# Patient Record
Sex: FEMALE | Race: WHITE | NOT HISPANIC OR LATINO | Employment: UNEMPLOYED | URBAN - NONMETROPOLITAN AREA
[De-identification: names, ages, dates, MRNs, and addresses within clinical notes are randomized per-mention and may not be internally consistent; named-entity substitution may affect disease eponyms.]

---

## 2023-04-03 ENCOUNTER — TELEPHONE (OUTPATIENT)
Dept: PRIMARY CARE | Facility: CLINIC | Age: 73
End: 2023-04-03

## 2023-04-03 DIAGNOSIS — I26.93 SINGLE SUBSEGMENTAL PULMONARY EMBOLISM WITHOUT ACUTE COR PULMONALE (MULTI): Primary | ICD-10-CM

## 2023-04-03 DIAGNOSIS — I26.93 SINGLE SUBSEGMENTAL PULMONARY EMBOLISM WITHOUT ACUTE COR PULMONALE (MULTI): ICD-10-CM

## 2023-04-03 PROBLEM — R93.1 AGATSTON CORONARY ARTERY CALCIUM SCORE GREATER THAN 400: Status: ACTIVE | Noted: 2023-04-03

## 2023-04-03 RX ORDER — ROSUVASTATIN CALCIUM 40 MG/1
40 TABLET, COATED ORAL DAILY
COMMUNITY
End: 2023-05-09 | Stop reason: SDUPTHER

## 2023-04-03 RX ORDER — METOPROLOL TARTRATE 50 MG/1
50 TABLET ORAL 2 TIMES DAILY
COMMUNITY
End: 2023-04-25

## 2023-04-03 NOTE — TELEPHONE ENCOUNTER
called, states they got the 30 day free card activated, please send Eliquis to [facility].

## 2023-04-25 DIAGNOSIS — R93.1 AGATSTON CORONARY ARTERY CALCIUM SCORE GREATER THAN 400: Primary | ICD-10-CM

## 2023-04-25 RX ORDER — METOPROLOL TARTRATE 50 MG/1
TABLET ORAL
Qty: 90 TABLET | Refills: 0 | Status: SHIPPED | OUTPATIENT
Start: 2023-04-25 | End: 2023-04-28

## 2023-04-27 DIAGNOSIS — R93.1 AGATSTON CORONARY ARTERY CALCIUM SCORE GREATER THAN 400: ICD-10-CM

## 2023-04-28 PROBLEM — J11.1 INFLUENZA: Status: RESOLVED | Noted: 2023-04-28 | Resolved: 2023-04-28

## 2023-04-28 PROBLEM — H81.10 BENIGN PAROXYSMAL POSITIONAL VERTIGO: Status: ACTIVE | Noted: 2023-04-28

## 2023-04-28 PROBLEM — E66.812 CLASS 2 SEVERE OBESITY WITH BODY MASS INDEX (BMI) OF 35 TO 39.9 WITH SERIOUS COMORBIDITY: Status: ACTIVE | Noted: 2023-04-28

## 2023-04-28 PROBLEM — E55.9 VITAMIN D DEFICIENCY: Status: ACTIVE | Noted: 2023-04-28

## 2023-04-28 PROBLEM — I26.99 PULMONARY EMBOLISM (MULTI): Status: RESOLVED | Noted: 2023-04-28 | Resolved: 2023-04-28

## 2023-04-28 PROBLEM — E11.9 DIABETES MELLITUS, TYPE 2 (MULTI): Status: ACTIVE | Noted: 2023-04-28

## 2023-04-28 PROBLEM — I07.9 TRICUSPID VALVE DISEASE: Status: ACTIVE | Noted: 2023-04-28

## 2023-04-28 PROBLEM — I10 HYPERTENSION: Status: ACTIVE | Noted: 2023-04-28

## 2023-04-28 PROBLEM — E66.01 CLASS 2 SEVERE OBESITY WITH BODY MASS INDEX (BMI) OF 35 TO 39.9 WITH SERIOUS COMORBIDITY (MULTI): Status: ACTIVE | Noted: 2023-04-28

## 2023-04-28 RX ORDER — METOPROLOL TARTRATE 50 MG/1
TABLET ORAL
Qty: 90 TABLET | Refills: 0 | Status: SHIPPED | OUTPATIENT
Start: 2023-04-28 | End: 2023-05-09 | Stop reason: SDUPTHER

## 2023-05-09 ENCOUNTER — OFFICE VISIT (OUTPATIENT)
Dept: PRIMARY CARE | Facility: CLINIC | Age: 73
End: 2023-05-09
Payer: COMMERCIAL

## 2023-05-09 VITALS
WEIGHT: 184 LBS | BODY MASS INDEX: 33.86 KG/M2 | HEART RATE: 50 BPM | DIASTOLIC BLOOD PRESSURE: 82 MMHG | HEIGHT: 62 IN | SYSTOLIC BLOOD PRESSURE: 126 MMHG | OXYGEN SATURATION: 97 %

## 2023-05-09 DIAGNOSIS — E11.9 TYPE 2 DIABETES MELLITUS WITHOUT COMPLICATION, WITHOUT LONG-TERM CURRENT USE OF INSULIN (MULTI): Primary | ICD-10-CM

## 2023-05-09 DIAGNOSIS — R93.1 AGATSTON CORONARY ARTERY CALCIUM SCORE GREATER THAN 400: ICD-10-CM

## 2023-05-09 DIAGNOSIS — E55.9 VITAMIN D DEFICIENCY: ICD-10-CM

## 2023-05-09 DIAGNOSIS — I10 PRIMARY HYPERTENSION: ICD-10-CM

## 2023-05-09 LAB — POC HEMOGLOBIN A1C: 6.8 % (ref 4.2–6.5)

## 2023-05-09 PROCEDURE — 99213 OFFICE O/P EST LOW 20 MIN: CPT | Performed by: FAMILY MEDICINE

## 2023-05-09 PROCEDURE — 3079F DIAST BP 80-89 MM HG: CPT | Performed by: FAMILY MEDICINE

## 2023-05-09 PROCEDURE — 1159F MED LIST DOCD IN RCRD: CPT | Performed by: FAMILY MEDICINE

## 2023-05-09 PROCEDURE — 83036 HEMOGLOBIN GLYCOSYLATED A1C: CPT | Performed by: FAMILY MEDICINE

## 2023-05-09 PROCEDURE — 3074F SYST BP LT 130 MM HG: CPT | Performed by: FAMILY MEDICINE

## 2023-05-09 PROCEDURE — 1160F RVW MEDS BY RX/DR IN RCRD: CPT | Performed by: FAMILY MEDICINE

## 2023-05-09 PROCEDURE — 3044F HG A1C LEVEL LT 7.0%: CPT | Performed by: FAMILY MEDICINE

## 2023-05-09 PROCEDURE — 1036F TOBACCO NON-USER: CPT | Performed by: FAMILY MEDICINE

## 2023-05-09 RX ORDER — ACETAMINOPHEN 500 MG
50 TABLET ORAL DAILY
Qty: 90 CAPSULE | Refills: 3
Start: 2023-05-09 | End: 2024-05-08

## 2023-05-09 RX ORDER — ASPIRIN 81 MG/1
1 TABLET ORAL DAILY
COMMUNITY
Start: 2020-01-17

## 2023-05-09 RX ORDER — ROSUVASTATIN CALCIUM 40 MG/1
40 TABLET, COATED ORAL DAILY
Qty: 90 TABLET | Refills: 3 | Status: SHIPPED | OUTPATIENT
Start: 2023-05-09 | End: 2024-05-08

## 2023-05-09 RX ORDER — METOPROLOL TARTRATE 50 MG/1
50 TABLET ORAL 2 TIMES DAILY
Qty: 90 TABLET | Refills: 3 | Status: SHIPPED | OUTPATIENT
Start: 2023-05-09 | End: 2024-01-15

## 2023-05-09 RX ORDER — METFORMIN HYDROCHLORIDE 500 MG/1
1 TABLET ORAL DAILY
COMMUNITY
Start: 2023-01-11 | End: 2023-05-09 | Stop reason: WASHOUT

## 2023-05-09 RX ORDER — LISINOPRIL AND HYDROCHLOROTHIAZIDE 10; 12.5 MG/1; MG/1
1 TABLET ORAL DAILY
Qty: 90 TABLET | Refills: 3 | Status: SHIPPED | OUTPATIENT
Start: 2023-05-09 | End: 2024-05-08

## 2023-05-09 RX ORDER — LISINOPRIL AND HYDROCHLOROTHIAZIDE 10; 12.5 MG/1; MG/1
1 TABLET ORAL DAILY
COMMUNITY
Start: 2023-03-10 | End: 2023-05-09 | Stop reason: SDUPTHER

## 2023-05-09 ASSESSMENT — PATIENT HEALTH QUESTIONNAIRE - PHQ9
SUM OF ALL RESPONSES TO PHQ9 QUESTIONS 1 AND 2: 0
1. LITTLE INTEREST OR PLEASURE IN DOING THINGS: NOT AT ALL
2. FEELING DOWN, DEPRESSED OR HOPELESS: NOT AT ALL

## 2023-05-09 NOTE — PROGRESS NOTES
"Subjective   Patient ID: Cassidy Osorio is a 72 y.o. female who presents for Diabetes and Hypertension.    HPI for 6 month check up  DM 2 tricuspid Dx   Saw cardiology Feb Prashanth   Denies any significant change in level of her dyspnea on exertion  1 flight of steps will cause  No chest pain pressure palpitations  She did Have echocardiogram at cardiology visit    Review of Systems    Objective   /82   Pulse 50   Ht 1.575 m (5' 2\")   Wt 83.5 kg (184 lb)   SpO2 97%   BMI 33.65 kg/m²     Physical Exam  General: alert, no apparent distress, good hygiene   HEAD:  Normocephalic, atraumatic    EARS:  EAC patent, TMs normal,   EYES:  sclera white, ALFONSO, conjunctiva noninjected  NOSE: Nasal passages patent   MOUTH: Pharynx clear, tongue uvula midline, grade 2 airway  Neck:  supple, no masses, thyroid non enlarged non nodular, no cervical adenopathy,  Lungs:  no wheezing, no rales , no rhonchi, normal respiratory pattern, breath sounds not diminished  Heart:  regular rate and  rhythm, 2/6 RUSB  murmur, no ectopy, no S3 or S4, no carotid bruits  Abdomen:  soft NT,BS + ,  no organomegaly, no masses, no bruits  Extremities:  no edema, no cyanosis, no clubbing,  2+ posterior tibialis pulse  ,both feet ext rotated R>L   Psych:  speech fluent, normal affect, normal thought process  Skin:  no rashes, no concerning skin lesions, normal texture      Assessment/Plan   Problem List Items Addressed This Visit       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  Follow-up here or with cardiology if develops more significant dyspnea on exertion chest pain palpitations.  Diabetes appears to be under adequate control with just diet

## 2023-10-16 ENCOUNTER — OFFICE VISIT (OUTPATIENT)
Dept: PRIMARY CARE | Facility: CLINIC | Age: 73
End: 2023-10-16
Payer: COMMERCIAL

## 2023-10-16 VITALS
BODY MASS INDEX: 35.15 KG/M2 | OXYGEN SATURATION: 98 % | DIASTOLIC BLOOD PRESSURE: 80 MMHG | HEART RATE: 67 BPM | SYSTOLIC BLOOD PRESSURE: 132 MMHG | WEIGHT: 191 LBS | HEIGHT: 62 IN

## 2023-10-16 DIAGNOSIS — H81.10 BENIGN PAROXYSMAL POSITIONAL VERTIGO, UNSPECIFIED LATERALITY: ICD-10-CM

## 2023-10-16 DIAGNOSIS — I10 PRIMARY HYPERTENSION: Primary | ICD-10-CM

## 2023-10-16 DIAGNOSIS — E11.9 TYPE 2 DIABETES MELLITUS WITHOUT COMPLICATION, WITHOUT LONG-TERM CURRENT USE OF INSULIN (MULTI): ICD-10-CM

## 2023-10-16 DIAGNOSIS — I26.99 PULMONARY EMBOLISM WITHOUT ACUTE COR PULMONALE, UNSPECIFIED CHRONICITY, UNSPECIFIED PULMONARY EMBOLISM TYPE (MULTI): ICD-10-CM

## 2023-10-16 DIAGNOSIS — R93.1 AGATSTON CORONARY ARTERY CALCIUM SCORE GREATER THAN 400: ICD-10-CM

## 2023-10-16 PROBLEM — E66.01 CLASS 2 SEVERE OBESITY WITH BODY MASS INDEX (BMI) OF 35 TO 39.9 WITH SERIOUS COMORBIDITY (MULTI): Status: RESOLVED | Noted: 2023-04-28 | Resolved: 2023-10-16

## 2023-10-16 PROBLEM — E66.812 CLASS 2 SEVERE OBESITY WITH BODY MASS INDEX (BMI) OF 35 TO 39.9 WITH SERIOUS COMORBIDITY: Status: RESOLVED | Noted: 2023-04-28 | Resolved: 2023-10-16

## 2023-10-16 PROCEDURE — 99213 OFFICE O/P EST LOW 20 MIN: CPT | Performed by: FAMILY MEDICINE

## 2023-10-16 PROCEDURE — 3044F HG A1C LEVEL LT 7.0%: CPT | Performed by: FAMILY MEDICINE

## 2023-10-16 PROCEDURE — 1159F MED LIST DOCD IN RCRD: CPT | Performed by: FAMILY MEDICINE

## 2023-10-16 PROCEDURE — 3075F SYST BP GE 130 - 139MM HG: CPT | Performed by: FAMILY MEDICINE

## 2023-10-16 PROCEDURE — 3079F DIAST BP 80-89 MM HG: CPT | Performed by: FAMILY MEDICINE

## 2023-10-16 PROCEDURE — 1036F TOBACCO NON-USER: CPT | Performed by: FAMILY MEDICINE

## 2023-10-16 PROCEDURE — 1160F RVW MEDS BY RX/DR IN RCRD: CPT | Performed by: FAMILY MEDICINE

## 2023-10-16 ASSESSMENT — ENCOUNTER SYMPTOMS
ABDOMINAL DISTENTION: 0
ARTHRALGIAS: 1
ACTIVITY CHANGE: 1
FEVER: 0
PALPITATIONS: 0
COLOR CHANGE: 0
DIARRHEA: 0
NAUSEA: 0
SINUS PRESSURE: 0
SHORTNESS OF BREATH: 1
APPETITE CHANGE: 0
FATIGUE: 0
VOMITING: 0
COUGH: 0

## 2023-10-16 ASSESSMENT — PATIENT HEALTH QUESTIONNAIRE - PHQ9
2. FEELING DOWN, DEPRESSED OR HOPELESS: NOT AT ALL
SUM OF ALL RESPONSES TO PHQ9 QUESTIONS 1 AND 2: 0
1. LITTLE INTEREST OR PLEASURE IN DOING THINGS: NOT AT ALL

## 2023-10-16 NOTE — PROGRESS NOTES
"Subjective   Patient ID: Cassidy Osorio is a 73 y.o. female who presents for pre surgical clearance (LT knee replacement Nov 6 Dr Manuel Escalante.).    HPI   Staining pain left knee  Left total knee replacement  Does have arthritis in right knee not as bad  Able to walk far  Does admit to exertional dyspnea  Has appointment with cardiology for cardiac clearance  Hx PE 2/23 bedridden after Influenza like illness probable COVID told to take blood thinner 3 months diagnosed treated at [facility]  U/S no DVT in leg at time   She had echocardiogram and further cardiac testing while at [facility] all unremarkable per cardiac follow-up March after hospitalization  No previous surgery     Review of Systems   Constitutional:  Positive for activity change. Negative for appetite change, fatigue and fever.        Less active because of osteoarthritis left knee more so than right   HENT:  Negative for congestion, dental problem, postnasal drip and sinus pressure.    Eyes:  Negative for visual disturbance.   Respiratory:  Positive for shortness of breath. Negative for cough.    Cardiovascular:  Negative for palpitations and leg swelling.   Gastrointestinal:  Negative for abdominal distention, diarrhea, nausea and vomiting.   Musculoskeletal:  Positive for arthralgias.   Skin:  Negative for color change, pallor and rash.       Objective   /80   Pulse 67   Ht 1.575 m (5' 2\")   Wt 86.6 kg (191 lb)   SpO2 98%   BMI 34.93 kg/m²     Physical Exam  General: alert, no apparent distress, good hygiene   HEAD:  Normocephalic, atraumatic    EARS:  EAC patent, TMs normal,   EYES:  sclera white, ALFONSO, conjunctiva noninjected  NOSE: Nasal passages patent   MOUTH: Pharynx clear, tongue uvula midline, grade 4 airway, upper dentures  Neck:  supple, no masses, thyroid non enlarged non nodular, no cervical adenopathy,  Lungs:  no wheezing, no rales , no rhonchi, normal respiratory pattern, breath sounds not diminished  Heart:  " regular rate and  rhythm, 3/6 RUSB murmur, no ectopy, no S3 or S4, no carotid bruits  Abdomen:  soft NT,BS + ,  no organomegaly, no masses, no bruits  Extremities:  no edema, no cyanosis, no clubbing,  2+ posterior tibialis pulse    Psych:  speech fluent, normal affect, normal thought process  Skin:  no rashes, no concerning skin lesions, normal texture    Assessment/Plan   Problem List Items Addressed This Visit             ICD-10-CM    Agatston coronary artery calcium score greater 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   She took 3 months of apixaban.  No DVT seen in legs at time.  Patient to have cardiac clearance.   Would encourage anticoagulation apixaban or enoxaparin postop unless surgeon feels aspirin is adequate  Patient's presurgical blood work revealed a HGA1C 10.4 surgery needs to be cancelled until better control of diabetes . Appointment made to discuss.

## 2023-10-17 ENCOUNTER — TELEPHONE (OUTPATIENT)
Dept: PRIMARY CARE | Facility: CLINIC | Age: 73
End: 2023-10-17
Payer: COMMERCIAL

## 2023-10-17 NOTE — TELEPHONE ENCOUNTER
Kelly from [facility] called regarding pt total knee surgery. They faxed over info. There was a lot of sugar in her urine and her A1C is 10. I told her that I would let you know.

## 2023-10-19 ENCOUNTER — OFFICE VISIT (OUTPATIENT)
Dept: PRIMARY CARE | Facility: CLINIC | Age: 73
End: 2023-10-19
Payer: COMMERCIAL

## 2023-10-19 VITALS
SYSTOLIC BLOOD PRESSURE: 128 MMHG | OXYGEN SATURATION: 98 % | BODY MASS INDEX: 34.39 KG/M2 | DIASTOLIC BLOOD PRESSURE: 84 MMHG | HEART RATE: 59 BPM | WEIGHT: 188 LBS

## 2023-10-19 DIAGNOSIS — E11.9 TYPE 2 DIABETES MELLITUS WITHOUT COMPLICATION, WITHOUT LONG-TERM CURRENT USE OF INSULIN (MULTI): Primary | ICD-10-CM

## 2023-10-19 PROCEDURE — 3074F SYST BP LT 130 MM HG: CPT | Performed by: FAMILY MEDICINE

## 2023-10-19 PROCEDURE — 3044F HG A1C LEVEL LT 7.0%: CPT | Performed by: FAMILY MEDICINE

## 2023-10-19 PROCEDURE — 99213 OFFICE O/P EST LOW 20 MIN: CPT | Performed by: FAMILY MEDICINE

## 2023-10-19 PROCEDURE — 3079F DIAST BP 80-89 MM HG: CPT | Performed by: FAMILY MEDICINE

## 2023-10-19 PROCEDURE — 1160F RVW MEDS BY RX/DR IN RCRD: CPT | Performed by: FAMILY MEDICINE

## 2023-10-19 PROCEDURE — 1036F TOBACCO NON-USER: CPT | Performed by: FAMILY MEDICINE

## 2023-10-19 PROCEDURE — 1159F MED LIST DOCD IN RCRD: CPT | Performed by: FAMILY MEDICINE

## 2023-10-20 NOTE — PROGRESS NOTES
Subjective   Patient ID: Cassidy Osorio is a 73 y.o. female who presents for No chief complaint on file..    HPI   Patient in to discuss diabetes  Hemoglobin A1c went from 6.8 May 9 of this year up to 10.4  Patient denies any change in diet  She has 4 pounds heavier  Patient's only change she started taking Tapee Tea or herbal product that apparently contains dexamethasone along with piroxicam  Does not check blood sugars as she was diet controlled  Lab was performed presurgery clearance for total knee replacement  Surgery may have to be canceled  Review of Systems    Objective   /84   Pulse 59   Wt 85.3 kg (188 lb)   SpO2 98%   BMI 34.39 kg/m²     Physical Exam    Assessment/Plan   Problem List Items Addressed This Visit             ICD-10-CM    Diabetes mellitus, type 2 (CMS/HCC) - Primary E11.9   Discussed with patient she is to stop the tea  She had already stopped it realizing that that could be a possibility of why her blood sugar is elevated.  Samples were given of Janumet 50/1000  Patient to  glucose meter start checking blood sugars very minimum fasting morning and presupper.  She also is to take a blood sugar at at bedtime and also occasionally pre and post supper.  She will send me blood sugars 1 week.  Being that she already stopped the tea blood sugars may normalize fairly rapidly.  Might still be able to be cleared for surgery.  Discussed with patient and  the need to have as normal blood sugars as possible pre and postop

## 2023-11-08 DIAGNOSIS — E11.9 TYPE 2 DIABETES MELLITUS WITHOUT COMPLICATION, WITHOUT LONG-TERM CURRENT USE OF INSULIN (MULTI): Primary | ICD-10-CM

## 2023-11-08 RX ORDER — SITAGLIPTIN AND METFORMIN HYDROCHLORIDE 1000; 50 MG/1; MG/1
1 TABLET, FILM COATED ORAL
Qty: 112 TABLET | Refills: 0 | COMMUNITY
Start: 2023-11-08 | End: 2024-01-25 | Stop reason: WASHOUT

## 2023-11-08 NOTE — PROGRESS NOTES
Reviewed blood sugar log sent from home blood sugars still are not at goal although they have improved quite a bit the last 3 to 4 days.  Still not good enough to have replacement surgery for her knee.  Contemplating having steroid injections which will elevate blood sugars further if she does do this.  We will add in Jardiance 10 mg daily along with Janumet 50/1000 twice a day .

## 2023-12-19 DIAGNOSIS — E11.9 TYPE 2 DIABETES MELLITUS WITHOUT COMPLICATION, WITHOUT LONG-TERM CURRENT USE OF INSULIN (MULTI): Primary | ICD-10-CM

## 2023-12-19 NOTE — PROGRESS NOTES
Needs repeat hemoglobin A1c wanting to have replacement surgery must be below 8 per [facility]

## 2023-12-20 ENCOUNTER — CLINICAL SUPPORT (OUTPATIENT)
Dept: PRIMARY CARE | Facility: CLINIC | Age: 73
End: 2023-12-20
Payer: COMMERCIAL

## 2023-12-20 DIAGNOSIS — E11.9 TYPE 2 DIABETES MELLITUS WITHOUT COMPLICATION, WITHOUT LONG-TERM CURRENT USE OF INSULIN (MULTI): ICD-10-CM

## 2023-12-20 LAB — POC HEMOGLOBIN A1C: 6.8 % (ref 4.2–6.5)

## 2023-12-20 PROCEDURE — 83036 HEMOGLOBIN GLYCOSYLATED A1C: CPT | Performed by: FAMILY MEDICINE

## 2024-01-15 DIAGNOSIS — R93.1 AGATSTON CORONARY ARTERY CALCIUM SCORE GREATER THAN 400: ICD-10-CM

## 2024-01-15 DIAGNOSIS — I10 PRIMARY HYPERTENSION: ICD-10-CM

## 2024-01-15 RX ORDER — METOPROLOL TARTRATE 50 MG/1
TABLET ORAL
Qty: 90 TABLET | Refills: 0 | Status: SHIPPED | OUTPATIENT
Start: 2024-01-15 | End: 2024-01-25 | Stop reason: SDUPTHER

## 2024-01-25 ENCOUNTER — OFFICE VISIT (OUTPATIENT)
Dept: PRIMARY CARE | Facility: CLINIC | Age: 74
End: 2024-01-25
Payer: COMMERCIAL

## 2024-01-25 VITALS
OXYGEN SATURATION: 99 % | BODY MASS INDEX: 33.53 KG/M2 | WEIGHT: 182.2 LBS | SYSTOLIC BLOOD PRESSURE: 126 MMHG | HEIGHT: 62 IN | DIASTOLIC BLOOD PRESSURE: 78 MMHG | HEART RATE: 61 BPM

## 2024-01-25 DIAGNOSIS — M17.12 PRIMARY OSTEOARTHRITIS OF LEFT KNEE: Primary | ICD-10-CM

## 2024-01-25 DIAGNOSIS — R93.1 AGATSTON CORONARY ARTERY CALCIUM SCORE GREATER THAN 400: ICD-10-CM

## 2024-01-25 DIAGNOSIS — Z86.711 HISTORY OF PULMONARY EMBOLISM: ICD-10-CM

## 2024-01-25 DIAGNOSIS — I10 PRIMARY HYPERTENSION: ICD-10-CM

## 2024-01-25 DIAGNOSIS — E11.9 TYPE 2 DIABETES MELLITUS WITHOUT COMPLICATION, WITHOUT LONG-TERM CURRENT USE OF INSULIN (MULTI): ICD-10-CM

## 2024-01-25 PROBLEM — M79.671 RIGHT FOOT PAIN: Status: ACTIVE | Noted: 2024-01-25

## 2024-01-25 PROBLEM — M25.579 PAIN IN JOINT INVOLVING ANKLE AND FOOT: Status: ACTIVE | Noted: 2022-01-28

## 2024-01-25 PROBLEM — I26.99 PE (PULMONARY THROMBOEMBOLISM) (MULTI): Status: RESOLVED | Noted: 2023-02-13 | Resolved: 2024-01-25

## 2024-01-25 PROBLEM — M19.079 ARTHRITIS OF MIDFOOT: Status: ACTIVE | Noted: 2022-01-28

## 2024-01-25 PROBLEM — R06.00 DYSPNEA: Status: ACTIVE | Noted: 2024-01-25

## 2024-01-25 PROBLEM — E78.5 HLD (HYPERLIPIDEMIA): Status: ACTIVE | Noted: 2023-02-14

## 2024-01-25 PROCEDURE — 99214 OFFICE O/P EST MOD 30 MIN: CPT | Performed by: FAMILY MEDICINE

## 2024-01-25 PROCEDURE — 3078F DIAST BP <80 MM HG: CPT | Performed by: FAMILY MEDICINE

## 2024-01-25 PROCEDURE — 1036F TOBACCO NON-USER: CPT | Performed by: FAMILY MEDICINE

## 2024-01-25 PROCEDURE — 1159F MED LIST DOCD IN RCRD: CPT | Performed by: FAMILY MEDICINE

## 2024-01-25 PROCEDURE — 3074F SYST BP LT 130 MM HG: CPT | Performed by: FAMILY MEDICINE

## 2024-01-25 RX ORDER — METOPROLOL TARTRATE 50 MG/1
TABLET ORAL
Qty: 180 TABLET | Refills: 1 | Status: SHIPPED | OUTPATIENT
Start: 2024-01-25

## 2024-01-25 ASSESSMENT — ENCOUNTER SYMPTOMS
LIGHT-HEADEDNESS: 0
CHEST TIGHTNESS: 0
DYSURIA: 0
SHORTNESS OF BREATH: 0
AGITATION: 0
FEVER: 0
NERVOUS/ANXIOUS: 0
DIZZINESS: 0
CHILLS: 0

## 2024-01-25 NOTE — PROGRESS NOTES
Subjective   Patient ID: Cassidy Osorio is a 73 y.o. female who presents for Pre-op Exam (L total knee ).    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  6 kids  34 grand kids  2 great grand kids       Tobacco Use         Smoking status:  NEVER        Smokeless tobacco:   NEVER        Vaping Use: NEVER     Alcohol use:  NONE   Drug use:  NONE       FACUNDO (obstructive sleep apnea)  Assessment:   Known FACUNDO -  none     Sleep Apnea Probability  Snores loudly:  sometimes  - mild   Tired, fatigued or sleepy in daytime:   YES   Stops breathing or choking/gasping during sleep:   NO  High blood pressure:   on medication       CV health:   Can do her housework without significant or shortness of breath -   Pt did have a stress test  2/23/23 - WNL  (Included)  -  WNL cardiac function   She feels her shortness of breath is stable since then     Also had cardiology appt 10/23/23 who cleared her for surgery       ANESTHESIA FINDINGS:  HAS NEVER HAD SURGERY   Intubation History:   Significant Anesthesia Considerations:  NO FAMILY HX OF ANESTHESIA ISSUES     Airway Exam:  General:  NARROW   Dentition:  UPPER AND LOWER DENTURES   Airway History:   NO KNOWN ISSUES       Chronic issues:     Hx of PE   2/2023  (was at [facility])  -  had been very sick with the flu -  was on Eliquis for 3 mos       Diabetes T 2:   Diagnosed:    1/2023     Today's HBGA1C:   Last A1C:    DEC   20  2023  6.8%     Todays Wt/BMI:  182 lbs   Last Wt/ BMI:     Known -    ASCVD?  Yes -  2020 Cor CT score of 500   HF?  NONE    CRD?   No     Microalb:     Date of last test:                 pos/neg     Current Medications for diabetes:        Jardiance 10 mg a day      ACE:   Yes - on Lisinopril-hctz  Statin:   on rosuvastatin   Antiplatelet agent:    on ASA 81 mg a day     Frequency of Blood Sugar Checks       Checks occas     -stays below 200        AM :        Post prandial:    Nutrition: tries to watch it   Exercise:  limited due to knee     Last Eye Exam  -   2 years ago   Any Symptoms -     Last Foot Exam:    Any symptoms - Rt foot numbness - chronic     Last Dentist Appt:   dentures     Vaccines:    declines       Also on Metoprolol 50 mg BID for hx of palpitations       Patient Active Problem List   Diagnosis    [facility] coronary artery calcium score greater than 400    Diabetes mellitus, type 2 (CMS/HCC)    Benign paroxysmal positional vertigo    Hypertension    Tricuspid valve disease    Vitamin D deficiency    Arthritis of midfoot    Dyspnea    HLD (hyperlipidemia)    Pain in joint involving ankle and foot    Right foot pain    History of pulmonary embolism       No past surgical history on file.     No Known Allergies       Current Outpatient Medications:     aspirin 81 mg EC tablet, Take 1 tablet (81 mg) by mouth once daily., Disp: , Rfl:     cholecalciferol (Vitamin D3) 50 mcg (2,000 unit) capsule, Take 1 capsule (50 mcg) by mouth once daily., Disp: 90 capsule, Rfl: 3    lisinopriL-hydrochlorothiazide 10-12.5 mg tablet, Take 1 tablet by mouth once daily., Disp: 90 tablet, Rfl: 3    rosuvastatin (Crestor) 40 mg tablet, Take 1 tablet (40 mg) by mouth once daily., Disp: 90 tablet, Rfl: 3    empagliflozin (Jardiance) 10 mg, Take 1 tablet (10 mg) by mouth once daily for 28 days., Disp: 28 tablet, Rfl: 0    metoprolol tartrate (Lopressor) 50 mg tablet, TAKE ONE TABLET BY MOUTH TWO TIMES A DAY., Disp: 180 tablet, Rfl: 1      Review of Systems   Constitutional:  Negative for chills and fever.   HENT:  Negative for congestion.   "  Respiratory:  Negative for chest tightness and shortness of breath.    Cardiovascular:  Negative for chest pain.   Endocrine: Negative for cold intolerance and heat intolerance.   Genitourinary:  Negative for dysuria.   Neurological:  Negative for dizziness and light-headedness.   Psychiatric/Behavioral:  Negative for agitation. The patient is not nervous/anxious.        Objective   /78   Pulse 61   Ht 1.575 m (5' 2\")   Wt 82.6 kg (182 lb 3.2 oz)   SpO2 99%   BMI 33.32 kg/m²     Physical Exam  Vitals reviewed.   Constitutional:       General: She is not in acute distress.     Appearance: Normal appearance. She is not ill-appearing, toxic-appearing or diaphoretic.   HENT:      Head: Normocephalic and atraumatic.      Right Ear: Tympanic membrane normal.      Left Ear: Tympanic membrane normal.      Nose: Nose normal.      Mouth/Throat:      Mouth: Mucous membranes are moist.      Pharynx: No posterior oropharyngeal erythema.      Comments: Upper and lower dentures  Eyes:      Extraocular Movements: Extraocular movements intact.      Conjunctiva/sclera: Conjunctivae normal.      Pupils: Pupils are equal, round, and reactive to light.   Cardiovascular:      Rate and Rhythm: Normal rate and regular rhythm.      Heart sounds: Normal heart sounds. No murmur heard.  Pulmonary:      Effort: Pulmonary effort is normal. No respiratory distress.      Breath sounds: Normal breath sounds. No wheezing.   Abdominal:      General: Bowel sounds are normal.      Palpations: Abdomen is soft.      Tenderness: There is no abdominal tenderness.   Musculoskeletal:      Cervical back: No rigidity.      Right lower leg: Edema (non pitting) present.      Left lower leg: Edema (non pitting) present.   Lymphadenopathy:      Cervical: No cervical adenopathy.   Skin:     General: Skin is warm and dry.      Findings: No rash.   Neurological:      General: No focal deficit present.      Mental Status: She is alert. Mental status is at " baseline.   Psychiatric:         Mood and Affect: Mood normal.         Thought Content: Thought content normal.     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 R93.1    Relevant Medications    metoprolol tartrate (Lopressor) 50 mg tablet    Diabetes mellitus, type 2 (CMS/HCC) E11.9    Hypertension I10    Relevant Medications    metoprolol tartrate (Lopressor) 50 mg tablet    History of pulmonary embolism Z86.711     Other Visit Diagnoses         Codes    Primary osteoarthritis of left knee    -  Primary M17.12          Pt to have Left TKR -      Biggest risk is hx of PE -   Surgeon likely has DVT/PE prevention protocol  -  will need to follow that closely     Will hold ASA 5 days prior to procedure     Cardiology has cleared her  -     Diabetes - sugars are controlled         Ok for surgery

## 2024-01-25 NOTE — PATIENT INSTRUCTIONS
"Hold aspirin 5 days prior to surgery       DVT prophylaxis is very important -  per DR Escalante protocol       I will fax over your note for surgery         Diabetic  Patient Reminders:    Please check your blood sugars  - on average - three times a week in the morning, and three times a week 2 hours after a meal.  That gives me a good idea of the range of your sugars.  If you do this regularly, it will help you learn how your body responds to your nutrition and exercise.  If you do not want to do this regularly, bringing sugars from the latest 2 weeks before your appointment will help.      PLEASE KEEP A LOGBOOK OF THESE SUGARS.    It is very important for you to have a regular exercise routine - at least 30 minutes 5 days a week.    It is important to inspect your feet regularly, as diabetics often get numbness of their feet and then cannot feel wounds.    It is generally recommended for diabetics to take one baby aspirin daily. Be sure to clarify this with me if I have not told you to do this.    It is very important for you to get a yearly flu shot and a PREVNAR 20 vaccine to help prevent pneumonia.    Please consider getting vaccinated against COVID-19 as well.        Watching your nutrition is VERY important  - always! You want to be eating a diet low in sodium (8313-0667 mg a day); low in starches and sweets; plenty of fresh fruits and veggies (the fresher the better); whole grains; lean meats and dairy that are low in saturated and trans fats.    Watch the amounts you eat as well - pay attention to serving size.    It is important for diabetics to see their eye doctor as least once year, and their dentist every 6 months.         For General Healthy Nutrition    (Remember - NOT A DIET!   Diets are only good for class reunions.)    These are my general good nutrition recommendations for most people.   I use the term \" diet \"  in these instructions to mean your overall nutrition - how you eat and drink.   If we " talked about something different during your visit with me,  other than what is written below,  follow that advice instead.       For most people,  eating healthier means getting less added sugar and less processed foods in your diet    The fresher the better.    Added sugar is now a part of the nutrition label on manufactured food, so you can keep an eye on it easier.    But basically,  foods and beverages  that contain regular sugar and corn syrup are the main sources of added sugars.  Eating as little of these foods as you can is best.   One shocking example of the epidemic of added sugar is soda.    One can of regular soda contains about as much added sugar as 3 regular size doughnuts!     The other issue with processed foods is the amount of processed grains they contain , such as white flour.    This is also something you want to try to limit in your diet.     But, grain products are very important for your nutrition.    Whole grains are better for your body.     Cutting back on white breads, traditional pasta, baked goods, white rice,  and processed cereals will be healthier for you.   The better choices include whole grain breads,  whole wheat pasta,  brown rice, quinoa, barley, steel cut  or rolled oats.   If you eat cereal for breakfast, try to look for one made with whole grains and less sugar.   There are many people who have a problem with gluten, for a large variety of reasons.    Generally,  products made with wheat flour , barley or rye are the primary source of gluten.       Cutting back on saturated fats is important.    You want the majority of the meat that you eat to be chicken, fish or turkey.   Baked or broiled is best -  fried adds too much fat.    There are healthy fats that are important - fat is important for holding down appetite, vitamin absorption and several metabolic processes in the body.  Monounsaturated fats raise HDL (good cholesterol) and lower LDL (bad cholesterol).   Olive oil,  "peanut oil, nuts, seeds, and avocados are great sources of the good fats.       Ideas are:   Trade sour cream dip for hummus (which is rich in olive oil) or guacamole; use veggies or whole-wheat chips to dip.    Nuts are an excellent source of protein and healthy fats.   Tree nuts are the best kind, such as almonds or walnuts.   Just be careful - they are high in calories, so stick to a serving size.  (Most are about 200 calories for a 1/4 cup)      Proteins are very important for your body, and they also hold down your appetite.   Try to have protein with every meal.    These generally are meats, nuts, many beans, legumes, eggs, and dairy.   You will find protein in whole grain products and some green vegetables have a little too.     When you have dairy (if you can - many people are lactose intolerant) try to make it low fat.    Ideas are 1% milk, lowfat yogurt or cheeses, low fat cottage cheese.   I don't generally recommend FAT FREE because they often contain artificial products to improve taste, and the fat helps hold down your appetite.   If you are lactose intolerant, try to see if taking Lactaid before having dairy helps.      Fresh fruits and vegetables are VERY important.  The brighter the better.   Many vegetables are considered \"Free Foods\" - meaning you can eat as much as you want, and it does not matter.  These include tomatoes, cucumbers, celery, peppers, all the various lettuces and kale - to name a few.   Potatoes, corn and peas are starchy, so do have more calories, but are still healthy - you just want to watch the amount of them you eat.       Fruits are full of wonderful nutrition.   They contain natural sugar called fructose, so eating them in moderation is best.   Diabetics may need to pay careful attention to how their body reacts to the sugar.  Some fruits might drastically increase their blood sugar.      Eating smaller meals with a couple of small snacks is better for your metabolism than " not eating for long amounts of time  (breakfast is very important).   Trying to avoid large meals is helpful too.    Eating like this helps keep your appetite down and keeps you in burning metabolism rather than storage metabolism so your body will use the calories you eat.       I do not tell people to stop eating sweets or snack foods - just limit the amounts you have.  The less the better.   Pay attention to serving sizes, and treat them as a treat.        Foods like doughnuts, pop tarts, sugar cereals, cookies  ARE NOT GOOD FOR BREAKFAST.   They are loaded with sugar and will cause you to be hungrier in the day and often not feel well.    Caffeine needs to be limited - no more than 2 servings a day.  Some people can't have any at all.    (if you have any sleep or anxiety issues - stop the caffeine)   Coffee, many teas, many sodas, energy drinks, almost any diet supplement,  and chocolate all contain caffeine.      Water is important.   For most people, 8   x  8 ounces  a day are needed.  This may vary for some health issues.    If you need to be on a low sodium diet, that means looking at labels and eating only 1000 - 2000 mg of sodium a day.    Calcium intake is important.  3 servings of a high calcium food or drink a day is recommended.   This is usually a cup of milk, a cup of yogurt, an ounce of a hard cheese or 1.5 ounces of a soft cheese are the usual servings.   There are other high calcium foods - including soy or almond milk, broccoli,  almonds, dark green leafy vegetable.   Make sure you are not getting more than 1000  - 1200 mg of total calcium a day (unless you have been told you need more by a doctor).    Vitamin D 3 is important to absorb the calcium and for your immune system.   For children, 400 IU a day is recommended.   For adults - 800 - 5000 IU a day  is recommended.  (Often the amount needed is individualized for adults - be sure to ask how much is right for you)    Physical activity is very  important for good health.    Finding activities that give you regular exercise is very important for good health.  Try to find exercise you enjoy doing on a regular basis.    30 minutes at least 5 days a week of a good cardiovascular exercise is recommended.   That means something that gets your heart rate going faster than your usual baseline and you can find yourself breathing harder than usual while you are exercising.  If you have not done any exercise in a long time,  make sure you ask if its safe for you to start,  and be sure to gradually work up to your goal.      If you need to lose weight,  following these recommendations will help you.   And if you are doing all of this and still not losing weight, then its likely just the amount of food you are eating.   Learn to cut back on portion sizes.  Using smaller plates may help.  Healthy weight loss is  only about a pound a week.   You have to remember that whatever you do to lose the weight, you must be prepared to keep it up for life for the weight to stay off.     A lot of people have a lot of luck with using something like a fit bit,  or a program where you keep track of all of your calories that you eat and what you burn off in the day.

## 2024-05-15 DIAGNOSIS — E87.6 HYPOKALEMIA: Primary | ICD-10-CM

## 2024-05-15 RX ORDER — POTASSIUM CHLORIDE 20 MEQ/1
20 TABLET, EXTENDED RELEASE ORAL DAILY
Qty: 30 TABLET | Refills: 11 | Status: SHIPPED | OUTPATIENT
Start: 2024-05-15 | End: 2025-05-15

## 2024-05-31 ENCOUNTER — OFFICE VISIT (OUTPATIENT)
Dept: PRIMARY CARE | Facility: CLINIC | Age: 74
End: 2024-05-31
Payer: COMMERCIAL

## 2024-05-31 VITALS
DIASTOLIC BLOOD PRESSURE: 74 MMHG | WEIGHT: 168 LBS | HEART RATE: 66 BPM | OXYGEN SATURATION: 98 % | BODY MASS INDEX: 30.73 KG/M2 | SYSTOLIC BLOOD PRESSURE: 116 MMHG

## 2024-05-31 DIAGNOSIS — E11.9 TYPE 2 DIABETES MELLITUS WITHOUT COMPLICATION, WITHOUT LONG-TERM CURRENT USE OF INSULIN (MULTI): ICD-10-CM

## 2024-05-31 DIAGNOSIS — E87.6 HYPOKALEMIA: ICD-10-CM

## 2024-05-31 DIAGNOSIS — I10 PRIMARY HYPERTENSION: ICD-10-CM

## 2024-05-31 DIAGNOSIS — M17.11 LOCALIZED OSTEOARTHRITIS OF RIGHT KNEE: Primary | ICD-10-CM

## 2024-05-31 PROBLEM — Z96.652 HISTORY OF LEFT KNEE REPLACEMENT: Status: ACTIVE | Noted: 2024-01-30

## 2024-05-31 LAB
ANION GAP SERPL CALC-SCNC: 14 MMOL/L (ref 10–20)
BUN SERPL-MCNC: 30 MG/DL (ref 6–23)
CALCIUM SERPL-MCNC: 9.6 MG/DL (ref 8.6–10.3)
CHLORIDE SERPL-SCNC: 104 MMOL/L (ref 98–107)
CO2 SERPL-SCNC: 25 MMOL/L (ref 21–32)
CREAT SERPL-MCNC: 1.13 MG/DL (ref 0.5–1.05)
EGFRCR SERPLBLD CKD-EPI 2021: 51 ML/MIN/1.73M*2
GLUCOSE SERPL-MCNC: 131 MG/DL (ref 74–99)
POTASSIUM SERPL-SCNC: 3.9 MMOL/L (ref 3.5–5.3)
SODIUM SERPL-SCNC: 139 MMOL/L (ref 136–145)

## 2024-05-31 PROCEDURE — 36415 COLL VENOUS BLD VENIPUNCTURE: CPT

## 2024-05-31 PROCEDURE — 99214 OFFICE O/P EST MOD 30 MIN: CPT | Performed by: FAMILY MEDICINE

## 2024-05-31 PROCEDURE — 3078F DIAST BP <80 MM HG: CPT | Performed by: FAMILY MEDICINE

## 2024-05-31 PROCEDURE — 1160F RVW MEDS BY RX/DR IN RCRD: CPT | Performed by: FAMILY MEDICINE

## 2024-05-31 PROCEDURE — 80048 BASIC METABOLIC PNL TOTAL CA: CPT

## 2024-05-31 PROCEDURE — 1036F TOBACCO NON-USER: CPT | Performed by: FAMILY MEDICINE

## 2024-05-31 PROCEDURE — 1159F MED LIST DOCD IN RCRD: CPT | Performed by: FAMILY MEDICINE

## 2024-05-31 PROCEDURE — 3074F SYST BP LT 130 MM HG: CPT | Performed by: FAMILY MEDICINE

## 2024-05-31 ASSESSMENT — ENCOUNTER SYMPTOMS
CHILLS: 0
NERVOUS/ANXIOUS: 0
ARTHRALGIAS: 1
FEVER: 0
DYSURIA: 0
DIZZINESS: 0
HEMATURIA: 0
CHEST TIGHTNESS: 0
AGITATION: 0
LIGHT-HEADEDNESS: 0
SHORTNESS OF BREATH: 0

## 2024-05-31 NOTE — PATIENT INSTRUCTIONS
Hold aspirin 5 days prior to surgery       DVT prophylaxis is very important -  per DR Escalante protocol       I will fax over your note for surgery       GOOD NUTRITION IS CRITICAL FOR HEALING       I will have your test results on your [facility] card within a week.    If I don't, please call and leave me a message that they were not there.  I may have not seen them.       To call for your test results,  the secureach phone number on the card is    [phone]  You also need your Mailbox ID number and your Pin number which are on your card.   If you need assistance using the card or if you have lost it, call the [facility] help number at   [phone]         Diabetic  Patient Reminders:    Please check your blood sugars  - on average - three times a week in the morning, and three times a week 2 hours after a meal.  That gives me a good idea of the range of your sugars.  If you do this regularly, it will help you learn how your body responds to your nutrition and exercise.  If you do not want to do this regularly, bringing sugars from the latest 2 weeks before your appointment will help.      PLEASE KEEP A LOGBOOK OF THESE SUGARS.    It is very important for you to have a regular exercise routine - at least 30 minutes 5 days a week.    It is important to inspect your feet regularly, as diabetics often get numbness of their feet and then cannot feel wounds.    It is generally recommended for diabetics to take one baby aspirin daily. Be sure to clarify this with me if I have not told you to do this.    It is very important for you to get a yearly flu shot and a PREVNAR 20 vaccine to help prevent pneumonia.    Please consider getting vaccinated against COVID-19 as well.        Watching your nutrition is VERY important  - always! You want to be eating a diet low in sodium (8704-1015 mg a day); low in starches and sweets; plenty of fresh fruits and veggies (the fresher the better); whole grains; lean meats and dairy  "that are low in saturated and trans fats.    Watch the amounts you eat as well - pay attention to serving size.    It is important for diabetics to see their eye doctor as least once year, and their dentist every 6 months.         For General Healthy Nutrition    (Remember - NOT A DIET!   Diets are only good for class reunions.)    These are my general good nutrition recommendations for most people.   I use the term \" diet \"  in these instructions to mean your overall nutrition - how you eat and drink.   If we talked about something different during your visit with me,  other than what is written below,  follow that advice instead.       For most people,  eating healthier means getting less added sugar and less processed foods in your diet    The fresher the better.    Added sugar is now a part of the nutrition label on manufactured food, so you can keep an eye on it easier.    But basically,  foods and beverages  that contain regular sugar and corn syrup are the main sources of added sugars.  Eating as little of these foods as you can is best.   One shocking example of the epidemic of added sugar is soda.    One can of regular soda contains about as much added sugar as 3 regular size doughnuts!     The other issue with processed foods is the amount of processed grains they contain , such as white flour.    This is also something you want to try to limit in your diet.     But, grain products are very important for your nutrition.    Whole grains are better for your body.     Cutting back on white breads, traditional pasta, baked goods, white rice,  and processed cereals will be healthier for you.   The better choices include whole grain breads,  whole wheat pasta,  brown rice, quinoa, barley, steel cut  or rolled oats.   If you eat cereal for breakfast, try to look for one made with whole grains and less sugar.   There are many people who have a problem with gluten, for a large variety of reasons.    Generally,  " "products made with wheat flour , barley or rye are the primary source of gluten.       Cutting back on saturated fats is important.    You want the majority of the meat that you eat to be chicken, fish or turkey.   Baked or broiled is best -  fried adds too much fat.    There are healthy fats that are important - fat is important for holding down appetite, vitamin absorption and several metabolic processes in the body.  Monounsaturated fats raise HDL (good cholesterol) and lower LDL (bad cholesterol).   Olive oil, peanut oil, nuts, seeds, and avocados are great sources of the good fats.       Ideas are:   Trade sour cream dip for hummus (which is rich in olive oil) or guacamole; use veggies or whole-wheat chips to dip.    Nuts are an excellent source of protein and healthy fats.   Tree nuts are the best kind, such as almonds or walnuts.   Just be careful - they are high in calories, so stick to a serving size.  (Most are about 200 calories for a 1/4 cup)      Proteins are very important for your body, and they also hold down your appetite.   Try to have protein with every meal.    These generally are meats, nuts, many beans, legumes, eggs, and dairy.   You will find protein in whole grain products and some green vegetables have a little too.     When you have dairy (if you can - many people are lactose intolerant) try to make it low fat.    Ideas are 1% milk, lowfat yogurt or cheeses, low fat cottage cheese.   I don't generally recommend FAT FREE because they often contain artificial products to improve taste, and the fat helps hold down your appetite.   If you are lactose intolerant, try to see if taking Lactaid before having dairy helps.      Fresh fruits and vegetables are VERY important.  The brighter the better.   Many vegetables are considered \"Free Foods\" - meaning you can eat as much as you want, and it does not matter.  These include tomatoes, cucumbers, celery, peppers, all the various lettuces and kale - " to name a few.   Potatoes, corn and peas are starchy, so do have more calories, but are still healthy - you just want to watch the amount of them you eat.       Fruits are full of wonderful nutrition.   They contain natural sugar called fructose, so eating them in moderation is best.   Diabetics may need to pay careful attention to how their body reacts to the sugar.  Some fruits might drastically increase their blood sugar.      Eating smaller meals with a couple of small snacks is better for your metabolism than not eating for long amounts of time  (breakfast is very important).   Trying to avoid large meals is helpful too.    Eating like this helps keep your appetite down and keeps you in burning metabolism rather than storage metabolism so your body will use the calories you eat.       I do not tell people to stop eating sweets or snack foods - just limit the amounts you have.  The less the better.   Pay attention to serving sizes, and treat them as a treat.        Foods like doughnuts, pop tarts, sugar cereals, cookies  ARE NOT GOOD FOR BREAKFAST.   They are loaded with sugar and will cause you to be hungrier in the day and often not feel well.    Caffeine needs to be limited - no more than 2 servings a day.  Some people can't have any at all.    (if you have any sleep or anxiety issues - stop the caffeine)   Coffee, many teas, many sodas, energy drinks, almost any diet supplement,  and chocolate all contain caffeine.      Water is important.   For most people, 8   x  8 ounces  a day are needed.  This may vary for some health issues.    If you need to be on a low sodium diet, that means looking at labels and eating only 1000 - 2000 mg of sodium a day.    Calcium intake is important.  3 servings of a high calcium food or drink a day is recommended.   This is usually a cup of milk, a cup of yogurt, an ounce of a hard cheese or 1.5 ounces of a soft cheese are the usual servings.   There are other high calcium  foods - including soy or almond milk, broccoli,  almonds, dark green leafy vegetable.   Make sure you are not getting more than 1000  - 1200 mg of total calcium a day (unless you have been told you need more by a doctor).    Vitamin D 3 is important to absorb the calcium and for your immune system.   For children, 400 IU a day is recommended.   For adults - 800 - 5000 IU a day  is recommended.  (Often the amount needed is individualized for adults - be sure to ask how much is right for you)    Physical activity is very important for good health.    Finding activities that give you regular exercise is very important for good health.  Try to find exercise you enjoy doing on a regular basis.    30 minutes at least 5 days a week of a good cardiovascular exercise is recommended.   That means something that gets your heart rate going faster than your usual baseline and you can find yourself breathing harder than usual while you are exercising.  If you have not done any exercise in a long time,  make sure you ask if its safe for you to start,  and be sure to gradually work up to your goal.      If you need to lose weight,  following these recommendations will help you.   And if you are doing all of this and still not losing weight, then its likely just the amount of food you are eating.   Learn to cut back on portion sizes.  Using smaller plates may help.  Healthy weight loss is  only about a pound a week.   You have to remember that whatever you do to lose the weight, you must be prepared to keep it up for life for the weight to stay off.     A lot of people have a lot of luck with using something like a fit bit,  or a program where you keep track of all of your calories that you eat and what you burn off in the day.

## 2024-05-31 NOTE — PROGRESS NOTES
Subjective   Patient ID: Cassidy Osorio is a 73 y.o. female who presents for Pre-op Exam (HAVING RIGHT TOTAL KNEE REPLACEMENT WITH DR. ESCALANTE ON 6/11/24.).    HPI     Planned date of Surgery:    6/11/24   Surgery Planned:    right total knee replacement     Surgeon:   DR Silvio Escalante     Primary Diagnosis/Reason for surgery :     Severe arthritis of the knee     HPI:     Pain in the right  knee for some time -   Worse the  past year   She and DR Escalante have elected replacement       Left knee replacement was done  1/30/24  -   Pt did very well - no issues       Had PAT testing 5/13 -   Potassium was only 3.3   I started her on KCL 20 a day   Will recheck today     Only other issues on labs - trace leuk only -   No URI sx at all       COVID VACCINATION STATUS:   Did not get vaccinated   Not sure if she had it   Flu shot - did not get done       Social History:     Islam ,  48 years  6 kids  34 grand kids  2 great grand kids       Tobacco Use         Smoking status:  NEVER        Smokeless tobacco:   NEVER        Vaping Use: NEVER     Alcohol use:  NONE   Drug use:  NONE       FACUNDO (obstructive sleep apnea)  Assessment:   Known FACUNDO -  none     Sleep Apnea Probability  Snores loudly:  sometimes  - mild   Tired, fatigued or sleepy in daytime:   YES   Stops breathing or choking/gasping during sleep:   NO  High blood pressure:   on medication       CV health:   Can do her housework without significant or shortness of breath -   Pt did have a stress test  2/23/23 - WNL   -  WNL cardiac function   She feels her shortness of breath is stable since then     Also had cardiology appt 10/23/23 who cleared her for surgery       ANESTHESIA FINDINGS:  No issues with last surgery   Intubation History:   Significant Anesthesia Considerations:  NO FAMILY HX OF ANESTHESIA ISSUES     Airway Exam:  General:  NARROW   Dentition:  UPPER AND LOWER DENTURES   Airway History:   NO KNOWN ISSUES       Chronic issues:     Hx of PE   2/2023  (was  at [facility])  -  had been very sick with the flu -  was on Eliquis for 3 mos and then it was stopped       Diabetes T 2:   Diagnosed:    1/2023      HBGA1C  May   13  -  6.7%       Todays Wt/BMI:     168 lbs   Last Wt/ BMI:    182     Known -    ASCVD?  Yes -  2020 Cor CT score of 500   HF?  NONE   CRD?   No     Microalb:     Date of last test:                 pos/neg     Current Medications for diabetes:        Jardiance 10 mg a day      ACE:   Yes - on Lisinopril-hctz  Statin:   on rosuvastatin   Antiplatelet agent:    on ASA 81 mg a day     Frequency of Blood Sugar Checks       Checks occas     -stays below 200        AM :        Post prandial:    Nutrition: tries to watch it   Exercise:  limited due to knee     Last Eye Exam  -   2 years ago   Any Symptoms -     Last Foot Exam:    Any symptoms - Rt foot numbness - chronic     Last Dentist Appt:   dentures     Vaccines:    declines       Also on Metoprolol 50 mg BID for hx of palpitations       Patient Active Problem List   Diagnosis    [facility] coronary artery calcium score greater than 400    Diabetes mellitus, type 2 (Multi)    Benign paroxysmal positional vertigo    Hypertension    Tricuspid valve disease    Vitamin D deficiency    Arthritis of midfoot    Dyspnea    HLD (hyperlipidemia)    Pain in joint involving ankle and foot    Right foot pain    History of pulmonary embolism    History of left knee replacement       Past Surgical History:   Procedure Laterality Date    TOTAL KNEE ARTHROPLASTY  01/30/2024        No Known Allergies       Current Outpatient Medications:     metoprolol tartrate (Lopressor) 50 mg tablet, TAKE ONE TABLET BY MOUTH TWO TIMES A DAY., Disp: 180 tablet, Rfl: 1    potassium chloride CR (Klor-Con M20) 20 mEq ER tablet, Take 1 tablet (20 mEq) by mouth once daily. With a meal.   Do not crush or chew., Disp: 30 tablet, Rfl: 11    aspirin 81 mg EC tablet, Take 1 tablet (81 mg) by mouth once daily., Disp: , Rfl:     empagliflozin  (Jardiance) 10 mg, Take 1 tablet (10 mg) by mouth once daily for 28 days., Disp: 28 tablet, Rfl: 0    lisinopriL-hydrochlorothiazide 10-12.5 mg tablet, Take 1 tablet by mouth once daily., Disp: 90 tablet, Rfl: 3    rosuvastatin (Crestor) 40 mg tablet, Take 1 tablet (40 mg) by mouth once daily., Disp: 90 tablet, Rfl: 3      Review of Systems   Constitutional:  Negative for chills and fever.   HENT:  Negative for congestion.    Respiratory:  Negative for chest tightness and shortness of breath.    Cardiovascular:  Negative for chest pain.   Endocrine: Negative for cold intolerance and heat intolerance.   Genitourinary:  Negative for dysuria and hematuria.   Musculoskeletal:  Positive for arthralgias.   Neurological:  Negative for dizziness and light-headedness.   Psychiatric/Behavioral:  Negative for agitation. The patient is not nervous/anxious.        Objective   /74 (BP Location: Right arm, Patient Position: Sitting, BP Cuff Size: Large adult)   Pulse 66   Wt 76.2 kg (168 lb)   SpO2 98%   BMI 30.73 kg/m²     Physical Exam  Vitals reviewed.   Constitutional:       General: She is not in acute distress.     Appearance: Normal appearance. She is not ill-appearing, toxic-appearing or diaphoretic.   HENT:      Head: Normocephalic and atraumatic.      Right Ear: Tympanic membrane normal.      Left Ear: Tympanic membrane normal.      Nose: Nose normal.      Mouth/Throat:      Mouth: Mucous membranes are moist.      Pharynx: No posterior oropharyngeal erythema.      Comments: Upper and lower dentures  Eyes:      Extraocular Movements: Extraocular movements intact.      Conjunctiva/sclera: Conjunctivae normal.      Pupils: Pupils are equal, round, and reactive to light.   Cardiovascular:      Rate and Rhythm: Normal rate and regular rhythm.      Heart sounds: Normal heart sounds. No murmur heard.  Pulmonary:      Effort: Pulmonary effort is normal. No respiratory distress.      Breath sounds: Normal breath sounds.  No wheezing.   Abdominal:      General: Bowel sounds are normal.      Palpations: Abdomen is soft.      Tenderness: There is no abdominal tenderness.   Musculoskeletal:      Cervical back: No rigidity.      Right lower leg: No edema (non pitting).      Left lower leg: No edema (non pitting).   Lymphadenopathy:      Cervical: No cervical adenopathy.   Skin:     General: Skin is warm and dry.      Findings: No rash.   Neurological:      General: No focal deficit present.      Mental Status: She is alert. Mental status is at baseline.   Psychiatric:         Mood and Affect: Mood normal.         Thought Content: Thought content normal.     I did get PAT testing results -   CBC WNL  CMP above   Urine with Trace LE  EKG - NSR non acute     Assessment/Plan   Problem List Items Addressed This Visit             ICD-10-CM       Medium    Diabetes mellitus, type 2 (Multi) E11.9    Hypertension I10     Other Visit Diagnoses         Codes    Localized osteoarthritis of right knee    -  Primary M17.11    Hypokalemia     E87.6    Relevant Orders    Basic Metabolic Panel          Pt to have  RIGHT  TKR -      Rechecking potassium     Biggest risk is hx of PE -   Surgeon likely has DVT/PE prevention protocol  -  will need to follow that closely     Will hold ASA 5 days prior to procedure     Cardiology has cleared her  -     Diabetes - sugars are controlled         Ok for surgery

## 2024-06-24 ENCOUNTER — TELEPHONE (OUTPATIENT)
Dept: PRIMARY CARE | Facility: CLINIC | Age: 74
End: 2024-06-24
Payer: COMMERCIAL

## 2024-06-24 NOTE — TELEPHONE ENCOUNTER
When mom saw you last she said she did not have an appetite and asked for something to help with that.  She STILL does not have an appetite.  She just had knee surgery and I think that if she was able to eat a little more that she would feel better.  Please give me a call.

## 2024-06-24 NOTE — TELEPHONE ENCOUNTER
I called and spoke to  Zeferino     She isn't eating well -   Pain has been severe -     But does seem to be starting to improve     I told him there is not a great med    But start Ensure or boost -   He will tell Farida I called

## 2024-07-17 ENCOUNTER — APPOINTMENT (OUTPATIENT)
Dept: PRIMARY CARE | Facility: CLINIC | Age: 74
End: 2024-07-17
Payer: COMMERCIAL

## 2024-09-12 ENCOUNTER — HOSPITAL ENCOUNTER (OUTPATIENT)
Dept: RADIOLOGY | Facility: CLINIC | Age: 74
Discharge: HOME | End: 2024-09-12
Payer: COMMERCIAL

## 2024-09-12 DIAGNOSIS — M25.50 PAIN IN UNSPECIFIED JOINT: ICD-10-CM

## 2024-09-12 DIAGNOSIS — M79.671 PAIN IN RIGHT FOOT: ICD-10-CM

## 2024-09-12 DIAGNOSIS — M79.672 PAIN IN LEFT FOOT: ICD-10-CM

## 2024-09-12 PROCEDURE — 73630 X-RAY EXAM OF FOOT: CPT | Mod: RT

## 2024-09-12 PROCEDURE — 73630 X-RAY EXAM OF FOOT: CPT | Mod: RIGHT SIDE | Performed by: RADIOLOGY

## 2024-09-12 PROCEDURE — 73630 X-RAY EXAM OF FOOT: CPT | Mod: LEFT SIDE | Performed by: RADIOLOGY

## 2024-09-12 PROCEDURE — 73630 X-RAY EXAM OF FOOT: CPT | Mod: LT

## 2024-10-17 ENCOUNTER — APPOINTMENT (OUTPATIENT)
Dept: PRIMARY CARE | Facility: CLINIC | Age: 74
End: 2024-10-17
Payer: COMMERCIAL

## 2024-12-09 DIAGNOSIS — R93.1 AGATSTON CORONARY ARTERY CALCIUM SCORE GREATER THAN 400: ICD-10-CM

## 2024-12-09 RX ORDER — ROSUVASTATIN CALCIUM 40 MG/1
40 TABLET, COATED ORAL DAILY
Qty: 90 TABLET | Refills: 0 | Status: SHIPPED | OUTPATIENT
Start: 2024-12-09

## 2024-12-17 ENCOUNTER — APPOINTMENT (OUTPATIENT)
Dept: PRIMARY CARE | Facility: CLINIC | Age: 74
End: 2024-12-17
Payer: COMMERCIAL

## 2024-12-17 VITALS
WEIGHT: 165 LBS | DIASTOLIC BLOOD PRESSURE: 74 MMHG | BODY MASS INDEX: 30.18 KG/M2 | SYSTOLIC BLOOD PRESSURE: 118 MMHG | HEART RATE: 60 BPM | OXYGEN SATURATION: 98 %

## 2024-12-17 DIAGNOSIS — I10 PRIMARY HYPERTENSION: ICD-10-CM

## 2024-12-17 DIAGNOSIS — M19.071 ARTHRITIS OF RIGHT MIDFOOT: ICD-10-CM

## 2024-12-17 DIAGNOSIS — E11.9 TYPE 2 DIABETES MELLITUS WITHOUT COMPLICATION, WITHOUT LONG-TERM CURRENT USE OF INSULIN (MULTI): Primary | ICD-10-CM

## 2024-12-17 LAB
ALBUMIN SERPL BCP-MCNC: 4.2 G/DL (ref 3.4–5)
ALP SERPL-CCNC: 61 U/L (ref 33–136)
ALT SERPL W P-5'-P-CCNC: 13 U/L (ref 7–45)
ANION GAP SERPL CALC-SCNC: 14 MMOL/L (ref 10–20)
AST SERPL W P-5'-P-CCNC: 17 U/L (ref 9–39)
BASOPHILS # BLD AUTO: 0.06 X10*3/UL (ref 0–0.1)
BASOPHILS NFR BLD AUTO: 0.9 %
BILIRUB SERPL-MCNC: 0.6 MG/DL (ref 0–1.2)
BUN SERPL-MCNC: 32 MG/DL (ref 6–23)
CALCIUM SERPL-MCNC: 9.3 MG/DL (ref 8.6–10.3)
CHLORIDE SERPL-SCNC: 103 MMOL/L (ref 98–107)
CHOLEST SERPL-MCNC: 186 MG/DL (ref 0–199)
CHOLESTEROL/HDL RATIO: 3.5
CO2 SERPL-SCNC: 26 MMOL/L (ref 21–32)
CREAT SERPL-MCNC: 0.87 MG/DL (ref 0.5–1.05)
EGFRCR SERPLBLD CKD-EPI 2021: 70 ML/MIN/1.73M*2
EOSINOPHIL # BLD AUTO: 0.07 X10*3/UL (ref 0–0.4)
EOSINOPHIL NFR BLD AUTO: 1 %
ERYTHROCYTE [DISTWIDTH] IN BLOOD BY AUTOMATED COUNT: 12.8 % (ref 11.5–14.5)
GLUCOSE SERPL-MCNC: 125 MG/DL (ref 74–99)
HCT VFR BLD AUTO: 43.2 % (ref 36–46)
HDLC SERPL-MCNC: 53.3 MG/DL
HGB BLD-MCNC: 14.1 G/DL (ref 12–16)
IMM GRANULOCYTES # BLD AUTO: 0.02 X10*3/UL (ref 0–0.5)
IMM GRANULOCYTES NFR BLD AUTO: 0.3 % (ref 0–0.9)
LDLC SERPL CALC-MCNC: 114 MG/DL
LYMPHOCYTES # BLD AUTO: 1.87 X10*3/UL (ref 0.8–3)
LYMPHOCYTES NFR BLD AUTO: 27.7 %
MCH RBC QN AUTO: 30.2 PG (ref 26–34)
MCHC RBC AUTO-ENTMCNC: 32.6 G/DL (ref 32–36)
MCV RBC AUTO: 93 FL (ref 80–100)
MONOCYTES # BLD AUTO: 0.54 X10*3/UL (ref 0.05–0.8)
MONOCYTES NFR BLD AUTO: 8 %
NEUTROPHILS # BLD AUTO: 4.2 X10*3/UL (ref 1.6–5.5)
NEUTROPHILS NFR BLD AUTO: 62.1 %
NON HDL CHOLESTEROL: 133 MG/DL (ref 0–149)
NRBC BLD-RTO: 0 /100 WBCS (ref 0–0)
PLATELET # BLD AUTO: 212 X10*3/UL (ref 150–450)
POTASSIUM SERPL-SCNC: 4.3 MMOL/L (ref 3.5–5.3)
PROT SERPL-MCNC: 6.8 G/DL (ref 6.4–8.2)
RBC # BLD AUTO: 4.67 X10*6/UL (ref 4–5.2)
SODIUM SERPL-SCNC: 139 MMOL/L (ref 136–145)
TRIGL SERPL-MCNC: 94 MG/DL (ref 0–149)
TSH SERPL-ACNC: 1.37 MIU/L (ref 0.44–3.98)
VLDL: 19 MG/DL (ref 0–40)
WBC # BLD AUTO: 6.8 X10*3/UL (ref 4.4–11.3)

## 2024-12-17 PROCEDURE — 80053 COMPREHEN METABOLIC PANEL: CPT

## 2024-12-17 PROCEDURE — 80061 LIPID PANEL: CPT

## 2024-12-17 PROCEDURE — 3062F POS MACROALBUMINURIA REV: CPT | Performed by: FAMILY MEDICINE

## 2024-12-17 PROCEDURE — 85025 COMPLETE CBC W/AUTO DIFF WBC: CPT

## 2024-12-17 PROCEDURE — 99214 OFFICE O/P EST MOD 30 MIN: CPT | Performed by: FAMILY MEDICINE

## 2024-12-17 PROCEDURE — 3049F LDL-C 100-129 MG/DL: CPT | Performed by: FAMILY MEDICINE

## 2024-12-17 PROCEDURE — 82043 UR ALBUMIN QUANTITATIVE: CPT

## 2024-12-17 PROCEDURE — 84443 ASSAY THYROID STIM HORMONE: CPT

## 2024-12-17 PROCEDURE — 3074F SYST BP LT 130 MM HG: CPT | Performed by: FAMILY MEDICINE

## 2024-12-17 PROCEDURE — 1159F MED LIST DOCD IN RCRD: CPT | Performed by: FAMILY MEDICINE

## 2024-12-17 PROCEDURE — 3044F HG A1C LEVEL LT 7.0%: CPT | Performed by: FAMILY MEDICINE

## 2024-12-17 PROCEDURE — 3078F DIAST BP <80 MM HG: CPT | Performed by: FAMILY MEDICINE

## 2024-12-17 PROCEDURE — 1160F RVW MEDS BY RX/DR IN RCRD: CPT | Performed by: FAMILY MEDICINE

## 2024-12-17 PROCEDURE — 83036 HEMOGLOBIN GLYCOSYLATED A1C: CPT

## 2024-12-17 PROCEDURE — 82570 ASSAY OF URINE CREATININE: CPT

## 2024-12-17 RX ORDER — METFORMIN HYDROCHLORIDE 500 MG/1
2000 TABLET, EXTENDED RELEASE ORAL
Qty: 360 TABLET | Refills: 3 | Status: SHIPPED | OUTPATIENT
Start: 2024-12-17 | End: 2025-12-12

## 2024-12-17 RX ORDER — SITAGLIPTIN AND METFORMIN HYDROCHLORIDE 1000; 50 MG/1; MG/1
1 TABLET, FILM COATED ORAL DAILY
COMMUNITY
End: 2024-12-17 | Stop reason: ALTCHOICE

## 2024-12-17 RX ORDER — PREDNISONE 10 MG/1
10 TABLET ORAL DAILY
COMMUNITY

## 2024-12-17 NOTE — PATIENT INSTRUCTIONS
Try taking 1/4 of a prednisone a day only -  and see how you feel -   After a week if you are ok - you can stop it.     I will have your test results on your [employer] card within a week.    If I don't, please call and leave me a message that they were not there.  I may have not seen them.       To call for your test results,  the [employer] phone number on the card is    [phone]  You also need your Mailbox ID number and your Pin number which are on your card.   If you need assistance using the card or if you have lost it, call the [employer] help number at   [phone]       Instead of Janumet - lets have you try Plain Metformin ER  500 mg -   Start at 2 with dinner -  - take that for a few nights - if you feel fine - then try 3 with dinner  - and then if fine - 4 with dinner.          Diabetic  Patient Reminders:    Please check your blood sugars  - on average - three times a week in the morning, and three times a week 2 hours after a meal.  That gives me a good idea of the range of your sugars.  If you do this regularly, it will help you learn how your body responds to your nutrition and exercise.  If you do not want to do this regularly, bringing sugars from the latest 2 weeks before your appointment will help.      PLEASE KEEP A LOGBOOK OF THESE SUGARS.    It is very important for you to have a regular exercise routine - at least 30 minutes 5 days a week.    It is important to inspect your feet regularly, as diabetics often get numbness of their feet and then cannot feel wounds.    It is generally recommended for diabetics to take one baby aspirin daily. Be sure to clarify this with me if I have not told you to do this.    It is very important for you to get a yearly flu shot and a PREVNAR 20 vaccine to help prevent pneumonia.    Please consider getting vaccinated against COVID-19 as well.        Watching your nutrition is VERY important  - always! You want to be eating a diet low in sodium (1488-4268 mg  "a day); low in starches and sweets; plenty of fresh fruits and veggies (the fresher the better); whole grains; lean meats and dairy that are low in saturated and trans fats.    Watch the amounts you eat as well - pay attention to serving size.    It is important for diabetics to see their eye doctor as least once year, and their dentist every 6 months.         For General Healthy Nutrition    (Remember - NOT A DIET!   Diets are only good for class reunions.)    These are my general good nutrition recommendations for most people.   I use the term \" diet \"  in these instructions to mean your overall nutrition - how you eat and drink.   If we talked about something different during your visit with me,  other than what is written below,  follow that advice instead.       For most people,  eating healthier means getting less added sugar and less processed foods in your diet    The fresher the better.    Added sugar is now a part of the nutrition label on manufactured food, so you can keep an eye on it easier.    But basically,  foods and beverages  that contain regular sugar and corn syrup are the main sources of added sugars.  Eating as little of these foods as you can is best.   One shocking example of the epidemic of added sugar is soda.    One can of regular soda contains about as much added sugar as 3 regular size doughnuts!     The other issue with processed foods is the amount of processed grains they contain , such as white flour.    This is also something you want to try to limit in your diet.     But, grain products are very important for your nutrition.    Whole grains are better for your body.     Cutting back on white breads, traditional pasta, baked goods, white rice,  and processed cereals will be healthier for you.   The better choices include whole grain breads,  whole wheat pasta,  brown rice, quinoa, barley, steel cut  or rolled oats.   If you eat cereal for breakfast, try to look for one made with " "whole grains and less sugar.   There are many people who have a problem with gluten, for a large variety of reasons.    Generally,  products made with wheat flour , barley or rye are the primary source of gluten.       Cutting back on saturated fats is important.    You want the majority of the meat that you eat to be chicken, fish or turkey.   Baked or broiled is best -  fried adds too much fat.    There are healthy fats that are important - fat is important for holding down appetite, vitamin absorption and several metabolic processes in the body.  Monounsaturated fats raise HDL (good cholesterol) and lower LDL (bad cholesterol).   Olive oil, peanut oil, nuts, seeds, and avocados are great sources of the good fats.       Ideas are:   Trade sour cream dip for hummus (which is rich in olive oil) or guacamole; use veggies or whole-wheat chips to dip.    Nuts are an excellent source of protein and healthy fats.   Tree nuts are the best kind, such as almonds or walnuts.   Just be careful - they are high in calories, so stick to a serving size.  (Most are about 200 calories for a 1/4 cup)      Proteins are very important for your body, and they also hold down your appetite.   Try to have protein with every meal.    These generally are meats, nuts, many beans, legumes, eggs, and dairy.   You will find protein in whole grain products and some green vegetables have a little too.     When you have dairy (if you can - many people are lactose intolerant) try to make it low fat.    Ideas are 1% milk, lowfat yogurt or cheeses, low fat cottage cheese.   I don't generally recommend FAT FREE because they often contain artificial products to improve taste, and the fat helps hold down your appetite.   If you are lactose intolerant, try to see if taking Lactaid before having dairy helps.      Fresh fruits and vegetables are VERY important.  The brighter the better.   Many vegetables are considered \"Free Foods\" - meaning you can eat as " much as you want, and it does not matter.  These include tomatoes, cucumbers, celery, peppers, all the various lettuces and kale - to name a few.   Potatoes, corn and peas are starchy, so do have more calories, but are still healthy - you just want to watch the amount of them you eat.       Fruits are full of wonderful nutrition.   They contain natural sugar called fructose, so eating them in moderation is best.   Diabetics may need to pay careful attention to how their body reacts to the sugar.  Some fruits might drastically increase their blood sugar.      Eating smaller meals with a couple of small snacks is better for your metabolism than not eating for long amounts of time  (breakfast is very important).   Trying to avoid large meals is helpful too.    Eating like this helps keep your appetite down and keeps you in burning metabolism rather than storage metabolism so your body will use the calories you eat.       I do not tell people to stop eating sweets or snack foods - just limit the amounts you have.  The less the better.   Pay attention to serving sizes, and treat them as a treat.        Foods like doughnuts, pop tarts, sugar cereals, cookies  ARE NOT GOOD FOR BREAKFAST.   They are loaded with sugar and will cause you to be hungrier in the day and often not feel well.    Caffeine needs to be limited - no more than 2 servings a day.  Some people can't have any at all.    (if you have any sleep or anxiety issues - stop the caffeine)   Coffee, many teas, many sodas, energy drinks, almost any diet supplement,  and chocolate all contain caffeine.      Water is important.   For most people, 8   x  8 ounces  a day are needed.  This may vary for some health issues.    If you need to be on a low sodium diet, that means looking at labels and eating only 1000 - 2000 mg of sodium a day.    Calcium intake is important.  3 servings of a high calcium food or drink a day is recommended.   This is usually a cup of milk, a  cup of yogurt, an ounce of a hard cheese or 1.5 ounces of a soft cheese are the usual servings.   There are other high calcium foods - including soy or almond milk, broccoli,  almonds, dark green leafy vegetable.   Make sure you are not getting more than 1000  - 1200 mg of total calcium a day (unless you have been told you need more by a doctor).    Vitamin D 3 is important to absorb the calcium and for your immune system.   For children, 400 IU a day is recommended.   For adults - 800 - 5000 IU a day  is recommended.  (Often the amount needed is individualized for adults - be sure to ask how much is right for you)    Physical activity is very important for good health.    Finding activities that give you regular exercise is very important for good health.  Try to find exercise you enjoy doing on a regular basis.    30 minutes at least 5 days a week of a good cardiovascular exercise is recommended.   That means something that gets your heart rate going faster than your usual baseline and you can find yourself breathing harder than usual while you are exercising.  If you have not done any exercise in a long time,  make sure you ask if its safe for you to start,  and be sure to gradually work up to your goal.      If you need to lose weight,  following these recommendations will help you.   And if you are doing all of this and still not losing weight, then its likely just the amount of food you are eating.   Learn to cut back on portion sizes.  Using smaller plates may help.  Healthy weight loss is  only about a pound a week.   You have to remember that whatever you do to lose the weight, you must be prepared to keep it up for life for the weight to stay off.     A lot of people have a lot of luck with using something like a fit bit,  or a program where you keep track of all of your calories that you eat and what you burn off in the day.

## 2024-12-17 NOTE — PROGRESS NOTES
"Subjective   Patient ID: Cassidy Osorio is a 74 y.o. female who presents for Follow-up (Medication refills needed.).    HPI     LOV  5/2024 for pre-op right knee replacement       Updates and concerns:     She states her recovery was long and hard -   Lots of pain.     To have foot surgery with DR Galeas  -   Right foot -   1/16/24  - date of surgery   \"Right foot/ankle Subtalar Joint fusion, Gastrocnemius recession, plantarflexory navicular cuneiform fusion. \"       Feeling tired       Saw DR Vasquez for her arthritis pain -   Placed on Prednisone taper -   Taking 5 mg daily since October   It did help the pain       Chronic issues reviewed today -      Diabetes T 2:   Diagnosed:    1/2023       HBGA1C  May   13  -  6.7%   TODAY -  6.5%         Todays Wt/BMI:     165  lbs   Last Wt/ BMI:    168      Known -    ASCVD?  Yes -  2020 Cor CT score of 500        Stress test 2023 -  WNL   HF?  NONE   CRD?   No      Microalb:     Date of last test:                 pos/neg      Current Medications for diabetes:        Janumet 50/1000 one a day      ACE:   Yes - on Lisinopril-hctz  Statin:   on rosuvastatin   Antiplatelet agent:    on ASA 81 mg a day      Frequency of Blood Sugar Checks       Checks occas     -stays below 200        AM :        Post prandial:     Nutrition: tries to watch it   Exercise:  limited due to knees and foot      Last Eye Exam  -   2 years ago  - to make appt   Any Symptoms -      Last Foot Exam:  sees DR Galeas   Any symptoms - Rt foot numbness - chronic      Last Dentist Appt:   dentures      Vaccines:    declines         HTN -   Lisinopril- hydrochlorothiazide 10-12.5 mg daiy   Metoprolol 50 mg BID     Hypokalemia - KCL 20 mQEU daily     Also on Metoprolol 50 mg BID for hx of palpitations     Hx of PE   2/2023  (was at [facility])  -  had been very sick with the flu -  was on Eliquis for 3 mos and then it was stopped     Left knee replaced   1/30/24  Right knee replaced  6/11/24   NO ISSUES WITH THESE " SURGERIES    Does not get mammograms     Declines a flu shot       Review of Systems    Objective   /74 (BP Location: Right arm, Patient Position: Sitting, BP Cuff Size: Large adult)   Pulse 60   Wt 74.8 kg (165 lb)   SpO2 98%   BMI 30.18 kg/m²     Physical Exam  Vitals reviewed.   Constitutional:       General: She is not in acute distress.     Appearance: Normal appearance.   HENT:      Head: Normocephalic and atraumatic.      Nose: Nose normal.      Mouth/Throat:      Mouth: Mucous membranes are moist.      Pharynx: No posterior oropharyngeal erythema.   Eyes:      Extraocular Movements: Extraocular movements intact.      Conjunctiva/sclera: Conjunctivae normal.      Pupils: Pupils are equal, round, and reactive to light.   Cardiovascular:      Rate and Rhythm: Normal rate and regular rhythm.      Heart sounds: Normal heart sounds. No murmur heard.  Pulmonary:      Effort: Pulmonary effort is normal. No respiratory distress.      Breath sounds: Normal breath sounds. No wheezing.   Musculoskeletal:      Cervical back: No rigidity.   Lymphadenopathy:      Cervical: No cervical adenopathy.   Skin:     General: Skin is warm and dry.      Findings: No rash.   Neurological:      General: No focal deficit present.      Mental Status: She is alert. Mental status is at baseline.   Psychiatric:         Mood and Affect: Mood normal.         Thought Content: Thought content normal.       CBC AND CMP WNL TODAY     Assessment/Plan   Assessment & Plan  Type 2 diabetes mellitus without complication, without long-term current use of insulin (Multi)    Orders:    Albumin-Creatinine Ratio, Urine Random    CBC and Auto Differential    Comprehensive Metabolic Panel    Lipid Panel    Thyroid Stimulating Hormone    Hemoglobin A1C    metFORMIN XR (Glucophage-XR) 500 mg 24 hr tablet; Take 4 tablets (2,000 mg) by mouth once daily in the evening. Take with meals. Do not crush, chew, or split.    PT was on Janumet - but was  getting samples  - we do not have any - sugars well controlled - will try just metformin       Primary hypertension       stable       Arthritis of right midfoot       cleared for surgery     Discussed slowly weaning of prednisone        We discussed at visit any disease processes that were of concern as well as the risks, benefits and instructions of any new medication provided.    See orders and discussion section for information provided to patient in their After Visit Summary.   Patient (and/or caretaker of patient if present)  stated all questions were answered, and they voiced understanding of instructions.

## 2024-12-18 LAB
CREAT UR-MCNC: 94.7 MG/DL (ref 20–320)
EST. AVERAGE GLUCOSE BLD GHB EST-MCNC: 140 MG/DL
HBA1C MFR BLD: 6.5 %
MICROALBUMIN UR-MCNC: <7 MG/L
MICROALBUMIN/CREAT UR: NORMAL MG/G{CREAT}

## 2024-12-19 NOTE — ASSESSMENT & PLAN NOTE
Orders:    Albumin-Creatinine Ratio, Urine Random    CBC and Auto Differential    Comprehensive Metabolic Panel    Lipid Panel    Thyroid Stimulating Hormone    Hemoglobin A1C    metFORMIN XR (Glucophage-XR) 500 mg 24 hr tablet; Take 4 tablets (2,000 mg) by mouth once daily in the evening. Take with meals. Do not crush, chew, or split.    PT was on Janumet - but was getting samples  - we do not have any - sugars well controlled - will try just metformin

## 2025-01-16 ENCOUNTER — TELEPHONE (OUTPATIENT)
Dept: PRIMARY CARE | Facility: CLINIC | Age: 75
End: 2025-01-16
Payer: COMMERCIAL

## 2025-01-16 DIAGNOSIS — B95.0 GROUP A STREPTOCOCCAL INFECTION: Primary | ICD-10-CM

## 2025-01-16 RX ORDER — AMOXICILLIN 500 MG/1
500 CAPSULE ORAL
Qty: 14 CAPSULE | Refills: 0 | Status: SHIPPED | OUTPATIENT
Start: 2025-01-16 | End: 2025-01-23

## 2025-01-16 NOTE — TELEPHONE ENCOUNTER
Troubles urinating.  I think I have a bladder infection.  Can you send me in something? Or do I HAVE to come in?  Let me know.

## 2025-01-17 ENCOUNTER — TELEPHONE (OUTPATIENT)
Dept: PRIMARY CARE | Facility: CLINIC | Age: 75
End: 2025-01-17
Payer: COMMERCIAL

## 2025-01-17 NOTE — TELEPHONE ENCOUNTER
Looking at note from the podiatrists -     They want her to see cardiology     Called and told pt  - they will call [facility] for appt

## 2025-01-17 NOTE — TELEPHONE ENCOUNTER
She needs an order for an echo sent to [facility].  She was scheduled for surgery but it was rescheduled because she needed an updated echo.

## 2025-01-21 ENCOUNTER — OFFICE VISIT (OUTPATIENT)
Dept: PRIMARY CARE | Facility: CLINIC | Age: 75
End: 2025-01-21
Payer: COMMERCIAL

## 2025-01-21 VITALS
BODY MASS INDEX: 30.58 KG/M2 | OXYGEN SATURATION: 98 % | DIASTOLIC BLOOD PRESSURE: 82 MMHG | WEIGHT: 167.2 LBS | HEART RATE: 92 BPM | SYSTOLIC BLOOD PRESSURE: 124 MMHG

## 2025-01-21 DIAGNOSIS — R39.9 UTI SYMPTOMS: ICD-10-CM

## 2025-01-21 DIAGNOSIS — R39.11 URINARY HESITANCY: Primary | ICD-10-CM

## 2025-01-21 LAB
POC APPEARANCE, URINE: CLEAR
POC BILIRUBIN, URINE: NEGATIVE
POC BLOOD, URINE: NEGATIVE
POC COLOR, URINE: YELLOW
POC GLUCOSE, URINE: NEGATIVE MG/DL
POC KETONES, URINE: NEGATIVE MG/DL
POC LEUKOCYTES, URINE: NEGATIVE
POC NITRITE,URINE: NEGATIVE
POC PH, URINE: 6 PH
POC PROTEIN, URINE: NEGATIVE MG/DL
POC SPECIFIC GRAVITY, URINE: 1.02
POC UROBILINOGEN, URINE: 0.2 EU/DL

## 2025-01-21 PROCEDURE — 1160F RVW MEDS BY RX/DR IN RCRD: CPT | Performed by: FAMILY MEDICINE

## 2025-01-21 PROCEDURE — 99212 OFFICE O/P EST SF 10 MIN: CPT | Performed by: FAMILY MEDICINE

## 2025-01-21 PROCEDURE — 81003 URINALYSIS AUTO W/O SCOPE: CPT | Performed by: FAMILY MEDICINE

## 2025-01-21 PROCEDURE — 3079F DIAST BP 80-89 MM HG: CPT | Performed by: FAMILY MEDICINE

## 2025-01-21 PROCEDURE — 1159F MED LIST DOCD IN RCRD: CPT | Performed by: FAMILY MEDICINE

## 2025-01-21 PROCEDURE — 3074F SYST BP LT 130 MM HG: CPT | Performed by: FAMILY MEDICINE

## 2025-01-21 NOTE — PATIENT INSTRUCTIONS
You need to greatly work on increasing your fluid intake - try to get at least 5 - 6 cups of water in the day.         This should help you - if not  - I need know.

## 2025-01-21 NOTE — PROGRESS NOTES
Subjective   Patient ID: Cassidy Osorio is a 74 y.o. female who presents for urinating small amounts     HPI     Pt called the other day -   Felt she had a UTI -   I agreed to send in Amox -   It did not help at all     Urinating very small amounts only a few days now  No dysuria  No swelling  Very mild Lower abdominal pain       Liquids in the day -   2 cups of coffee a day is her only liquids she drinks         Review of Systems    Objective   /82   Pulse 92   Wt 75.8 kg (167 lb 3.2 oz)   SpO2 98%   BMI 30.58 kg/m²     Physical Exam  Vitals reviewed.   Constitutional:       Appearance: Normal appearance.   Cardiovascular:      Rate and Rhythm: Normal rate and regular rhythm.      Heart sounds: Murmur (grade 2/6) heard.   Pulmonary:      Effort: Pulmonary effort is normal.      Breath sounds: Normal breath sounds.   Abdominal:      General: There is no distension.      Palpations: Abdomen is soft. There is no mass.      Tenderness: There is no abdominal tenderness.   Neurological:      Mental Status: She is alert.       Urine clear    Assessment/Plan   Problem List Items Addressed This Visit    None  Visit Diagnoses         Codes    Urinary hesitancy    -  Primary R39.11    UTI symptoms     R39.9    Relevant Orders    POCT UA Automated manually resulted (Completed)          I discussed with pt that this may be from dehydration -   She is willing to increase her water in take and see if that helps  -   Will let me know if not.      
Improved

## 2025-01-25 DIAGNOSIS — R93.1 AGATSTON CORONARY ARTERY CALCIUM SCORE GREATER THAN 400: ICD-10-CM

## 2025-01-25 DIAGNOSIS — I10 PRIMARY HYPERTENSION: ICD-10-CM

## 2025-01-26 RX ORDER — METOPROLOL TARTRATE 50 MG/1
TABLET ORAL
Qty: 180 TABLET | Refills: 1 | Status: SHIPPED | OUTPATIENT
Start: 2025-01-26

## 2025-02-03 ENCOUNTER — PATIENT OUTREACH (OUTPATIENT)
Dept: PRIMARY CARE | Facility: CLINIC | Age: 75
End: 2025-02-03
Payer: COMMERCIAL

## 2025-02-03 RX ORDER — OXYCODONE AND ACETAMINOPHEN 5; 325 MG/1; MG/1
1 TABLET ORAL EVERY 4 HOURS PRN
COMMUNITY
Start: 2025-01-31 | End: 2027-10-27

## 2025-02-18 ENCOUNTER — TELEPHONE (OUTPATIENT)
Dept: PRIMARY CARE | Facility: CLINIC | Age: 75
End: 2025-02-18
Payer: COMMERCIAL

## 2025-02-18 DIAGNOSIS — I48.0 PAROXYSMAL ATRIAL FIBRILLATION (MULTI): Primary | ICD-10-CM

## 2025-02-18 NOTE — TELEPHONE ENCOUNTER
Call them - I do not have Eliquis on her med list -     I need to know when it was started -   How long she is to be on it.

## 2025-02-18 NOTE — TELEPHONE ENCOUNTER
will pickup samples and complete patient assistance forms.  Please add to med list and I will document samples

## 2025-02-18 NOTE — TELEPHONE ENCOUNTER
Spoke to , she was in [place] and prescribed Eliquis 5 mg BID on 1/31/25 for new AFIB ( see discharge summary in Epic)  Has follow up with Dr. Alford 2/26/25 but needs rx sent to [facility] because he can't afford to buy another rx from the local pharmacy

## 2025-02-21 ENCOUNTER — PATIENT OUTREACH (OUTPATIENT)
Dept: PRIMARY CARE | Facility: CLINIC | Age: 75
End: 2025-02-21
Payer: COMMERCIAL

## 2025-02-21 NOTE — PROGRESS NOTES
Unable to reach patient for call back after recent hospitalization. LVM with call back number for patient to call if needed.

## 2025-03-13 ENCOUNTER — PATIENT OUTREACH (OUTPATIENT)
Dept: PRIMARY CARE | Facility: CLINIC | Age: 75
End: 2025-03-13
Payer: COMMERCIAL

## 2025-03-13 DIAGNOSIS — R93.1 AGATSTON CORONARY ARTERY CALCIUM SCORE GREATER THAN 400: ICD-10-CM

## 2025-03-13 RX ORDER — ROSUVASTATIN CALCIUM 40 MG/1
40 TABLET, COATED ORAL DAILY
Qty: 90 TABLET | Refills: 3 | Status: SHIPPED | OUTPATIENT
Start: 2025-03-13

## 2025-03-13 NOTE — PROGRESS NOTES
Outreach made for monthly post discharge follow up. Spoke with patient's spouse. Per spouse, the patient is healing well from her foot surgery. She has a follow up tomorrow with podiatry. Refill request sent to Dr. Bush for Crestor. No other needs at this time.

## 2025-03-25 ENCOUNTER — HOSPITAL ENCOUNTER (OUTPATIENT)
Dept: RADIOLOGY | Facility: HOSPITAL | Age: 75
Discharge: HOME | End: 2025-03-25
Payer: COMMERCIAL

## 2025-03-25 DIAGNOSIS — K59.00 CONSTIPATION, UNSPECIFIED: ICD-10-CM

## 2025-03-25 DIAGNOSIS — N39.0 URINARY TRACT INFECTION, SITE NOT SPECIFIED: ICD-10-CM

## 2025-03-25 DIAGNOSIS — R10.84 GENERALIZED ABDOMINAL PAIN: ICD-10-CM

## 2025-03-25 LAB
CREAT SERPL-MCNC: 0.74 MG/DL (ref 0.6–1.3)
GFR SERPL CREATININE-BSD FRML MDRD: 84 ML/MIN/1.73M*2

## 2025-03-25 PROCEDURE — 74177 CT ABD & PELVIS W/CONTRAST: CPT

## 2025-03-25 PROCEDURE — 2550000001 HC RX 255 CONTRASTS

## 2025-03-25 PROCEDURE — 82565 ASSAY OF CREATININE: CPT

## 2025-03-25 RX ADMIN — IOHEXOL 75 ML: 350 INJECTION, SOLUTION INTRAVENOUS at 16:42

## 2025-03-26 ENCOUNTER — TELEPHONE (OUTPATIENT)
Dept: PRIMARY CARE | Facility: CLINIC | Age: 75
End: 2025-03-26
Payer: COMMERCIAL

## 2025-03-26 NOTE — TELEPHONE ENCOUNTER
She had a ct scan done yesterday.  Do we need to come in for a follow up on that?  Will you call with the results?  Let us know.

## 2025-04-21 ENCOUNTER — TELEPHONE (OUTPATIENT)
Dept: PRIMARY CARE | Facility: CLINIC | Age: 75
End: 2025-04-21
Payer: COMMERCIAL

## 2025-04-21 DIAGNOSIS — F41.9 ANXIETY: Primary | ICD-10-CM

## 2025-04-21 RX ORDER — HYDROXYZINE HYDROCHLORIDE 10 MG/1
10 TABLET, FILM COATED ORAL EVERY 6 HOURS PRN
Qty: 30 TABLET | Refills: 0 | Status: SHIPPED | OUTPATIENT
Start: 2025-04-21 | End: 2025-05-21

## 2025-04-21 NOTE — TELEPHONE ENCOUNTER
She was in [facility] on 4/11 and she was prescribed hydroxyzine.  She needs to have that refilled if that's possible.  Please let us know.

## 2025-04-22 ENCOUNTER — TELEPHONE (OUTPATIENT)
Dept: PRIMARY CARE | Facility: CLINIC | Age: 75
End: 2025-04-22
Payer: COMMERCIAL

## 2025-04-25 ENCOUNTER — PATIENT OUTREACH (OUTPATIENT)
Dept: PRIMARY CARE | Facility: CLINIC | Age: 75
End: 2025-04-25
Payer: COMMERCIAL

## 2025-04-25 NOTE — PROGRESS NOTES
Outreach made to wrap up Transitional Care Management (TCM) program. Spoke with spouse. Patient unavailable at time of outreach. Per spouse, the patient is having quite a bit of shoulder pain. This pain is unrelated to her hospitalization for her ankle. Patient went to the ED and then saw ortho, who put her on steroids, which is helping. Advised spouse to have patient call back or call Dr. Bush's directly office if she needed further assistance.

## 2025-04-28 ENCOUNTER — APPOINTMENT (OUTPATIENT)
Dept: PRIMARY CARE | Facility: CLINIC | Age: 75
End: 2025-04-28
Payer: COMMERCIAL

## 2025-04-28 VITALS
HEART RATE: 78 BPM | DIASTOLIC BLOOD PRESSURE: 76 MMHG | OXYGEN SATURATION: 97 % | BODY MASS INDEX: 29.26 KG/M2 | WEIGHT: 160 LBS | SYSTOLIC BLOOD PRESSURE: 118 MMHG

## 2025-04-28 DIAGNOSIS — I48.0 PAROXYSMAL ATRIAL FIBRILLATION (MULTI): ICD-10-CM

## 2025-04-28 DIAGNOSIS — I10 PRIMARY HYPERTENSION: ICD-10-CM

## 2025-04-28 DIAGNOSIS — E11.9 TYPE 2 DIABETES MELLITUS WITHOUT COMPLICATION, WITHOUT LONG-TERM CURRENT USE OF INSULIN: Primary | ICD-10-CM

## 2025-04-28 DIAGNOSIS — F33.1 MODERATE RECURRENT MAJOR DEPRESSION: ICD-10-CM

## 2025-04-28 LAB — POC HEMOGLOBIN A1C: 6.6 % (ref 4.2–6.5)

## 2025-04-28 PROCEDURE — 1159F MED LIST DOCD IN RCRD: CPT | Performed by: FAMILY MEDICINE

## 2025-04-28 PROCEDURE — 3074F SYST BP LT 130 MM HG: CPT | Performed by: FAMILY MEDICINE

## 2025-04-28 PROCEDURE — 3078F DIAST BP <80 MM HG: CPT | Performed by: FAMILY MEDICINE

## 2025-04-28 PROCEDURE — 83036 HEMOGLOBIN GLYCOSYLATED A1C: CPT | Performed by: FAMILY MEDICINE

## 2025-04-28 PROCEDURE — 99214 OFFICE O/P EST MOD 30 MIN: CPT | Performed by: FAMILY MEDICINE

## 2025-04-28 PROCEDURE — 1160F RVW MEDS BY RX/DR IN RCRD: CPT | Performed by: FAMILY MEDICINE

## 2025-04-28 PROCEDURE — 3044F HG A1C LEVEL LT 7.0%: CPT | Performed by: FAMILY MEDICINE

## 2025-04-28 RX ORDER — BUPROPION HYDROCHLORIDE 150 MG/1
150 TABLET ORAL EVERY MORNING
Qty: 30 TABLET | Refills: 5 | Status: SHIPPED | OUTPATIENT
Start: 2025-04-28 | End: 2025-10-25

## 2025-04-28 ASSESSMENT — PATIENT HEALTH QUESTIONNAIRE - PHQ9
5. POOR APPETITE OR OVEREATING: SEVERAL DAYS
2. FEELING DOWN, DEPRESSED OR HOPELESS: SEVERAL DAYS
9. THOUGHTS THAT YOU WOULD BE BETTER OFF DEAD, OR OF HURTING YOURSELF: NOT AT ALL
3. TROUBLE FALLING OR STAYING ASLEEP OR SLEEPING TOO MUCH: SEVERAL DAYS
2. FEELING DOWN, DEPRESSED OR HOPELESS: SEVERAL DAYS
SUM OF ALL RESPONSES TO PHQ9 QUESTIONS 1 AND 2: 1
1. LITTLE INTEREST OR PLEASURE IN DOING THINGS: SEVERAL DAYS
6. FEELING BAD ABOUT YOURSELF - OR THAT YOU ARE A FAILURE OR HAVE LET YOURSELF OR YOUR FAMILY DOWN: NOT AT ALL
SUM OF ALL RESPONSES TO PHQ9 QUESTIONS 1 AND 2: 2
4. FEELING TIRED OR HAVING LITTLE ENERGY: SEVERAL DAYS
SUM OF ALL RESPONSES TO PHQ QUESTIONS 1-9: 5
7. TROUBLE CONCENTRATING ON THINGS, SUCH AS READING THE NEWSPAPER OR WATCHING TELEVISION: NOT AT ALL
8. MOVING OR SPEAKING SO SLOWLY THAT OTHER PEOPLE COULD HAVE NOTICED. OR THE OPPOSITE, BEING SO FIGETY OR RESTLESS THAT YOU HAVE BEEN MOVING AROUND A LOT MORE THAN USUAL: NOT AT ALL
1. LITTLE INTEREST OR PLEASURE IN DOING THINGS: NOT AT ALL

## 2025-04-28 NOTE — PATIENT INSTRUCTIONS
"Eliquis assistance program  [phone]      Lets try you on Bupropion XL  150 mg daily      Try to keep up the daily exercises      Tylenol  1000 mg up to three times a day as needed for pain  -   That's ok with the prednisone and Eliquis.     Can also try topical  -    Lidocaine patches or Aspercreme with Lidocaine.        I will have your test results on your [employer] card within a week.    If I don't, please call and leave me a message that they were not there.  I may have not seen them.       To call for your test results,  the [employer]ach phone number on the card is    [phone]  You also need your Mail[employer] ID number and your Pin number which are on your card.   If you need assistance using the card or if you have lost it, call the [employer] help number at   [phone]       For General Healthy Nutrition    (Remember - NOT A DIET!   Diets are only good for class reunions.)    These are my general good nutrition recommendations for most people.   I use the term \" diet \"  in these instructions to mean your overall nutrition - how you eat and drink.   If we talked about something different during your visit with me,  other than what is written below,  follow that advice instead.       For most people,  eating healthier means getting less added sugar and less processed foods in your diet    The fresher the better.    Added sugar is now a part of the nutrition label on manufactured food, so you can keep an eye on it easier.    But basically,  foods and beverages  that contain regular sugar and corn syrup are the main sources of added sugars.  Eating as little of these foods as you can is best.   One shocking example of the epidemic of added sugar is soda.    One can of regular soda contains about as much added sugar as 3 regular size doughnuts!     The other issue with processed foods is the amount of processed grains they contain , such as white flour.    This is also something you want to try to limit in " your diet.     But, grain products are very important for your nutrition.    Whole grains are better for your body.     Cutting back on white breads, traditional pasta, baked goods, white rice,  and processed cereals will be healthier for you.   The better choices include whole grain breads,  whole wheat pasta,  brown rice, quinoa, barley, steel cut  or rolled oats.   If you eat cereal for breakfast, try to look for one made with whole grains and less sugar.   There are many people who have a problem with gluten, for a large variety of reasons.    Generally,  products made with wheat flour , barley or rye are the primary source of gluten.       Cutting back on saturated fats is important.    You want the majority of the meat that you eat to be chicken, fish or turkey.   Baked or broiled is best -  fried adds too much fat.    There are healthy fats that are important - fat is important for holding down appetite, vitamin absorption and several metabolic processes in the body.  Monounsaturated fats raise HDL (good cholesterol) and lower LDL (bad cholesterol).   Olive oil, peanut oil, nuts, seeds, and avocados are great sources of the good fats.       Ideas are:   Trade sour cream dip for hummus (which is rich in olive oil) or guacamole; use veggies or whole-wheat chips to dip.    Nuts are an excellent source of protein and healthy fats.   Tree nuts are the best kind, such as almonds or walnuts.   Just be careful - they are high in calories, so stick to a serving size.  (Most are about 200 calories for a 1/4 cup)      Proteins are very important for your body, and they also hold down your appetite.   Try to have protein with every meal.    These generally are meats, nuts, many beans, legumes, eggs, and dairy.   You will find protein in whole grain products and some green vegetables have a little too.     When you have dairy (if you can - many people are lactose intolerant) try to make it low fat.    Ideas are 1% milk,  "lowfat yogurt or cheeses, low fat cottage cheese.   I don't generally recommend FAT FREE because they often contain artificial products to improve taste, and the fat helps hold down your appetite.   If you are lactose intolerant, try to see if taking Lactaid before having dairy helps.      Fresh fruits and vegetables are VERY important.  The brighter the better.   Many vegetables are considered \"Free Foods\" - meaning you can eat as much as you want, and it does not matter.  These include tomatoes, cucumbers, celery, peppers, all the various lettuces and kale - to name a few.   Potatoes, corn and peas are starchy, so do have more calories, but are still healthy - you just want to watch the amount of them you eat.       Fruits are full of wonderful nutrition.   They contain natural sugar called fructose, so eating them in moderation is best.   Diabetics may need to pay careful attention to how their body reacts to the sugar.  Some fruits might drastically increase their blood sugar.      Eating smaller meals with a couple of small snacks is better for your metabolism than not eating for long amounts of time  (breakfast is very important).   Trying to avoid large meals is helpful too.    Eating like this helps keep your appetite down and keeps you in burning metabolism rather than storage metabolism so your body will use the calories you eat.       I do not tell people to stop eating sweets or snack foods - just limit the amounts you have.  The less the better.   Pay attention to serving sizes, and treat them as a treat.        Foods like doughnuts, pop tarts, sugar cereals, cookies  ARE NOT GOOD FOR BREAKFAST.   They are loaded with sugar and will cause you to be hungrier in the day and often not feel well.    Caffeine needs to be limited - no more than 2 servings a day.  Some people can't have any at all.    (if you have any sleep or anxiety issues - stop the caffeine)   Coffee, many teas, many sodas, energy drinks, " almost any diet supplement,  and chocolate all contain caffeine.      Water is important.   For most people, 8   x  8 ounces  a day are needed.  This may vary for some health issues.    If you need to be on a low sodium diet, that means looking at labels and eating only 1000 - 2000 mg of sodium a day.    Calcium intake is important.  3 servings of a high calcium food or drink a day is recommended.   This is usually a cup of milk, a cup of yogurt, an ounce of a hard cheese or 1.5 ounces of a soft cheese are the usual servings.   There are other high calcium foods - including soy or almond milk, broccoli,  almonds, dark green leafy vegetable.   Make sure you are not getting more than 1000  - 1200 mg of total calcium a day (unless you have been told you need more by a doctor).    Vitamin D 3 is important to absorb the calcium and for your immune system.   For children, 400 IU a day is recommended.   For adults - 800 - 5000 IU a day  is recommended.  (Often the amount needed is individualized for adults - be sure to ask how much is right for you)    Physical activity is very important for good health.    Finding activities that give you regular exercise is very important for good health.  Try to find exercise you enjoy doing on a regular basis.    30 minutes at least 5 days a week of a good cardiovascular exercise is recommended.   That means something that gets your heart rate going faster than your usual baseline and you can find yourself breathing harder than usual while you are exercising.  If you have not done any exercise in a long time,  make sure you ask if its safe for you to start,  and be sure to gradually work up to your goal.      If you need to lose weight,  following these recommendations will help you.   And if you are doing all of this and still not losing weight, then its likely just the amount of food you are eating.   Learn to cut back on portion sizes.  Using smaller plates may help.  Healthy weight  loss is  only about a pound a week.   You have to remember that whatever you do to lose the weight, you must be prepared to keep it up for life for the weight to stay off.     A lot of people have a lot of luck with using something like a fit bit,  or a program where you keep track of all of your calories that you eat and what you burn off in the day.

## 2025-04-28 NOTE — PROGRESS NOTES
"Subjective   Patient ID: Cassidy Osorio is a 74 y.o. female who presents for Follow-up.    [facility]     LOV   10/2024     Updates and concerns:     Had her foot surgery -     Issues with afib started after   foot surgery with DR Galeas  -   Right foot -   1/16/24  - date of surgery   \"Right foot/ankle Subtalar Joint fusion, Gastrocnemius recession, plantarflexory navicular cuneiform fusion. \"     Slowly rehabbing after that -   Still hard to get around       Saw DR Alford for her Atrial fibrillation   Getting Eliquis from [facility]       Shoulder pain recently -    lifted her up when she fell  -   Hard to lift it up  - and hurt her shoulder -   [facility]   [facility] scheduled       Mood has been poor  -   Has been very down and very tired -  Depressed that she is not functioning like she normally does due to her surgery   They both agree mood is like this daily -   And its just not like her           Chronic issues reviewed today -      Diabetes T 2:   Diagnosed:    1/2023       HBGA1C  May   13  -  6.5 %   TODAY -  6.5%         Todays Wt/BMI:     160   lbs   Last Wt/ BMI:    168      Known -    ASCVD?  Yes -  2020 Cor CT score of 500        Stress test 2023 -  WNL   HF?  NONE   CRD?   No      Microalb:     Date of last test:                 pos/neg      Current Medications for diabetes:        Metformin  mg  - 4 daily      ACE:   Yes - on Lisinopril-hctz  Statin:   on rosuvastatin   Antiplatelet agent:    on ASA 81 mg a day      Frequency of Blood Sugar Checks       Checks occas     -stays below 200        AM :        Post prandial:     Nutrition: tries to watch it   Exercise:  limited due to knees and foot      Last Eye Exam  -   2 years ago  - to make appt   Any Symptoms -      Last Foot Exam:  sees DR Galeas   Any symptoms - Rt foot numbness - chronic      Last Dentist Appt:   dentures      Vaccines:    declines         HTN -   Lisinopril- hydrochlorothiazide 10-12.5 mg daiy   Metoprolol 50 mg BID "     Hypokalemia - KCL 20 mQEU daily     Also on Metoprolol 50 mg BID for hx of palpitations     Hx of PE   2/2023  (was at [facility])  -  had been very sick with the flu -  was on Eliquis for 3 mos and then it was stopped     Left knee replaced   1/30/24  Right knee replaced  6/11/24   NO ISSUES WITH THESE SURGERIES    Saw DR Vasquez 2024  for her arthritis pain -   Placed on Prednisone taper -   Was on a few months         Does not get mammograms     Declines a flu shot       Review of Systems    Objective   /76 (BP Location: Right arm, Patient Position: Sitting, BP Cuff Size: Large adult)   Pulse 78   Wt 72.6 kg (160 lb)   SpO2 97%   BMI 29.26 kg/m²     Physical Exam  Vitals reviewed.   Constitutional:       General: She is not in acute distress.     Appearance: Normal appearance.      Comments: Using a walker    HENT:      Head: Normocephalic and atraumatic.      Nose: Nose normal.      Mouth/Throat:      Mouth: Mucous membranes are moist.      Pharynx: No posterior oropharyngeal erythema.   Eyes:      Extraocular Movements: Extraocular movements intact.      Conjunctiva/sclera: Conjunctivae normal.      Pupils: Pupils are equal, round, and reactive to light.   Cardiovascular:      Rate and Rhythm: Normal rate and regular rhythm.      Heart sounds: Normal heart sounds. No murmur heard.  Pulmonary:      Effort: Pulmonary effort is normal. No respiratory distress.      Breath sounds: Normal breath sounds. No wheezing.   Musculoskeletal:      Cervical back: No rigidity.   Lymphadenopathy:      Cervical: No cervical adenopathy.   Skin:     General: Skin is warm and dry.      Findings: No rash.   Neurological:      General: No focal deficit present.      Mental Status: She is alert. Mental status is at baseline.   Psychiatric:         Mood and Affect: Mood normal.         Thought Content: Thought content normal.         Assessment/Plan   Assessment & Plan  Type 2 diabetes mellitus without complication, without  long-term current use of insulin    Orders:    POCT glycosylated hemoglobin (Hb A1C) manually resulted    Moderate recurrent major depression    Orders:    buPROPion XL (Wellbutrin XL) 150 mg 24 hr tablet; Take 1 tablet (150 mg) by mouth once daily in the morning. Do not crush, chew, or split.    Agreed to try some bupropion   Education on medication discussed   Primary hypertension    Stable   Paroxysmal atrial fibrillation (Multi)    Discussed getting eliquis on PAP       ON prednisone recently again for her shoulder pain -   Weaning off -   Discussed trying a very low dose again if she gets worse after stopping it       We discussed at visit any disease processes that were of concern as well as the risks, benefits and instructions of any new medication provided.    See orders and discussion section for information provided to patient in their After Visit Summary.   Patient (and/or caretaker of patient if present)  stated all questions were answered, and they voiced understanding of instructions.

## 2025-04-30 ENCOUNTER — HOSPITAL ENCOUNTER (OUTPATIENT)
Dept: RADIOLOGY | Facility: CLINIC | Age: 75
Discharge: HOME | End: 2025-04-30
Payer: COMMERCIAL

## 2025-04-30 DIAGNOSIS — M25.511 PAIN IN RIGHT SHOULDER: ICD-10-CM

## 2025-04-30 PROCEDURE — 73221 MRI JOINT UPR EXTREM W/O DYE: CPT | Mod: RT

## 2025-05-12 ENCOUNTER — APPOINTMENT (OUTPATIENT)
Dept: RADIOLOGY | Facility: CLINIC | Age: 75
End: 2025-05-12
Payer: COMMERCIAL

## 2025-05-20 ENCOUNTER — TELEPHONE (OUTPATIENT)
Dept: PRIMARY CARE | Facility: CLINIC | Age: 75
End: 2025-05-20
Payer: COMMERCIAL

## 2025-05-20 NOTE — TELEPHONE ENCOUNTER
GAGE CALLING THINKS SHE HAD A MINI STROKE CAN'T WALK ALL THE SUDDEN PLEASE CALL HIM AT 5 HE WILL BE BY THE PHONE

## 2025-05-21 ENCOUNTER — TELEPHONE (OUTPATIENT)
Dept: PRIMARY CARE | Facility: CLINIC | Age: 75
End: 2025-05-21

## 2025-05-21 ENCOUNTER — OFFICE VISIT (OUTPATIENT)
Dept: PRIMARY CARE | Facility: CLINIC | Age: 75
End: 2025-05-21
Payer: COMMERCIAL

## 2025-05-21 ENCOUNTER — HOSPITAL ENCOUNTER (OUTPATIENT)
Dept: RADIOLOGY | Facility: HOSPITAL | Age: 75
Discharge: HOME | End: 2025-05-21
Payer: COMMERCIAL

## 2025-05-21 ENCOUNTER — APPOINTMENT (OUTPATIENT)
Dept: PRIMARY CARE | Facility: CLINIC | Age: 75
End: 2025-05-21
Payer: COMMERCIAL

## 2025-05-21 VITALS
HEART RATE: 61 BPM | OXYGEN SATURATION: 97 % | DIASTOLIC BLOOD PRESSURE: 80 MMHG | BODY MASS INDEX: 26.47 KG/M2 | WEIGHT: 164 LBS | SYSTOLIC BLOOD PRESSURE: 112 MMHG

## 2025-05-21 DIAGNOSIS — R53.1 GENERALIZED WEAKNESS: ICD-10-CM

## 2025-05-21 DIAGNOSIS — R41.82 ALTERED MENTAL STATUS, UNSPECIFIED: ICD-10-CM

## 2025-05-21 DIAGNOSIS — R41.82 ALTERED MENTAL STATUS, UNSPECIFIED ALTERED MENTAL STATUS TYPE: ICD-10-CM

## 2025-05-21 DIAGNOSIS — R41.82 ALTERED MENTAL STATUS, UNSPECIFIED: Primary | ICD-10-CM

## 2025-05-21 DIAGNOSIS — E11.9 TYPE 2 DIABETES MELLITUS WITHOUT COMPLICATION, WITHOUT LONG-TERM CURRENT USE OF INSULIN: ICD-10-CM

## 2025-05-21 DIAGNOSIS — G81.94 LEFT HEMIPLEGIA (MULTI): ICD-10-CM

## 2025-05-21 DIAGNOSIS — I48.0 PAROXYSMAL ATRIAL FIBRILLATION (MULTI): ICD-10-CM

## 2025-05-21 DIAGNOSIS — R41.82 ALTERED MENTAL STATUS, UNSPECIFIED ALTERED MENTAL STATUS TYPE: Primary | ICD-10-CM

## 2025-05-21 LAB
CREAT SERPL-MCNC: 0.6 MG/DL (ref 0.5–1.05)
EGFRCR SERPLBLD CKD-EPI 2021: >90 ML/MIN/1.73M*2
POC APPEARANCE, URINE: CLEAR
POC BILIRUBIN, URINE: ABNORMAL
POC BLOOD, URINE: NEGATIVE
POC COLOR, URINE: YELLOW
POC GLUCOSE, URINE: NEGATIVE MG/DL
POC KETONES, URINE: NEGATIVE MG/DL
POC LEUKOCYTES, URINE: NEGATIVE
POC NITRITE,URINE: NEGATIVE
POC PH, URINE: 6.5 PH
POC PROTEIN, URINE: NEGATIVE MG/DL
POC SPECIFIC GRAVITY, URINE: 1.02
POC UROBILINOGEN, URINE: 2 EU/DL

## 2025-05-21 PROCEDURE — 81002 URINALYSIS NONAUTO W/O SCOPE: CPT | Performed by: FAMILY MEDICINE

## 2025-05-21 PROCEDURE — 99214 OFFICE O/P EST MOD 30 MIN: CPT | Performed by: FAMILY MEDICINE

## 2025-05-21 PROCEDURE — 3074F SYST BP LT 130 MM HG: CPT | Performed by: FAMILY MEDICINE

## 2025-05-21 PROCEDURE — 1160F RVW MEDS BY RX/DR IN RCRD: CPT | Performed by: FAMILY MEDICINE

## 2025-05-21 PROCEDURE — 1036F TOBACCO NON-USER: CPT | Performed by: FAMILY MEDICINE

## 2025-05-21 PROCEDURE — 70470 CT HEAD/BRAIN W/O & W/DYE: CPT

## 2025-05-21 PROCEDURE — 2550000001 HC RX 255 CONTRASTS: Performed by: FAMILY MEDICINE

## 2025-05-21 PROCEDURE — 82565 ASSAY OF CREATININE: CPT | Performed by: FAMILY MEDICINE

## 2025-05-21 PROCEDURE — 36415 COLL VENOUS BLD VENIPUNCTURE: CPT | Performed by: FAMILY MEDICINE

## 2025-05-21 PROCEDURE — 1159F MED LIST DOCD IN RCRD: CPT | Performed by: FAMILY MEDICINE

## 2025-05-21 PROCEDURE — 3044F HG A1C LEVEL LT 7.0%: CPT | Performed by: FAMILY MEDICINE

## 2025-05-21 PROCEDURE — 3079F DIAST BP 80-89 MM HG: CPT | Performed by: FAMILY MEDICINE

## 2025-05-21 RX ADMIN — IOHEXOL 75 ML: 350 INJECTION, SOLUTION INTRAVENOUS at 18:29

## 2025-05-21 ASSESSMENT — PATIENT HEALTH QUESTIONNAIRE - PHQ9
1. LITTLE INTEREST OR PLEASURE IN DOING THINGS: SEVERAL DAYS
2. FEELING DOWN, DEPRESSED OR HOPELESS: SEVERAL DAYS
SUM OF ALL RESPONSES TO PHQ9 QUESTIONS 1 AND 2: 2

## 2025-05-21 NOTE — TELEPHONE ENCOUNTER
the  called back this morning (you called last night possible stroke) he wanted to bring her in to see you I explained that with her symptoms you wanted her to go to the ER and that even if he brought her here he would have a high chance of still having to go to the er he wanted to know if the medication could be causing this?

## 2025-05-21 NOTE — PROGRESS NOTES
Subjective   Patient ID: Cassidy Osorio is a 74 y.o. female who presents for Numbness (Numbness in fingers, can barely walk and confusion. Pain and weakness.  Unable to concentrate or focus.  No balance. Started a week and a half ago and getting gradually worse.).    HPI     Here with  -   [facility]ef[facility] Morris couple     LOV for meds 4/28/25      called yesterday  -   Was concerned about pt being weak and not thinking correctly -   We advised taking her to the ER - he felt she was not bad enough for that  -   I did speak to them on the phone earlier today  - and we decided to have her come into the office.       Here today now -     In the last 10 days -   Hardly able to walk  -   Very weak   Started 10 days ago and not improving     Hallucinating at times  - seems confused often   Not functioning well -   Left hand feels numb      Having a hard time coordinating her fingers  -   Cannot pin her dress about a week     Seems helpless       Did start the bupropion about a month ago for depression - mood is very poor     Eliquis was a new medication for parox afib 1/2025      Patient Active Problem List   Diagnosis    [facility] coronary artery calcium score greater than 400    Diabetes mellitus, type 2 (Multi)    Benign paroxysmal positional vertigo    Hypertension    Tricuspid valve disease    Vitamin D deficiency    Arthritis of midfoot    Dyspnea    HLD (hyperlipidemia)    Pain in joint involving ankle and foot    Right foot pain    History of pulmonary embolism    History of left knee replacement    Paroxysmal atrial fibrillation (Multi)        Current Outpatient Medications:     apixaban (Eliquis) 5 mg tablet, Take 1 tablet (5 mg) by mouth 2 times a day., Disp: 56 tablet, Rfl: 0    hydrOXYzine HCL (Atarax) 10 mg tablet, Take 1 tablet (10 mg) by mouth every 6 hours if needed for anxiety., Disp: 30 tablet, Rfl: 0    metFORMIN XR (Glucophage-XR) 500 mg 24 hr tablet, Take 4 tablets (2,000 mg) by mouth once daily in  the evening. Take with meals. Do not crush, chew, or split., Disp: 360 tablet, Rfl: 3    metoprolol tartrate (Lopressor) 50 mg tablet, TAKE ONE TABLET BY MOUTH TWO TIMES A DAY, Disp: 180 tablet, Rfl: 1    rosuvastatin (Crestor) 40 mg tablet, Take 1 tablet (40 mg) by mouth once daily., Disp: 90 tablet, Rfl: 3    lisinopriL-hydrochlorothiazide 10-12.5 mg tablet, Take 1 tablet by mouth once daily., Disp: 90 tablet, Rfl: 3       Review of Systems    Objective   /80 (BP Location: Right arm, Patient Position: Sitting, BP Cuff Size: Large adult)   Pulse 61   Wt 74.4 kg (164 lb)   SpO2 97%   BMI 26.47 kg/m²     Physical Exam  Vitals reviewed.   Constitutional:       General: She is not in acute distress.     Appearance: She is not ill-appearing.      Comments: Elderly Bahai woman in a wheelchair, left hand in her lap, using right hand more    HENT:      Head: Normocephalic and atraumatic.   Cardiovascular:      Rate and Rhythm: Normal rate and regular rhythm.   Pulmonary:      Effort: Pulmonary effort is normal.      Breath sounds: Normal breath sounds.   Abdominal:      Palpations: Abdomen is soft.      Tenderness: There is no abdominal tenderness.   Neurological:      Cranial Nerves: No cranial nerve deficit.      Motor: Weakness present.      Comments: Very quiet -   CN intact  Her coordination of her fingers for thumb to each finger test is very poor -   And motor function on left leg is 4/5 - obviously weaker than right          Assessment/Plan   Assessment & Plan  Altered mental status, unspecified altered mental status type    Orders:    POCT UA (nonautomated) manually resulted    CT head w IV contrast; Future    Comprehensive Metabolic Panel    CBC and Auto Differential    Left hemiplegia (Multi)    Orders:    CT head w IV contrast; Future    Generalized weakness    Orders:    CT head w IV contrast; Future    Paroxysmal atrial fibrillation (Multi)  On Eliquis -   Rate controlled          Type 2 diabetes  mellitus without complication, without long-term current use of insulin  Last assessment - sugars were doing well              Discussed with pt and her  that I am VERY concerned she has a CVA -   Would have been 10 days ago -  And because of the Eliquis - the concern is that its a bleed in the brain .   Discussed proceeding to ER ASAP -   But they were not eager to do that -   Wondered about outpatient testing   (And since this is 10 days old and she has not progressively gotten worse in the 10 days  - if there is a bleed its likely stable)  -   I agreed to place the order and we will have to see if it can be done within 24 hours.   But I talked extensively about risks with the bleed on the brain and pressure on her brain  -   They seemed to understand.   Urged to stay at hospital if it can be done to proceed to ER if blood is seen on the CT scan.

## 2025-05-22 ENCOUNTER — APPOINTMENT (OUTPATIENT)
Dept: RADIOLOGY | Facility: HOSPITAL | Age: 75
End: 2025-05-22
Payer: COMMERCIAL

## 2025-05-22 ENCOUNTER — HOSPITAL ENCOUNTER (INPATIENT)
Facility: HOSPITAL | Age: 75
LOS: 1 days | Discharge: HOME HEALTH | End: 2025-05-24
Attending: EMERGENCY MEDICINE | Admitting: STUDENT IN AN ORGANIZED HEALTH CARE EDUCATION/TRAINING PROGRAM
Payer: COMMERCIAL

## 2025-05-22 ENCOUNTER — APPOINTMENT (OUTPATIENT)
Dept: CARDIOLOGY | Facility: HOSPITAL | Age: 75
End: 2025-05-22
Payer: COMMERCIAL

## 2025-05-22 ENCOUNTER — TELEPHONE (OUTPATIENT)
Dept: PRIMARY CARE | Facility: CLINIC | Age: 75
End: 2025-05-22
Payer: COMMERCIAL

## 2025-05-22 DIAGNOSIS — I10 HYPERTENSION, UNSPECIFIED TYPE: ICD-10-CM

## 2025-05-22 DIAGNOSIS — R27.0 ATAXIA: Primary | ICD-10-CM

## 2025-05-22 DIAGNOSIS — I63.9 CEREBROVASCULAR ACCIDENT (CVA), UNSPECIFIED MECHANISM (MULTI): ICD-10-CM

## 2025-05-22 DIAGNOSIS — R93.1 AGATSTON CORONARY ARTERY CALCIUM SCORE GREATER THAN 400: ICD-10-CM

## 2025-05-22 DIAGNOSIS — I10 PRIMARY HYPERTENSION: ICD-10-CM

## 2025-05-22 DIAGNOSIS — E87.6 HYPOKALEMIA: ICD-10-CM

## 2025-05-22 DIAGNOSIS — E11.9 TYPE 2 DIABETES MELLITUS WITHOUT COMPLICATION, WITHOUT LONG-TERM CURRENT USE OF INSULIN: ICD-10-CM

## 2025-05-22 PROBLEM — M54.50 ACUTE MIDLINE LOW BACK PAIN: Status: ACTIVE | Noted: 2025-05-22

## 2025-05-22 LAB
ALBUMIN SERPL BCP-MCNC: 3.9 G/DL (ref 3.4–5)
ALBUMIN SERPL-MCNC: 4 G/DL (ref 3.6–5.1)
ALP SERPL-CCNC: 58 U/L (ref 33–136)
ALP SERPL-CCNC: 64 U/L (ref 37–153)
ALT SERPL W P-5'-P-CCNC: 17 U/L (ref 7–45)
ALT SERPL-CCNC: 17 U/L (ref 6–29)
ANION GAP SERPL CALCULATED.3IONS-SCNC: 12 MMOL/L (ref 10–20)
ANION GAP SERPL CALCULATED.4IONS-SCNC: 8 MMOL/L (CALC) (ref 7–17)
APTT PPP: 27.8 SECONDS (ref 22–32.5)
AST SERPL W P-5'-P-CCNC: 15 U/L (ref 9–39)
AST SERPL-CCNC: 17 U/L (ref 10–35)
BASOPHILS # BLD AUTO: 0.07 X10*3/UL (ref 0–0.1)
BASOPHILS # BLD AUTO: 29 CELLS/UL (ref 0–200)
BASOPHILS NFR BLD AUTO: 0.5 %
BASOPHILS NFR BLD AUTO: 1 %
BILIRUB SERPL-MCNC: 0.6 MG/DL (ref 0.2–1.2)
BILIRUB SERPL-MCNC: 0.6 MG/DL (ref 0–1.2)
BNP SERPL-MCNC: 66 PG/ML (ref 0–99)
BUN SERPL-MCNC: 14 MG/DL (ref 6–23)
BUN SERPL-MCNC: 17 MG/DL (ref 7–25)
CALCIUM SERPL-MCNC: 9.2 MG/DL (ref 8.6–10.3)
CALCIUM SERPL-MCNC: 9.3 MG/DL (ref 8.6–10.4)
CARDIAC TROPONIN I PNL SERPL HS: 5 NG/L (ref 0–13)
CHLORIDE SERPL-SCNC: 106 MMOL/L (ref 98–107)
CHLORIDE SERPL-SCNC: 106 MMOL/L (ref 98–110)
CHOLEST SERPL-MCNC: 134 MG/DL (ref 0–199)
CHOLESTEROL/HDL RATIO: 2.4
CO2 SERPL-SCNC: 25 MMOL/L (ref 21–32)
CO2 SERPL-SCNC: 26 MMOL/L (ref 20–32)
CREAT SERPL-MCNC: 0.63 MG/DL (ref 0.6–1)
CREAT SERPL-MCNC: 0.73 MG/DL (ref 0.5–1.05)
EGFRCR SERPLBLD CKD-EPI 2021: 86 ML/MIN/1.73M*2
EGFRCR SERPLBLD CKD-EPI 2021: 92 ML/MIN/1.73M2
EOSINOPHIL # BLD AUTO: 0.27 X10*3/UL (ref 0–0.4)
EOSINOPHIL # BLD AUTO: 70 CELLS/UL (ref 15–500)
EOSINOPHIL NFR BLD AUTO: 1.2 %
EOSINOPHIL NFR BLD AUTO: 4 %
ERYTHROCYTE [DISTWIDTH] IN BLOOD BY AUTOMATED COUNT: 12.7 % (ref 11–15)
ERYTHROCYTE [DISTWIDTH] IN BLOOD BY AUTOMATED COUNT: 13.1 % (ref 11.5–14.5)
GLUCOSE BLD MANUAL STRIP-MCNC: 122 MG/DL (ref 74–99)
GLUCOSE BLD MANUAL STRIP-MCNC: 189 MG/DL (ref 74–99)
GLUCOSE SERPL-MCNC: 138 MG/DL (ref 65–99)
GLUCOSE SERPL-MCNC: 161 MG/DL (ref 74–99)
HCT VFR BLD AUTO: 40 % (ref 35–45)
HCT VFR BLD AUTO: 40.8 % (ref 36–46)
HDLC SERPL-MCNC: 57 MG/DL
HGB BLD-MCNC: 13.1 G/DL (ref 11.7–15.5)
HGB BLD-MCNC: 13.5 G/DL (ref 12–16)
IMM GRANULOCYTES # BLD AUTO: 0.01 X10*3/UL (ref 0–0.5)
IMM GRANULOCYTES NFR BLD AUTO: 0.1 % (ref 0–0.9)
INR PPP: 1.1 (ref 0.9–1.2)
LDLC SERPL CALC-MCNC: 56 MG/DL
LYMPHOCYTES # BLD AUTO: 1676 CELLS/UL (ref 850–3900)
LYMPHOCYTES # BLD AUTO: 2.23 X10*3/UL (ref 0.8–3)
LYMPHOCYTES NFR BLD AUTO: 28.9 %
LYMPHOCYTES NFR BLD AUTO: 33.3 %
MAGNESIUM SERPL-MCNC: 1.93 MG/DL (ref 1.6–2.4)
MCH RBC QN AUTO: 29.7 PG (ref 26–34)
MCH RBC QN AUTO: 30.4 PG (ref 27–33)
MCHC RBC AUTO-ENTMCNC: 32.8 G/DL (ref 32–36)
MCHC RBC AUTO-ENTMCNC: 33.1 G/DL (ref 32–36)
MCV RBC AUTO: 90 FL (ref 80–100)
MCV RBC AUTO: 92.8 FL (ref 80–100)
MONOCYTES # BLD AUTO: 0.82 X10*3/UL (ref 0.05–0.8)
MONOCYTES # BLD AUTO: 505 CELLS/UL (ref 200–950)
MONOCYTES NFR BLD AUTO: 12.2 %
MONOCYTES NFR BLD AUTO: 8.7 %
NEUTROPHILS # BLD AUTO: 3.3 X10*3/UL (ref 1.6–5.5)
NEUTROPHILS # BLD AUTO: 3521 CELLS/UL (ref 1500–7800)
NEUTROPHILS NFR BLD AUTO: 49.4 %
NEUTROPHILS NFR BLD AUTO: 60.7 %
NON HDL CHOLESTEROL: 77 MG/DL (ref 0–149)
NRBC BLD-RTO: 0 /100 WBCS (ref 0–0)
PLATELET # BLD AUTO: 184 X10*3/UL (ref 150–450)
PLATELET # BLD AUTO: 200 THOUSAND/UL (ref 140–400)
PMV BLD REES-ECKER: 9.4 FL (ref 7.5–12.5)
POTASSIUM SERPL-SCNC: 3.5 MMOL/L (ref 3.5–5.3)
POTASSIUM SERPL-SCNC: 3.7 MMOL/L (ref 3.5–5.3)
PROT SERPL-MCNC: 6.3 G/DL (ref 6.1–8.1)
PROT SERPL-MCNC: 6.6 G/DL (ref 6.4–8.2)
PROTHROMBIN TIME: 11.9 SECONDS (ref 9.3–12.7)
RBC # BLD AUTO: 4.31 MILLION/UL (ref 3.8–5.1)
RBC # BLD AUTO: 4.55 X10*6/UL (ref 4–5.2)
SODIUM SERPL-SCNC: 139 MMOL/L (ref 136–145)
SODIUM SERPL-SCNC: 140 MMOL/L (ref 135–146)
TRIGL SERPL-MCNC: 107 MG/DL (ref 0–149)
VLDL: 21 MG/DL (ref 0–40)
WBC # BLD AUTO: 5.8 THOUSAND/UL (ref 3.8–10.8)
WBC # BLD AUTO: 6.7 X10*3/UL (ref 4.4–11.3)

## 2025-05-22 PROCEDURE — 70551 MRI BRAIN STEM W/O DYE: CPT

## 2025-05-22 PROCEDURE — 71045 X-RAY EXAM CHEST 1 VIEW: CPT | Mod: FOREIGN READ | Performed by: STUDENT IN AN ORGANIZED HEALTH CARE EDUCATION/TRAINING PROGRAM

## 2025-05-22 PROCEDURE — 70544 MR ANGIOGRAPHY HEAD W/O DYE: CPT

## 2025-05-22 PROCEDURE — 70544 MR ANGIOGRAPHY HEAD W/O DYE: CPT | Performed by: RADIOLOGY

## 2025-05-22 PROCEDURE — 72131 CT LUMBAR SPINE W/O DYE: CPT

## 2025-05-22 PROCEDURE — 80061 LIPID PANEL: CPT | Performed by: STUDENT IN AN ORGANIZED HEALTH CARE EDUCATION/TRAINING PROGRAM

## 2025-05-22 PROCEDURE — 72131 CT LUMBAR SPINE W/O DYE: CPT | Performed by: STUDENT IN AN ORGANIZED HEALTH CARE EDUCATION/TRAINING PROGRAM

## 2025-05-22 PROCEDURE — 93005 ELECTROCARDIOGRAM TRACING: CPT

## 2025-05-22 PROCEDURE — 99291 CRITICAL CARE FIRST HOUR: CPT | Mod: 25 | Performed by: EMERGENCY MEDICINE

## 2025-05-22 PROCEDURE — 85610 PROTHROMBIN TIME: CPT

## 2025-05-22 PROCEDURE — 70547 MR ANGIOGRAPHY NECK W/O DYE: CPT | Performed by: RADIOLOGY

## 2025-05-22 PROCEDURE — 99222 1ST HOSP IP/OBS MODERATE 55: CPT | Performed by: STUDENT IN AN ORGANIZED HEALTH CARE EDUCATION/TRAINING PROGRAM

## 2025-05-22 PROCEDURE — G0378 HOSPITAL OBSERVATION PER HR: HCPCS

## 2025-05-22 PROCEDURE — 36415 COLL VENOUS BLD VENIPUNCTURE: CPT | Performed by: STUDENT IN AN ORGANIZED HEALTH CARE EDUCATION/TRAINING PROGRAM

## 2025-05-22 PROCEDURE — 2500000004 HC RX 250 GENERAL PHARMACY W/ HCPCS (ALT 636 FOR OP/ED): Performed by: STUDENT IN AN ORGANIZED HEALTH CARE EDUCATION/TRAINING PROGRAM

## 2025-05-22 PROCEDURE — 70450 CT HEAD/BRAIN W/O DYE: CPT | Performed by: RADIOLOGY

## 2025-05-22 PROCEDURE — 83880 ASSAY OF NATRIURETIC PEPTIDE: CPT | Performed by: STUDENT IN AN ORGANIZED HEALTH CARE EDUCATION/TRAINING PROGRAM

## 2025-05-22 PROCEDURE — 83036 HEMOGLOBIN GLYCOSYLATED A1C: CPT | Mod: TRILAB | Performed by: STUDENT IN AN ORGANIZED HEALTH CARE EDUCATION/TRAINING PROGRAM

## 2025-05-22 PROCEDURE — 83735 ASSAY OF MAGNESIUM: CPT

## 2025-05-22 PROCEDURE — 70450 CT HEAD/BRAIN W/O DYE: CPT

## 2025-05-22 PROCEDURE — 82947 ASSAY GLUCOSE BLOOD QUANT: CPT

## 2025-05-22 PROCEDURE — 71045 X-RAY EXAM CHEST 1 VIEW: CPT

## 2025-05-22 PROCEDURE — 80053 COMPREHEN METABOLIC PANEL: CPT

## 2025-05-22 PROCEDURE — 84484 ASSAY OF TROPONIN QUANT: CPT

## 2025-05-22 PROCEDURE — 36415 COLL VENOUS BLD VENIPUNCTURE: CPT

## 2025-05-22 PROCEDURE — 85025 COMPLETE CBC W/AUTO DIFF WBC: CPT

## 2025-05-22 PROCEDURE — 70547 MR ANGIOGRAPHY NECK W/O DYE: CPT

## 2025-05-22 PROCEDURE — 70551 MRI BRAIN STEM W/O DYE: CPT | Performed by: RADIOLOGY

## 2025-05-22 PROCEDURE — 85730 THROMBOPLASTIN TIME PARTIAL: CPT

## 2025-05-22 PROCEDURE — 2500000001 HC RX 250 WO HCPCS SELF ADMINISTERED DRUGS (ALT 637 FOR MEDICARE OP): Performed by: STUDENT IN AN ORGANIZED HEALTH CARE EDUCATION/TRAINING PROGRAM

## 2025-05-22 PROCEDURE — 97162 PT EVAL MOD COMPLEX 30 MIN: CPT | Mod: GP

## 2025-05-22 RX ORDER — POTASSIUM CHLORIDE 20 MEQ/1
20 TABLET, EXTENDED RELEASE ORAL DAILY
Status: DISCONTINUED | OUTPATIENT
Start: 2025-05-23 | End: 2025-05-24 | Stop reason: HOSPADM

## 2025-05-22 RX ORDER — LIDOCAINE 560 MG/1
2 PATCH PERCUTANEOUS; TOPICAL; TRANSDERMAL DAILY PRN
Status: DISCONTINUED | OUTPATIENT
Start: 2025-05-22 | End: 2025-05-24 | Stop reason: HOSPADM

## 2025-05-22 RX ORDER — LORAZEPAM 2 MG/ML
1 INJECTION INTRAMUSCULAR EVERY 5 MIN PRN
Status: DISCONTINUED | OUTPATIENT
Start: 2025-05-22 | End: 2025-05-24 | Stop reason: HOSPADM

## 2025-05-22 RX ORDER — NAPROXEN SODIUM 220 MG/1
81 TABLET, FILM COATED ORAL DAILY
Status: DISCONTINUED | OUTPATIENT
Start: 2025-05-22 | End: 2025-05-23

## 2025-05-22 RX ORDER — ASPIRIN 300 MG/1
300 SUPPOSITORY RECTAL DAILY
Status: DISCONTINUED | OUTPATIENT
Start: 2025-05-22 | End: 2025-05-23

## 2025-05-22 RX ORDER — LABETALOL HYDROCHLORIDE 5 MG/ML
10 INJECTION, SOLUTION INTRAVENOUS EVERY 10 MIN PRN
Status: ACTIVE | OUTPATIENT
Start: 2025-05-22 | End: 2025-05-24

## 2025-05-22 RX ORDER — ATORVASTATIN CALCIUM 40 MG/1
40 TABLET, FILM COATED ORAL NIGHTLY
Status: DISCONTINUED | OUTPATIENT
Start: 2025-05-22 | End: 2025-05-24 | Stop reason: HOSPADM

## 2025-05-22 RX ORDER — METOPROLOL TARTRATE 25 MG/1
25 TABLET, FILM COATED ORAL 2 TIMES DAILY
Status: DISCONTINUED | OUTPATIENT
Start: 2025-05-22 | End: 2025-05-24 | Stop reason: HOSPADM

## 2025-05-22 RX ORDER — ASPIRIN 81 MG/1
81 TABLET ORAL DAILY
Status: DISCONTINUED | OUTPATIENT
Start: 2025-05-22 | End: 2025-05-23

## 2025-05-22 RX ORDER — CYCLOBENZAPRINE HCL 10 MG
5 TABLET ORAL 3 TIMES DAILY PRN
Status: DISCONTINUED | OUTPATIENT
Start: 2025-05-22 | End: 2025-05-24 | Stop reason: HOSPADM

## 2025-05-22 RX ADMIN — CYCLOBENZAPRINE 5 MG: 10 TABLET, FILM COATED ORAL at 21:12

## 2025-05-22 RX ADMIN — LORAZEPAM 1 MG: 2 INJECTION INTRAMUSCULAR; INTRAVENOUS at 15:00

## 2025-05-22 RX ADMIN — ATORVASTATIN CALCIUM 40 MG: 40 TABLET, FILM COATED ORAL at 21:09

## 2025-05-22 RX ADMIN — APIXABAN 5 MG: 5 TABLET, FILM COATED ORAL at 21:09

## 2025-05-22 RX ADMIN — METOPROLOL TARTRATE 25 MG: 25 TABLET, FILM COATED ORAL at 21:09

## 2025-05-22 RX ADMIN — ASPIRIN 81 MG: 81 TABLET, COATED ORAL at 14:31

## 2025-05-22 SDOH — SOCIAL STABILITY: SOCIAL INSECURITY: ABUSE: ADULT

## 2025-05-22 SDOH — SOCIAL STABILITY: SOCIAL INSECURITY: WITHIN THE LAST YEAR, HAVE YOU BEEN HUMILIATED OR EMOTIONALLY ABUSED IN OTHER WAYS BY YOUR PARTNER OR EX-PARTNER?: NO

## 2025-05-22 SDOH — ECONOMIC STABILITY: INCOME INSECURITY: IN THE PAST 12 MONTHS HAS THE ELECTRIC, GAS, OIL, OR WATER COMPANY THREATENED TO SHUT OFF SERVICES IN YOUR HOME?: NO

## 2025-05-22 SDOH — ECONOMIC STABILITY: HOUSING INSECURITY: IN THE LAST 12 MONTHS, WAS THERE A TIME WHEN YOU WERE NOT ABLE TO PAY THE MORTGAGE OR RENT ON TIME?: NO

## 2025-05-22 SDOH — SOCIAL STABILITY: SOCIAL INSECURITY: WERE YOU ABLE TO COMPLETE ALL THE BEHAVIORAL HEALTH SCREENINGS?: NO

## 2025-05-22 SDOH — ECONOMIC STABILITY: FOOD INSECURITY: HOW HARD IS IT FOR YOU TO PAY FOR THE VERY BASICS LIKE FOOD, HOUSING, MEDICAL CARE, AND HEATING?: NOT HARD AT ALL

## 2025-05-22 SDOH — ECONOMIC STABILITY: HOUSING INSECURITY: AT ANY TIME IN THE PAST 12 MONTHS, WERE YOU HOMELESS OR LIVING IN A SHELTER (INCLUDING NOW)?: NO

## 2025-05-22 SDOH — SOCIAL STABILITY: SOCIAL INSECURITY
WITHIN THE LAST YEAR, HAVE YOU BEEN KICKED, HIT, SLAPPED, OR OTHERWISE PHYSICALLY HURT BY YOUR PARTNER OR EX-PARTNER?: NO

## 2025-05-22 SDOH — ECONOMIC STABILITY: FOOD INSECURITY: WITHIN THE PAST 12 MONTHS, THE FOOD YOU BOUGHT JUST DIDN'T LAST AND YOU DIDN'T HAVE MONEY TO GET MORE.: NEVER TRUE

## 2025-05-22 SDOH — ECONOMIC STABILITY: FOOD INSECURITY: WITHIN THE PAST 12 MONTHS, YOU WORRIED THAT YOUR FOOD WOULD RUN OUT BEFORE YOU GOT THE MONEY TO BUY MORE.: NEVER TRUE

## 2025-05-22 SDOH — SOCIAL STABILITY: SOCIAL INSECURITY
WITHIN THE LAST YEAR, HAVE YOU BEEN RAPED OR FORCED TO HAVE ANY KIND OF SEXUAL ACTIVITY BY YOUR PARTNER OR EX-PARTNER?: NO

## 2025-05-22 SDOH — SOCIAL STABILITY: SOCIAL INSECURITY: WITHIN THE LAST YEAR, HAVE YOU BEEN AFRAID OF YOUR PARTNER OR EX-PARTNER?: NO

## 2025-05-22 SDOH — SOCIAL STABILITY: SOCIAL INSECURITY: DO YOU FEEL ANYONE HAS EXPLOITED OR TAKEN ADVANTAGE OF YOU FINANCIALLY OR OF YOUR PERSONAL PROPERTY?: NO

## 2025-05-22 SDOH — SOCIAL STABILITY: SOCIAL INSECURITY: DOES ANYONE TRY TO KEEP YOU FROM HAVING/CONTACTING OTHER FRIENDS OR DOING THINGS OUTSIDE YOUR HOME?: NO

## 2025-05-22 SDOH — SOCIAL STABILITY: SOCIAL INSECURITY: ARE THERE ANY APPARENT SIGNS OF INJURIES/BEHAVIORS THAT COULD BE RELATED TO ABUSE/NEGLECT?: NO

## 2025-05-22 SDOH — SOCIAL STABILITY: SOCIAL INSECURITY: HAVE YOU HAD THOUGHTS OF HARMING ANYONE ELSE?: NO

## 2025-05-22 SDOH — SOCIAL STABILITY: SOCIAL INSECURITY: DO YOU FEEL UNSAFE GOING BACK TO THE PLACE WHERE YOU ARE LIVING?: NO

## 2025-05-22 SDOH — ECONOMIC STABILITY: HOUSING INSECURITY: IN THE PAST 12 MONTHS, HOW MANY TIMES HAVE YOU MOVED WHERE YOU WERE LIVING?: 1

## 2025-05-22 SDOH — SOCIAL STABILITY: SOCIAL INSECURITY: HAS ANYONE EVER THREATENED TO HURT YOUR FAMILY OR YOUR PETS?: NO

## 2025-05-22 SDOH — ECONOMIC STABILITY: TRANSPORTATION INSECURITY: IN THE PAST 12 MONTHS, HAS LACK OF TRANSPORTATION KEPT YOU FROM MEDICAL APPOINTMENTS OR FROM GETTING MEDICATIONS?: NO

## 2025-05-22 SDOH — SOCIAL STABILITY: SOCIAL INSECURITY: ARE YOU OR HAVE YOU BEEN THREATENED OR ABUSED PHYSICALLY, EMOTIONALLY, OR SEXUALLY BY ANYONE?: NO

## 2025-05-22 ASSESSMENT — ENCOUNTER SYMPTOMS
NUMBNESS: 1
BACK PAIN: 1
WEAKNESS: 1

## 2025-05-22 ASSESSMENT — COGNITIVE AND FUNCTIONAL STATUS - GENERAL
HELP NEEDED FOR BATHING: A LITTLE
EATING MEALS: A LITTLE
DRESSING REGULAR UPPER BODY CLOTHING: A LOT
DAILY ACTIVITIY SCORE: 18
PERSONAL GROOMING: A LITTLE
DRESSING REGULAR LOWER BODY CLOTHING: A LOT
PATIENT BASELINE BEDBOUND: NO
DAILY ACTIVITIY SCORE: 14
WALKING IN HOSPITAL ROOM: A LITTLE
CLIMB 3 TO 5 STEPS WITH RAILING: A LOT
TOILETING: A LOT
MOBILITY SCORE: 13
HELP NEEDED FOR BATHING: A LOT
MOBILITY SCORE: 17
MOVING TO AND FROM BED TO CHAIR: A LITTLE
PERSONAL GROOMING: A LITTLE
WALKING IN HOSPITAL ROOM: TOTAL
STANDING UP FROM CHAIR USING ARMS: A LITTLE
MOVING FROM LYING ON BACK TO SITTING ON SIDE OF FLAT BED WITH BEDRAILS: A LITTLE
MOVING FROM LYING ON BACK TO SITTING ON SIDE OF FLAT BED WITH BEDRAILS: A LITTLE
CLIMB 3 TO 5 STEPS WITH RAILING: TOTAL
EATING MEALS: A LITTLE
DRESSING REGULAR UPPER BODY CLOTHING: A LITTLE
TURNING FROM BACK TO SIDE WHILE IN FLAT BAD: A LITTLE
TURNING FROM BACK TO SIDE WHILE IN FLAT BAD: A LITTLE
DRESSING REGULAR LOWER BODY CLOTHING: A LITTLE
STANDING UP FROM CHAIR USING ARMS: A LITTLE
MOVING TO AND FROM BED TO CHAIR: A LOT
TOILETING: A LITTLE

## 2025-05-22 ASSESSMENT — ACTIVITIES OF DAILY LIVING (ADL)
DRESSING YOURSELF: NEEDS ASSISTANCE
LACK_OF_TRANSPORTATION: NO
GROOMING: NEEDS ASSISTANCE
ADEQUATE_TO_COMPLETE_ADL: NO
WALKS IN HOME: NEEDS ASSISTANCE
FEEDING YOURSELF: NEEDS ASSISTANCE
TOILETING: NEEDS ASSISTANCE
JUDGMENT_ADEQUATE_SAFELY_COMPLETE_DAILY_ACTIVITIES: NO
PATIENT'S MEMORY ADEQUATE TO SAFELY COMPLETE DAILY ACTIVITIES?: NO
LACK_OF_TRANSPORTATION: NO
LACK_OF_TRANSPORTATION: NO
ADL_ASSISTANCE: INDEPENDENT
BATHING_ASSISTANCE: MODERATE
HEARING - RIGHT EAR: DIFFICULTY WITH NOISE
BATHING: NEEDS ASSISTANCE
HEARING - LEFT EAR: DIFFICULTY WITH NOISE

## 2025-05-22 ASSESSMENT — PAIN SCALES - GENERAL
PAINLEVEL_OUTOF10: 0 - NO PAIN
PAINLEVEL_OUTOF10: 2

## 2025-05-22 ASSESSMENT — LIFESTYLE VARIABLES
AUDIT-C TOTAL SCORE: 0
HOW OFTEN DO YOU HAVE 6 OR MORE DRINKS ON ONE OCCASION: NEVER
SKIP TO QUESTIONS 9-10: 1
HOW MANY STANDARD DRINKS CONTAINING ALCOHOL DO YOU HAVE ON A TYPICAL DAY: PATIENT DOES NOT DRINK
AUDIT-C TOTAL SCORE: 0
HOW OFTEN DO YOU HAVE A DRINK CONTAINING ALCOHOL: NEVER

## 2025-05-22 ASSESSMENT — PAIN - FUNCTIONAL ASSESSMENT
PAIN_FUNCTIONAL_ASSESSMENT: 0-10

## 2025-05-22 ASSESSMENT — PATIENT HEALTH QUESTIONNAIRE - PHQ9
2. FEELING DOWN, DEPRESSED OR HOPELESS: NOT AT ALL
1. LITTLE INTEREST OR PLEASURE IN DOING THINGS: NOT AT ALL
SUM OF ALL RESPONSES TO PHQ9 QUESTIONS 1 & 2: 0

## 2025-05-22 ASSESSMENT — COLUMBIA-SUICIDE SEVERITY RATING SCALE - C-SSRS
2. HAVE YOU ACTUALLY HAD ANY THOUGHTS OF KILLING YOURSELF?: NO
1. IN THE PAST MONTH, HAVE YOU WISHED YOU WERE DEAD OR WISHED YOU COULD GO TO SLEEP AND NOT WAKE UP?: NO
6. HAVE YOU EVER DONE ANYTHING, STARTED TO DO ANYTHING, OR PREPARED TO DO ANYTHING TO END YOUR LIFE?: NO

## 2025-05-22 NOTE — ASSESSMENT & PLAN NOTE
-reports episode occurred after she had foot surgery  -continue eliquis  -Continue metop with hold cathy

## 2025-05-22 NOTE — ASSESSMENT & PLAN NOTE
-with concern outpatient for stroke  -MRI/MRA added  -Aspirin/statin  -neuro consult  -considered echo, however discontinued because she has known Afib, will defer to neuro if they would like an echo  -neurochecks

## 2025-05-22 NOTE — PROGRESS NOTES
Evaluation    Patient Name: Cassidy Osorio  MRN: [id number]  Department: [facility] 3 S  Room: [address] [place], [place] [address]  Today's Date: 5/22/2025  Time Calculation  Start Time: 1414  Stop Time: 1429  Time Calculation (min): 15 min    Assessment  IP OT Assessment  OT Assessment: 73 yo female presents with weakness, confusion, intermittent hallucinations, and difficulty ambulating x ~10 days duration.  CT positive for an acute infarct.  On eval, patient presents significantly below baseline functional status d/t LUE coordination deficits, ataxia, impaired activity tolerance, and generalized weakness.  Pt requires OT services to provide ADL retraining utilizing compensatory techniques and progressive strengthening in order to increase functional capacity and safety with mobility prior to returning home.  Prognosis: Good  Barriers to Discharge Home: Caregiver assistance, Physical needs, Cognition needs  Caregiver Assistance: Caregiver assistance needed per identified barriers - however, level of patient's required assistance exceeds assistance available at home  Cognition Needs: Insight of patient limited regarding functional ability/needs  Physical Needs: Stair navigation into home limited by function/safety, Ambulating household distances limited by function/safety, 24hr mobility assistance needed, 24hr ADL assistance needed, High falls risk due to function or environment  Evaluation/Treatment Tolerance: Patient tolerated treatment well  Medical Staff Made Aware: Yes  End of Session Communication: Bedside nurse  End of Session Patient Position: Bed, 3 rail up, Alarm on    Plan:  Treatment Interventions: ADL retraining, Functional transfer training, UE strengthening/ROM, Endurance training, Patient/family training, Equipment evaluation/education, Fine motor coordination activities, Compensatory technique education  OT Frequency: 4 times per week  OT Discharge Recommendations: High intensity level of continued care  Equipment Recommended upon  Discharge: Wheeled walker  OT Recommended Transfer Status: Assist of 2      Subjective   Current Problem:  1. Ataxia        2. Hypertension, unspecified type          OT Visit Info:  OT Received On: 05/22/25    General Visit Info:  General  Reason for Referral: Impaired ADLs, ataxia  Referred By: Dr Alcala  Past Medical History Relevant to Rehab: a-fib, OA, DM, BPPV, obesity, tricuspid valve disease, LTKA, PE, HLD  Family/Caregiver Present: Yes  Caregiver Feedback: multiple including spouse - supportive  Co-Treatment: PT  Co-Treatment Reason: partial to maximize safety with mobility and outcome  Prior to Session Communication: Bedside nurse  Patient Position Received: Bed, 3 rail up, Alarm on  Preferred Learning Style: verbal, visual  General Comment: Cleared by nursing and agreeable for therapy    Precautions:  Hearing/Visual Limitations: +corrective lenses  Medical Precautions: Fall precautions     Date/Time Vitals Session Patient Position Pulse Resp SpO2 BP MAP (mmHg)    05/22/25 1337 --  --  50  18  98 %  159/75  103     05/22/25 1341 --  --  50  18  98 %  159/75  103      Pain:  Pain Assessment  Pain Assessment: 0-10  0-10 (Numeric) Pain Score: 0 - No pain      Objective   Cognition:  Overall Cognitive Status: Within Functional Limits, Impaired  Orientation Level: Oriented X4  Following Commands: Follows one step commands consistently  Insight: Mild    Home Living:  Type of Home: House  Lives With: Spouse  Home Adaptive Equipment: Walker rolling or standard  Home Layout: Multi-level, Able to live on main level with bedroom/bathroom  Home Access: Stairs to enter with rails  Entrance Stairs-Rails: Both  Entrance Stairs-Number of Steps: 5  Bathroom Shower/Tub: Tub/shower unit  Bathroom Equipment: Grab bars in shower, Shower chair with back     Prior Function:  Level of Howard: Independent with ADLs and functional transfers, Independent with homemaking with ambulation  Receives Help From:  Family  Ambulatory Assistance: Independent (with a 2 wheeled walker)  Hand Dominance: Right  Prior Function Comments: Pt was fully independent prior to symptom onset.    ADL:  Eating Assistance: Stand by  Eating Deficit: Setup  Grooming Assistance: Stand by  Grooming Deficit: Setup  Bathing Assistance: Moderate  Bathing Deficit: Buttocks, Right lower leg including foot, Left lower leg including foot  UE Dressing Assistance: Moderate  UE Dressing Deficit: Pull around back, Fasteners  LE Dressing Assistance: Maximal  LE Dressing Deficit: Pull up over hips, Fasteners, Don/doff R sock, Don/doff L sock, Don/doff R shoe, Don/doff L shoe  Toileting Assistance with Device: Maximal  Toileting Deficit: Clothing management up, Clothing management down  ADL Comments: all per clinical judgement this date    Activity Tolerance:  Endurance: Decreased tolerance for upright activites    Bed Mobility/Transfers: Bed Mobility  Bed Mobility: Yes  Bed Mobility 1  Bed Mobility 1: Supine to sitting  Level of Assistance 1: Close supervision  Bed Mobility Comments 1: toward the right side of bed with head elevated ~30 degrees  Bed Mobility 2  Bed Mobility  2: Sitting to supine  Level of Assistance 2: Moderate assistance, +2    Transfers  Transfer: Yes  Transfer 1  Transfer From 1: Bed to  Transfer to 1: Stand  Technique 1: Sit to stand, Stand to sit  Transfer Device 1: Walker  Transfer Level of Assistance 1: Minimum assistance  Trials/Comments 1: cues for hand placement    Functional Mobility:  Functional Mobility  Functional Mobility Performed:  (Mod assist x 2 to ambulate ~6' with a 2 wheeled walker with cues throughout for positoning and sequencing.  Gait extremely ataxic.)    Sensation:  Light Touch: No apparent deficits  Sensation Comment: Denies tingling/numbness    Strength:  Strength Comments: RUE=~4+/5, LUE=~4-/5    Coordination:  Movements are Fluid and Coordinated: No  Upper Body Coordination: Left gross/fine motor coordination  deficits  Finger to Nose: Impaired  Finger to Target: Impaired  Coordination Comment: +ataxia     Hand Function:  Hand Function  Gross Grasp: Functional  Coordination: Impaired    Extremities: RUE   RUE : Within Functional Limits and LUE   LUE: Within Functional Limits (for ROM)    Outcome Measures: [facility] Daily Activity  Putting on and taking off regular lower body clothing: A lot  Bathing (including washing, rinsing, drying): A lot  Putting on and taking off regular upper body clothing: A lot  Toileting, which includes using toilet, bedpan or urinal: A lot  Taking care of personal grooming such as brushing teeth: A little  Eating Meals: A little  Daily Activity - Total Score: 14    Education Documentation  ADL Training, taught by Nicolasa Richey, OT at 5/22/2025  2:53 PM.  Learner: Patient  Readiness: Acceptance  Method: Explanation  Response: Demonstrated Understanding  Comment: Functional transfer/mobility retraining    Goals:   Encounter Problems       Encounter Problems (Active)       OT Goals       ADLs (Progressing)       Start:  05/22/25    Expected End:  06/05/25       Patient will complete ADL tasks, with modified independent using AE need in order to increase patient's safety and independence with self-care tasks.           Functional transfers (Progressing)       Start:  05/22/25    Expected End:  06/05/25       Patient will complete functional transfers with modified independent using least restrictive device, in order to increase patient's safety and independence with daily tasks.           Activity tolerance (Progressing)       Start:  05/22/25    Expected End:  06/05/25       Patient will demonstrate the ability to participate in functional activity at least >/= 25 minutes in order to increase patient's safety and independence with daily tasks.

## 2025-05-22 NOTE — PROGRESS NOTES
Pharmacy Medication History Review    Cassidy Osorio is a 74 y.o. female admitted for Ataxia. Pharmacy reviewed the patient's [id number] medications and allergies for accuracy.    Medications ADDED:  N/A  Medications CHANGED:  N/A  Medications REMOVED:   Lisinopril/hydrochlorothiazide 10-12.5 mg  Metformin xr 500 mg     The list below reflects the updated PTA list.   Prior to Admission Medications   Prescriptions Last Dose Informant Patient Reported? Taking?   apixaban (Eliquis) 5 mg tablet 5/22/2025 Morning  No Yes   Sig: Take 1 tablet (5 mg) by mouth 2 times a day.   hydrOXYzine HCL (Atarax) 10 mg tablet 5/22/2025 Morning  No Yes   Sig: Take 1 tablet (10 mg) by mouth every 6 hours if needed for anxiety.   lisinopriL-hydrochlorothiazide 10-12.5 mg tablet Not Taking  No No   Sig: Take 1 tablet by mouth once daily.   Patient not taking: Reported on 5/22/2025   metFORMIN XR (Glucophage-XR) 500 mg 24 hr tablet Not Taking  No No   Sig: Take 4 tablets (2,000 mg) by mouth once daily in the evening. Take with meals. Do not crush, chew, or split.   Patient not taking: Reported on 5/22/2025   metoprolol tartrate (Lopressor) 50 mg tablet 5/22/2025  No Yes   Sig: TAKE ONE TABLET BY MOUTH TWO TIMES A DAY   Patient taking differently: Take 1 tablet by mouth 2 times a day.   potassium chloride CR (Klor-Con M20) 20 mEq ER tablet 5/21/2025 Morning  No Yes   Sig: Take 1 tablet (20 mEq) by mouth once daily. With a meal.   Do not crush or chew.   rosuvastatin (Crestor) 40 mg tablet 5/22/2025 Morning  No Yes   Sig: Take 1 tablet (40 mg) by mouth once daily.      Facility-Administered Medications: None        The list below reflects the updated allergy list. Please review each documented allergy for additional clarification and justification.  Allergies  Reviewed by Aga Siddiqui, PharmD on 5/22/2025   No Known Allergies         Patient declines M2B at discharge.     Sources:   Pharmacy dispense history  Patient Interview Moderate  historian  Chart Review     Additional Comments:  There was recent dispensing history of gabapentin 300 mg and wellbutrin  mg from 4/2025 but patient denied taking.      Aga Siddiqui, PharmD  05/22/25

## 2025-05-22 NOTE — PROGRESS NOTES
05/22/25 3035   Discharge Planning   Living Arrangements Family members;Children   Support Systems Spouse/significant other;Children;Family members   Assistance Needed needing assistance with ADLs and IADLs due to CVA   Type of Residence Private residence  (Multi-level, Able to live on main level with bedroom/bathroom)   Number of Stairs to Enter Residence 5   Do you have animals or pets at home? No   Who is requesting discharge planning? Provider   Home or Post Acute Services None   Expected Discharge Disposition Rehab  (Referral mad to [facility])   Does the patient need discharge transport arranged? Yes   RoundTrip coordination needed? Yes   Has discharge transport been arranged? No   Financial Resource Strain   How hard is it for you to pay for the very basics like food, housing, medical care, and heating? Not hard   Housing Stability   In the last 12 months, was there a time when you were not able to pay the mortgage or rent on time? N   In the past 12 months, how many times have you moved where you were living? 1   At any time in the past 12 months, were you homeless or living in a shelter (including now)? N   Transportation Needs   In the past 12 months, has lack of transportation kept you from medical appointments or from getting medications? no   In the past 12 months, has lack of transportation kept you from meetings, work, or from getting things needed for daily living? No   Patient Choice   Provider Choice list and CMS website (https://medicare.gov/care-compare#search) for post-acute Quality and Resource Measure Data were provided and reviewed with: Patient;Family   Patient / Family choosing to utilize agency / facility established prior to hospitalization Yes   Stroke Family Assessment   Stroke Family Assessment Needed Yes   Primary Caregiver/Family After Discharge Spouse/Significant Other   Physical Layout of Home Two Story  (Uses the main level.)   Caregiver/Family can provide assistance with ADL's Yes    Caregiver/Family can provide mobility assistance for transfers in and out of bed, chair or car Yes   Medications: Caregiver/Family can obtain prescriptions Yes   Medications: Caregiver/Family can assist with medication administration Yes   Medications: Caregiver/Family verbalizes understanding of medications Yes   Caregiver/Family agrees with discharge plan to home Other  (Referral made to [facility])   Caregiver/Family verbalizes capability of caring for patient at home Yes   Intensity of Service   Intensity of Service 0-30 min     74 year old female admits with ataxia, HTN, and r/o CVA. Hx of Afib. CT 5/21 shows mixed acuity infarct R parietal-occipital region. Patient has LLE weakness, LUE weakness, UE coordination deficits, and ataxic gait pattern.     Patient and Spouse who are [facility], live in an [facility], in a Multi-level house, Able to live on main level with bedroom/bathroom.  Home Adaptive Equipment: Walker rolling or standard  Grab bars in shower, Shower chair with back  Patient had been independent with ADLs and functional transfers, Independent with homemaking with ambulation.  Level of patient's required assistance exceeds assistance available at home.   Receives Help From: Family, [facility]     PT Discharge Recommendations: High intensity level of continued care  Equipment Recommended upon Discharge: Wheeled walker  PT Recommended Transfer Status: Assist x2, Assistive device    The above discussed with spouse, patient and family and are in agreement with Acute Rehab ([facility]), referral placed.     PLAN: Acute skilled rehab, will need a precert

## 2025-05-22 NOTE — ED PROCEDURE NOTE
Procedure  Critical Care    Performed by: Esther Acosta MD  Authorized by: Esther Acosta MD    Critical care provider statement:     Critical care time (minutes):  31    Critical care time was exclusive of:  Separately billable procedures and treating other patients and teaching time    Critical care was necessary to treat or prevent imminent or life-threatening deterioration of the following conditions: possible CVA.    Critical care was time spent personally by me on the following activities:  Blood draw for specimens, development of treatment plan with patient or surrogate, discussions with consultants, discussions with primary provider, evaluation of patient's response to treatment, examination of patient, ordering and performing treatments and interventions, ordering and review of laboratory studies, ordering and review of radiographic studies, pulse oximetry, re-evaluation of patient's condition, review of old charts and obtaining history from patient or surrogate    Care discussed with: admitting provider                 Esther Acosta MD  05/22/25 6092

## 2025-05-22 NOTE — H&P
History of Present Illness  Cassidy Osorio is a 74 y.o. female who presented with Cerebrovascular Accident (Pt had + CT for infarct from outpatient PCP. Pt states symptoms have been going on for 10 days but did not want initial LAI in ER. Sent in for eval and rescan. On thinners for AFIB. Denies neuro deficits at this time) and is admitted for Ataxia    Subjective    History assisted by her  at bedside. Last week she developed difficulty walking. Associated weakness of both legs and around same time decreased sensation of left hand. Denies headaches, vision changes, speech difficulties, ear fullness, sick symptoms, nausea or vomiting. On ROS she endorses back pain which also began around the same time. Denies any falls. She delayed presenting and had an outpatient CT ordered which was concerning for stroke and she was instructed to present. CT here (which was without contrast as opposed to the outpatient one which was with and without) did not have evidence of stroke.     Review of Systems   Musculoskeletal:  Positive for back pain and gait problem.   Neurological:  Positive for weakness and numbness.         History    Cassidy Osorio  has a past medical history of History of left knee replacement (01/30/2024), Influenza (04/28/2023), PE (pulmonary thromboembolism) (Multi) (02/13/2023), Personal history of other diseases of the circulatory system, Personal history of other diseases of the respiratory system (06/05/2018), and Personal history of other endocrine, nutritional and metabolic disease (08/22/2016).     Surgical History[1]   Patient  reports that she has never smoked. She has never used smokeless tobacco. She reports that she does not drink alcohol and does not use drugs.     Patient's family history includes Breast cancer in her mother; Skin cancer in her father.          Objective    Blood pressure (!) 186/92, pulse 53, temperature 36.4 °C (97.5 °F), temperature source Temporal, resp. rate 17, height  "1.676 m (5' 5.98\"), weight 74.6 kg (164 lb 7.4 oz), SpO2 97%.  Vitals Reviewed: yes  Constitutional: awake and alert, no acute distress, sitting on ER stretcher  Eyes: PERRL, EOMI, normal conjunctiva  HENT: moist mucus membranes, airway patent  Pulm: no wheezes, no rhonchi, no crackles, no coarse breath sounds  Cardiac: jessica rate, regular rhythm, no murmur  MSK: trace bilat lower extremity edema  Neuro: Bilat LE weakness, L slightly greater than right. Normal heel to shin. Reports decreased sensation of left hand which improves at rest but affects all finger  Psych: Cooperative, appropriate affect, unclear judgement      Intake/Output Summary (Last 24 hours) at 5/22/2025 1741  Last data filed at 5/22/2025 1712  Gross per 24 hour   Intake 240 ml   Output --   Net 240 ml       Relevant Results  CT lumbar spine wo IV contrast  Result Date: 5/22/2025  No acute findings of the lumbar spine. Mild multilevel spondylosis as above.   MACRO: None   Signed by: Nitesh Zelaya 5/22/2025 4:18 PM Dictation workstation:   [id number]    XR chest 1 view  Result Date: 5/22/2025  No radiographic evidence of acute cardiopulmonary disease. Signed by Rosendo Slaughter    CT head wo IV contrast  Result Date: 5/22/2025  No evidence of acute cortical infarct or intracranial hemorrhage.   Stable chronic changes.   MACRO: None   Signed by: Richmond Weiner 5/22/2025 11:40 AM Dictation workstation:   [id number]    CT head w and wo IV contrast  Addendum Date: 5/21/2025  Interpreted By:  Nitesh Zelaya, ADDENDUM: Findings were discussed with Dr. Jeanne Aguirre at 720 p.m. via telephone communication. This exam was performed as an outpatient, given the size of the infarct and mixed acuity, I recommended patient have further imaging workup in the emergency department/as an inpatient to help elucidate the etiology of the infarct as well as follow-up to exclude any hemorrhagic conversion.   Signed by: Nitesh Zelaya 5/21/2025 7:22 PM  "  -------- ORIGINAL REPORT -------- Dictation workstation:   [id number]    Result Date: 5/21/2025  Appearance of mixed acuity infarct in the right parieto-occipital region with subacute and chronic appearance as above. An acute on chronic event is not excluded. Recommend correlation with neurologic symptoms and stat MRI brain for further evaluation. No definitive intracranial hemorrhage is seen, although limited by diffuse heterogeneity in the right parieto-occipital parenchyma.   Signed by: Nitesh Garcia 5/21/2025 7:01 PM Dictation workstation:   [id number]      Microbiology  No results found for the last 90 days.      Scheduled medications  Scheduled Medications[2]  Continuous medications  Continuous Medications[3]  PRN medications  PRN Medications[4]        Assessment    Assessment & Plan  Ataxia  -with concern outpatient for stroke  -MRI/MRA added  -Aspirin/statin  -neuro consult  -considered echo, however discontinued because she has known Afib, will defer to neuro if they would like an echo  -neurochecks  Paroxysmal A-fib (Multi)  -reports episode occurred after she had foot surgery  -continue eliquis  -Continue metop with hold cathy  Acute midline low back pain  -ordered lumbar spine to rule out confounders to ataxia. No sign of compression    DVT prophylaxis: Eliquis  Disposition: Admit to stepdown      Josi Alcala         [1]   Past Surgical History:  Procedure Laterality Date    TOTAL KNEE ARTHROPLASTY  01/30/2024    TOTAL KNEE ARTHROPLASTY Right     6/2024 -DR Silvio Escalante   [2] apixaban, 5 mg, oral, BID  aspirin, 81 mg, oral, Daily   Or  aspirin, 81 mg, oral, Daily   Or  aspirin, 81 mg, nasogastric tube, Daily   Or  aspirin, 300 mg, rectal, Daily  atorvastatin, 40 mg, oral, Nightly  metoprolol tartrate, 25 mg, oral, BID  [START ON 5/23/2025] potassium chloride CR, 20 mEq, oral, Daily  [3]    [4] PRN medications: labetaloL, LORazepam, oxygen

## 2025-05-22 NOTE — PROGRESS NOTES
Attestation/Supervisory note for JANINA Jaquez      The patient is a 74-year-old female presenting to the emergency department at the direction of her primary care provider for evaluation of a possible stroke.  The patient reportedly started having issues with her balance about 10 days ago.  She states that she just felt off balance.  The patient was reportedly seen by her primary care physician and had an outpatient CT of her head because she did not want to be evaluated in the emergency room initially.  The CT reportedly showed that she had a large stroke per her primary care provider report and they were concerned that she may have hemorrhagic conversion because she does take blood thinners for her chronic atrial fibrillation.  The patient was sent to the emergency room by her primary care physician to be admitted and evaluated by neurology.  The patient denies having any current symptoms other than she states that she feels off balance when she is walking and standing.  She denies any headache or visual changes.  She denies any neck or back pain.  No chest pain or shortness of breath.  No abdominal pain.  No nausea vomiting.  No diarrhea or constipation.  No urinary complaints.  No fever or chills.  No cough or congestion.  No focal weakness or numbness.  No recent injury or trauma.  She does take Eliquis daily.  All pertinent positives and negatives are recorded above.  All other systems reviewed and otherwise negative.  Vital signs with mild hypertension and mild bradycardia but otherwise within normal limits.  Physical exam with a well-nourished well-developed female in no acute distress.  HEENT exam within normal limits.  She has no evidence of airway compromise or respiratory distress.  Abdominal exam is benign.  She does not have any gross motor, neurologic or vascular deficits on exam.  Pulses are equal bilaterally.  NIH stroke scale score is 0 at this time.      tPA/TNK is not indicated given last known  well time of 10 days ago and her current NIH stroke scale score of 0.      EKG with normal sinus rhythm at 61 bpm, normal axis, normal voltage, normal ST segment, normal T waves      Diagnostic labs without significant abnormality      Initial troponin 5.  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 An acute on  chronic event is not excluded. Recommend correlation with neurologic  symptoms and stat MRI brain for further evaluation. No definitive  intracranial hemorrhage is seen, although limited by diffuse  heterogeneity in the right parieto-occipital parenchyma.    XR chest 1 view   Final Result   No radiographic evidence of acute cardiopulmonary disease.   Signed by Rosendo Slaughter      CT head wo IV contrast   Final Result   No evidence of acute cortical infarct or intracranial hemorrhage.        Stable chronic changes.        MACRO:   None        Signed by: Richmond Weiner 5/22/2025 11:40 AM   Dictation workstation:   [id number]      MR brain wo IV contrast    (Results Pending)   MR angio head wo IV contrast    (Results Pending)   MR angio neck wo IV contrast    (Results Pending)            The patient does not have any gross motor, neurologic or vascular deficits on exam.  No visible or palpable bony deformities.  NIH stroke scale score of 0 at this time.  The patient also takes Eliquis daily.  NIH stroke scale score of 0 and the last known well time of 10 days ago makes tPA/TNK not indicated.  The CT head does not show any evidence of acute or subacute infarct on today's imaging per the radiology reading.  The CT results from yesterday were reviewed and did have a report of an appearance of a mixed acuity infarct in the right parietal occipital region with subacute and chronic appearance.  The case  was discussed with neurology, Dr. Chicas, and she does agree that tPA/TNK is not indicated but did recommend admission for further management.  She will consult on the patient.  The hospitalist did accept the patient for admission        Impression/diagnosis:  1.  Ataxia  2.  Hypertension, unspecified      Critical care time of   31 minutes billed for assessment of the patient with the NIH stroke scale, consideration of tPA inclusion and exclusion criteria, consultation with the patient and her family regarding results, consultation with the referring provider, consultation with neurology, consultation with the accepting provider and monitoring of the patient on telemetry.  This time excludes time for billable procedures.      critical care time billed for by me is non concurrent with time billed for by JANINA Jaquez      I personally saw the patient and made/approve the management plan and take responsibility for the patient management.      I independently interpreted the following study (S) EKG and diagnostic labs      I personally discussed the patient's management with the patient, the referring provider and her family members      I reviewed the results of the diagnostic labs and diagnostic imaging.  Formal radiology read was completed by the radiologist.      Esther Acosta MD

## 2025-05-22 NOTE — TELEPHONE ENCOUNTER
Message was left this morning that they will be headed to the er at 9:30 this morning.  He said he didn't know if you needed to call ahead or not and to let him know we got the message.    I called Dr. Bush and let her know.  She said to call and let him know we got the message and verify that they are headed to [place] and that she will call ahead and let them know she is coming.    I called back to back with no answer at about 8:45 and left a message that we got the message, we are assuming they are headed to [place] [facility] and that Dr. Bush will call ahead.    I then let Dr. Bush know that I didn't reach them but left the message you would call [facility].

## 2025-05-22 NOTE — ED PROVIDER NOTES
"HPI   Chief Complaint   Patient presents with    Cerebrovascular Accident     Pt had + CT for infarct from outpatient PCP. Pt states symptoms have been going on for 10 days but did not want initial LAI in ER. Sent in for eval and rescan. On thinners for AFIB. Denies neuro deficits at this time       HPI  Patient is a 74-year-old female with history of atrial fibrillation on Eliquis who presents to the ER referred by her primary care provider for evaluation of generalized weakness, dizziness and \"off balance\" sensation for around the past week to 10 days.  Patient did have an outpatient CAT scan performed yesterday which does reveal a infarct and thus was sent in by her primary care provider for further evaluation in the ER.  Patient states that she typically has been walking with a walker and states that she does feel very off balance even when walking with her walker.  She states that her legs feel weak but denies any one-sided weakness.  Denies any headache or vision changes.  No facial droop or slurred speech.  She denies any recent falls or head injury.  No chest pain or shortness of breath.  Otherwise has no acute complaints.      Patient History   Medical History[1]  Surgical History[2]  Family History[3]  Social History[4]    Physical Exam   ED Triage Vitals [05/22/25 1041]   Temperature Heart Rate Respirations BP   36.6 °C (97.8 °F) 60 18 (!) 158/93      Pulse Ox Temp Source Heart Rate Source Patient Position   100 % Oral Monitor --      BP Location FiO2 (%)     -- --       Physical Exam  Vitals and nursing note reviewed.   Constitutional:       General: She is not in acute distress.     Appearance: She is well-developed.   HENT:      Head: Normocephalic and atraumatic.   Eyes:      Conjunctiva/sclera: Conjunctivae normal.   Cardiovascular:      Rate and Rhythm: Normal rate and regular rhythm.      Heart sounds: No murmur heard.  Pulmonary:      Effort: Pulmonary effort is normal. No respiratory distress.     "  Breath sounds: Normal breath sounds.   Abdominal:      Palpations: Abdomen is soft.      Tenderness: There is no abdominal tenderness.   Musculoskeletal:         General: No swelling.      Cervical back: Neck supple.   Skin:     General: Skin is warm and dry.      Capillary Refill: Capillary refill takes less than 2 seconds.   Neurological:      Mental Status: She is alert.      Comments: Alert and oriented to person place and time, symmetric strength of bilateral upper and lower extremities, no facial droop or slurred speech, no vision or gaze deficit, no sensation deficit, NIH stroke scale score of 0   Psychiatric:         Mood and Affect: Mood normal.           ED Course & MDM   Diagnoses as of 05/22/25 1242   Ataxia   Hypertension, unspecified type                 No data recorded     [place] Coma Scale Score: 15 (05/22/25 1044 : Lolis Joe RN)       NIH Stroke Scale: 0 (05/22/25 1043 : Lolis Joe RN)                   Medical Decision Making  Parts of this chart have been completed using voice recognition software. Please excuse any errors of transcription.  My thought process and reason for plan has been formulated from the time that I saw the patient until the time of disposition and is not specific to one specific moment during their visit and furthermore my MDM encompasses this entire chart and not only this text box.      HPI: Detailed above.    Exam: A medically appropriate exam performed, outlined above, given the known history and presentation.    History obtained from: Patient, chart review    EKG: Interpreted by attending physician and reviewed by me    Social Determinants of Health considered during this visit: Lives independently with family    Medications given during visit:  Medications - No data to display     Diagnostic/tests  Labs Reviewed   CBC WITH AUTO DIFFERENTIAL - Abnormal       Result Value    WBC 6.7      nRBC 0.0      RBC 4.55      Hemoglobin 13.5      Hematocrit 40.8      MCV  90      MCH 29.7      MCHC 33.1      RDW 13.1      Platelets 184      Neutrophils % 49.4      Immature Granulocytes %, Automated 0.1      Lymphocytes % 33.3      Monocytes % 12.2      Eosinophils % 4.0      Basophils % 1.0      Neutrophils Absolute 3.30      Immature Granulocytes Absolute, Automated 0.01      Lymphocytes Absolute 2.23      Monocytes Absolute 0.82 (*)     Eosinophils Absolute 0.27      Basophils Absolute 0.07     COMPREHENSIVE METABOLIC PANEL - Abnormal    Glucose 161 (*)     Sodium 139      Potassium 3.5      Chloride 106      Bicarbonate 25      Anion Gap 12      Urea Nitrogen 14      Creatinine 0.73      eGFR 86      Calcium 9.2      Albumin 3.9      Alkaline Phosphatase 58      Total Protein 6.6      AST 15      Bilirubin, Total 0.6      ALT 17     MAGNESIUM - Normal    Magnesium 1.93     PROTIME-INR - Normal    Protime 11.9      INR 1.1      Narrative:     INR Therapeutic Range: 2.0-3.5   APTT - Normal    aPTT 27.8     TROPONIN I, HIGH SENSITIVITY - Normal    Troponin I, High Sensitivity 5      Narrative:     Less than 99th percentile of normal range cutoff-  Female and children under 18 years old <14 ng/L; Male <21 ng/L: Negative  Repeat testing should be performed if clinically indicated.     Female and children under 18 years old 14-50 ng/L; Male 21-50 ng/L:  Consistent with possible cardiac damage and possible increased clinical   risk. Serial measurements may help to assess extent of myocardial damage.     >50 ng/L: Consistent with cardiac damage, increased clinical risk and  myocardial infarction. Serial measurements may help assess extent of   myocardial damage.      NOTE: Children less than 1 year old may have higher baseline troponin   levels and results should be interpreted in conjunction with the overall   clinical context.     NOTE: Troponin I testing is performed using a different   testing methodology at [facility] than at other   [facility]. Direct result  comparisons should only   be made within the same method.      CT head wo IV contrast   Final Result   No evidence of acute cortical infarct or intracranial hemorrhage.        Stable chronic changes.        MACRO:   None        Signed by: Richmond Weiner 5/22/2025 11:40 AM   Dictation workstation:   [id number]      XR chest 1 view    (Results Pending)        Considerations/further MDM:  Patient is a 74-year-old female presenting for evaluation of CVA, dizziness    I saw this patient in conjunction with Dr. Acosta    Patient arrives awake alert nontoxic-appearing.  Her vital signs are significant for intermittent bradycardia and hypotension otherwise within normal limits during the visit.  The patient has no focal neurologic deficits on exam her NIH stroke scale score is 0.  Laboratory workup unremarkable EKG nonischemic initial cardiac enzymes negative.  CT head is performed without evidence of infarct or intracranial hemorrhage but when reviewing the CT performed yesterday there was appearance of mixed acuity infarct in the parietal occipital region with subacute and chronic appearance.  The discrepancies were discussed with the neurology team who advises the patient be admitted for further evaluation.  Patient was admitted to hospitalist service for further evaluation for possible CVA.  Patient and family updated on plan of care and agreeable.      Procedure  Procedures       [1]   Past Medical History:  Diagnosis Date    History of left knee replacement 01/30/2024    dr Jefferson    Influenza 04/28/2023    PE (pulmonary thromboembolism) (Multi) 02/13/2023    Personal history of other diseases of the circulatory system     Personal history of cardiac murmur    Personal history of other diseases of the respiratory system 06/05/2018    History of bronchitis    Personal history of other endocrine, nutritional and metabolic disease 08/22/2016    History of obesity   [2]   Past Surgical History:  Procedure Laterality Date     TOTAL KNEE ARTHROPLASTY  01/30/2024    TOTAL KNEE ARTHROPLASTY Right     6/2024 -DR Silvio Escalante   [3]   Family History  Problem Relation Name Age of Onset    Breast cancer Mother      Skin cancer Father     [4]   Social History  Tobacco Use    Smoking status: Never    Smokeless tobacco: Never   Substance Use Topics    Alcohol use: Not on file    Drug use: Not on file        Ana Rosa Jaquez PA-C  05/22/25 1241

## 2025-05-22 NOTE — PROGRESS NOTES
Physical Therapy Evaluation    Patient Name: Cassidy Osorio  MRN: [id number]  Department: [address]  Room: [address] [place], [place] [address]  Today's Date: 5/22/2025   Time Calculation  Start Time: 1410  Stop Time: 1429  Time Calculation (min): 19 min    Assessment/Plan   PT Assessment  PT Assessment Results: Decreased strength, Decreased range of motion, Decreased endurance, Impaired balance, Decreased mobility, Decreased coordination, Decreased safety awareness, Impaired tone  Rehab Prognosis: Good  Barriers to Discharge Home: Caregiver assistance, Physical needs  Caregiver Assistance: Caregiver assistance needed per identified barriers - however, level of patient's required assistance exceeds assistance available at home  Physical Needs: 24hr mobility assistance needed, 24hr ADL assistance needed, High falls risk due to function or environment  Evaluation/Treatment Tolerance: Patient tolerated treatment well  Medical Staff Made Aware: Yes  Strengths: Support of Caregivers, Premorbid level of function  Barriers to Participation: Comorbidities  End of Session Communication: Bedside nurse  Assessment Comment: 74 year old female admits with ataxia, HTN, and r/o CVA. Hx of Afib. CT 5/21 shows mixed acuity infarct R parietal-occipital region. On eval, she demonstrates LLE weakness, LUE weakness, UE coordination deficits, and ataxic gait pattern warranting skilled PT care. As Pt was ind at baseline, High Intensity Rehab is recommended upon DC.  End of Session Patient Position: Bed, 3 rail up, Alarm on  IP OR SWING BED PT PLAN  Inpatient or Swing Bed: Inpatient  PT Plan  Treatment/Interventions: Bed mobility, Transfer training, Gait training, Stair training, Balance training, Strengthening, Endurance training, Therapeutic activity, Therapeutic exercise, Home exercise program, Neuromuscular re-education, Positioning, Postural re-education  PT Plan: Ongoing PT  PT Frequency: 5 times per week  PT Discharge Recommendations: High intensity level of  continued care  Equipment Recommended upon Discharge: Wheeled walker  PT Recommended Transfer Status: Assist x2, Assistive device  PT - OK to Discharge: Yes    Subjective     PT Visit Info:  PT Received On: 05/22/25  General Visit Information:  General  Reason for Referral: Impaired Mobility-Ataxia  Referred By: Dr. Alcala  Past Medical History Relevant to Rehab: HTN, Afib  Family/Caregiver Present: Yes  Caregiver Feedback: spouse+2 visitors  Co-Treatment: OT  Co-Treatment Reason: Safe Mobility  Prior to Session Communication: Bedside nurse  Patient Position Received: Bed, 3 rail up, Alarm on  Preferred Learning Style: visual, verbal  General Comment: 74 year old female admits with ataxia, HTN, and r/o CVA. Hx of Afib. CT 5/21 shows mixed acuity infarct R parietal-occipital region.  Home Living:  Home Living  Type of Home: House  Lives With: Spouse  Home Adaptive Equipment: Walker rolling or standard  Home Layout: Multi-level, Able to live on main level with bedroom/bathroom  Home Access: Stairs to enter with rails  Entrance Stairs-Rails: Both  Entrance Stairs-Number of Steps: 5  Bathroom Shower/Tub: Tub/shower unit  Bathroom Equipment: Grab bars in shower, Shower chair with back  Prior Level of Function:  Prior Function Per Pt/Caregiver Report  Level of [place]: Independent with ADLs and functional transfers, Independent with homemaking with ambulation  Receives Help From: Family  ADL Assistance: Independent  Homemaking Assistance: Independent  Ambulatory Assistance: Independent (FWW, no fall hx)  Precautions:  Precautions  Hearing/Visual Limitations: +glasses  Medical Precautions: Fall precautions      Date/Time Vitals Session Patient Position Pulse Resp SpO2 BP MAP (mmHg)    05/22/25 1337 --  --  50  18  98 %  159/75  103     05/22/25 1341 --  --  50  18  98 %  159/75  103                 Objective   Pain:  Pain Assessment  Pain Assessment: 0-10  0-10 (Numeric) Pain Score: 0 - No  pain  Cognition:  Cognition  Overall Cognitive Status: Within Functional Limits  Orientation Level: Oriented X4    General Assessments:     Activity Tolerance  Endurance: Decreased tolerance for upright activites    Sensation  Sensation Comment: denies abn BUEs, BLEs, and face    Strength  Strength Comments: 4+/5 RLE with some appreciated weakness, 4-/5 LLE and clearly struggles to initiate her movements stating she cant get her LLE do what she wants it to do       Coordination  Movements are Fluid and Coordinated: No  Lower Body Coordination: Ataxia during gait  Finger to Nose: Impaired (2/10 LUE, 9/10 correct RUE)  Coordination Comment: Struggles with B hand finger aproximation, however is able to complete R hand while she is not able to complete L hand at all. Increased time needed     Static Sitting Balance  Static Sitting-Balance Support: Feet supported, Bilateral upper extremity supported  Static Sitting-Level of Assistance: Contact guard  Static Sitting-Comment/Number of Minutes: sitting at EOB     Functional Assessments:  Bed Mobility  Bed Mobility: Yes  Bed Mobility 1  Bed Mobility 1: Supine to sitting  Level of Assistance 1: Close supervision  Bed Mobility Comments 1: CS for safety  Bed Mobility 2  Bed Mobility  2: Sitting to supine  Level of Assistance 2: Moderate assistance, +2  Bed Mobility Comments 2: assist for trunk and BLEs back into bed. Struggles to coordinate and initiate her movements    Transfers  Transfer: Yes  Transfer 1  Transfer From 1: Bed to  Transfer to 1: Stand  Technique 1: Sit to stand  Transfer Device 1: Walker  Transfer Level of Assistance 1: Minimum assistance  Trials/Comments 1: Does fairly well sit to static stand only but pulls from walker. Cues for hand placement. Assist to steady  Transfers 2  Transfer From 2: Stand to  Transfer to 2: Bed  Technique 2: Stand to sit  Transfer Device 2: Walker  Transfer Level of Assistance 2: Minimum assistance, +2  Trials/Comments 2: assist to  "control descent, cues to allign and reach back    Ambulation/Gait Training  Ambulation/Gait Training Performed: Yes  Ambulation/Gait Training 1  Surface 1: Level tile  Device 1: Rolling walker  Assistance 1: Moderate assistance (x2)  Comments/Distance (ft) 1: 10' with 2 turns: very ataxic pattern struggling to initiate and place feet where she wants them. This causes her to frequently become \"off-centered\" with her walke with her body in one direction and the walker towards the other. Assist x2 to steady at all times. Highly unstable with significant fall risk  Extremity/Trunk Assessments:  ROSA LEE :  (Defer to OT)  DAHIANA TALBOT:  (Defer to OT)  Outcome Measures:  [facility] Basic Mobility  Turning from your back to your side while in a flat bed without using bedrails: A little  Moving from lying on your back to sitting on the side of a flat bed without using bedrails: A little  Moving to and from bed to chair (including a wheelchair): A lot  Standing up from a chair using your arms (e.g. wheelchair or bedside chair): A little  To walk in hospital room: Total  Climbing 3-5 steps with railing: Total  Basic Mobility - Total Score: 13    Encounter Problems       Encounter Problems (Active)       Balance       Sitting Balance (Progressing)       Start:  05/22/25    Expected End:  06/05/25       Pt will demonstrate good sitting balance to promote safe engagement with out of bed activities           Standing Balance (Progressing)       Start:  05/22/25    Expected End:  06/05/25       Pt will demonstrate good static standing balance to promote safe participation with out of bed activity, transfers, and mobility              Mobility       Ambulation (Progressing)       Start:  05/22/25    Expected End:  06/05/25       Pt will ambulate 50' modified independent assist with walker to promote safe home mobility           Entry Stair Negotiation (Progressing)       Start:  05/22/25    Expected End:  06/05/25       Pt will " ascend/descend 5 stairs with rail(s) on B and modified independent assist to promote safe entry and exit in home environment                PT Transfers       Supine to sit (Progressing)       Start:  05/22/25    Expected End:  06/05/25       Pt will transfer supine to sitting at edge of bed with modified independent assist to promote acute care out of bed activity           Sit to stand (Progressing)       Start:  05/22/25    Expected End:  06/05/25       Pt will transfer sit to standing position with modified independent assist and walker to promote safe out of bed activity           Bed to chair (Progressing)       Start:  05/22/25    Expected End:  06/05/25       Pt will transfer from sitting edge of bed to the chair with modified independent assist and walker to promote out of bed activity and reduce the risks of prolonged acute care bedrest              Pain - Adult          Safety       Safe Mobility Techniques (Progressing)       Start:  05/22/25    Expected End:  06/05/25       Pt will correctly identify and demonstrate safe mobility techniques to reduce their risks for falls during their acute care stay                   Education Documentation  Mobility Training, taught by Williams Shaw, PT at 5/22/2025  2:49 PM.  Learner: Significant Other, Family, Patient  Readiness: Acceptance  Method: Explanation  Response: Verbalizes Understanding  Comment: safe mobility techniques, PT purpose/POC    Education Comments  No comments found.

## 2025-05-23 ENCOUNTER — APPOINTMENT (OUTPATIENT)
Dept: RADIOLOGY | Facility: HOSPITAL | Age: 75
End: 2025-05-23
Payer: COMMERCIAL

## 2025-05-23 PROBLEM — F39 MOOD DISORDER: Status: ACTIVE | Noted: 2025-05-23

## 2025-05-23 LAB
EST. AVERAGE GLUCOSE BLD GHB EST-MCNC: 134 MG/DL
GLUCOSE BLD MANUAL STRIP-MCNC: 135 MG/DL (ref 74–99)
GLUCOSE BLD MANUAL STRIP-MCNC: 152 MG/DL (ref 74–99)
GLUCOSE BLD MANUAL STRIP-MCNC: 191 MG/DL (ref 74–99)
GLUCOSE BLD MANUAL STRIP-MCNC: 228 MG/DL (ref 74–99)
GLUCOSE BLD MANUAL STRIP-MCNC: 297 MG/DL (ref 74–99)
GLUCOSE BLD MANUAL STRIP-MCNC: 307 MG/DL (ref 74–99)
HBA1C MFR BLD: 6.3 % (ref ?–5.7)

## 2025-05-23 PROCEDURE — 2500000001 HC RX 250 WO HCPCS SELF ADMINISTERED DRUGS (ALT 637 FOR MEDICARE OP): Performed by: STUDENT IN AN ORGANIZED HEALTH CARE EDUCATION/TRAINING PROGRAM

## 2025-05-23 PROCEDURE — 2500000001 HC RX 250 WO HCPCS SELF ADMINISTERED DRUGS (ALT 637 FOR MEDICARE OP): Performed by: INTERNAL MEDICINE

## 2025-05-23 PROCEDURE — 97530 THERAPEUTIC ACTIVITIES: CPT | Mod: GO

## 2025-05-23 PROCEDURE — 2060000001 HC INTERMEDIATE ICU ROOM DAILY

## 2025-05-23 PROCEDURE — A9575 INJ GADOTERATE MEGLUMI 0.1ML: HCPCS | Mod: JZ | Performed by: STUDENT IN AN ORGANIZED HEALTH CARE EDUCATION/TRAINING PROGRAM

## 2025-05-23 PROCEDURE — 99232 SBSQ HOSP IP/OBS MODERATE 35: CPT | Performed by: STUDENT IN AN ORGANIZED HEALTH CARE EDUCATION/TRAINING PROGRAM

## 2025-05-23 PROCEDURE — 70552 MRI BRAIN STEM W/DYE: CPT

## 2025-05-23 PROCEDURE — 2500000002 HC RX 250 W HCPCS SELF ADMINISTERED DRUGS (ALT 637 FOR MEDICARE OP, ALT 636 FOR OP/ED): Performed by: INTERNAL MEDICINE

## 2025-05-23 PROCEDURE — 2550000001 HC RX 255 CONTRASTS: Mod: JZ | Performed by: STUDENT IN AN ORGANIZED HEALTH CARE EDUCATION/TRAINING PROGRAM

## 2025-05-23 PROCEDURE — 2500000002 HC RX 250 W HCPCS SELF ADMINISTERED DRUGS (ALT 637 FOR MEDICARE OP, ALT 636 FOR OP/ED): Performed by: STUDENT IN AN ORGANIZED HEALTH CARE EDUCATION/TRAINING PROGRAM

## 2025-05-23 PROCEDURE — 97110 THERAPEUTIC EXERCISES: CPT | Mod: GO

## 2025-05-23 PROCEDURE — 82947 ASSAY GLUCOSE BLOOD QUANT: CPT

## 2025-05-23 PROCEDURE — 97110 THERAPEUTIC EXERCISES: CPT | Mod: GP

## 2025-05-23 PROCEDURE — 2500000005 HC RX 250 GENERAL PHARMACY W/O HCPCS: Performed by: STUDENT IN AN ORGANIZED HEALTH CARE EDUCATION/TRAINING PROGRAM

## 2025-05-23 PROCEDURE — 2500000004 HC RX 250 GENERAL PHARMACY W/ HCPCS (ALT 636 FOR OP/ED): Mod: JZ | Performed by: STUDENT IN AN ORGANIZED HEALTH CARE EDUCATION/TRAINING PROGRAM

## 2025-05-23 PROCEDURE — 97116 GAIT TRAINING THERAPY: CPT | Mod: GP

## 2025-05-23 RX ORDER — DEXAMETHASONE 4 MG/1
4 TABLET ORAL EVERY 12 HOURS SCHEDULED
Status: DISCONTINUED | OUTPATIENT
Start: 2025-05-24 | End: 2025-05-24 | Stop reason: HOSPADM

## 2025-05-23 RX ORDER — ACETAMINOPHEN 325 MG/1
650 TABLET ORAL EVERY 6 HOURS PRN
Status: DISCONTINUED | OUTPATIENT
Start: 2025-05-23 | End: 2025-05-24 | Stop reason: HOSPADM

## 2025-05-23 RX ORDER — ESCITALOPRAM OXALATE 10 MG/1
10 TABLET ORAL DAILY
Status: DISCONTINUED | OUTPATIENT
Start: 2025-05-23 | End: 2025-05-24 | Stop reason: HOSPADM

## 2025-05-23 RX ORDER — DEXAMETHASONE 4 MG/1
4 TABLET ORAL EVERY 12 HOURS SCHEDULED
Status: DISCONTINUED | OUTPATIENT
Start: 2025-05-23 | End: 2025-05-23

## 2025-05-23 RX ORDER — DEXAMETHASONE 4 MG/1
4 TABLET ORAL EVERY 12 HOURS SCHEDULED
Status: DISCONTINUED | OUTPATIENT
Start: 2025-05-24 | End: 2025-05-23

## 2025-05-23 RX ORDER — INSULIN LISPRO 100 [IU]/ML
0-10 INJECTION, SOLUTION INTRAVENOUS; SUBCUTANEOUS
Status: DISCONTINUED | OUTPATIENT
Start: 2025-05-23 | End: 2025-05-24 | Stop reason: HOSPADM

## 2025-05-23 RX ORDER — GADOTERATE MEGLUMINE 376.9 MG/ML
15 INJECTION INTRAVENOUS
Status: COMPLETED | OUTPATIENT
Start: 2025-05-23 | End: 2025-05-23

## 2025-05-23 RX ADMIN — LORAZEPAM 1 MG: 2 INJECTION INTRAMUSCULAR; INTRAVENOUS at 11:30

## 2025-05-23 RX ADMIN — GADOTERATE MEGLUMINE 15 ML: 376.9 INJECTION INTRAVENOUS at 12:18

## 2025-05-23 RX ADMIN — APIXABAN 5 MG: 5 TABLET, FILM COATED ORAL at 20:12

## 2025-05-23 RX ADMIN — ACETAMINOPHEN 650 MG: 325 TABLET ORAL at 04:37

## 2025-05-23 RX ADMIN — INSULIN LISPRO 6 UNITS: 100 INJECTION, SOLUTION INTRAVENOUS; SUBCUTANEOUS at 21:57

## 2025-05-23 RX ADMIN — DEXAMETHASONE SODIUM PHOSPHATE 4 MG: 4 INJECTION, SOLUTION INTRAMUSCULAR; INTRAVENOUS at 16:29

## 2025-05-23 RX ADMIN — METOPROLOL TARTRATE 25 MG: 25 TABLET, FILM COATED ORAL at 09:26

## 2025-05-23 RX ADMIN — METOPROLOL TARTRATE 25 MG: 25 TABLET, FILM COATED ORAL at 20:12

## 2025-05-23 RX ADMIN — ESCITALOPRAM OXALATE 10 MG: 10 TABLET ORAL at 09:26

## 2025-05-23 RX ADMIN — ATORVASTATIN CALCIUM 40 MG: 40 TABLET, FILM COATED ORAL at 20:12

## 2025-05-23 RX ADMIN — LIDOCAINE 4% 2 PATCH: 40 PATCH TOPICAL at 06:20

## 2025-05-23 RX ADMIN — APIXABAN 5 MG: 5 TABLET, FILM COATED ORAL at 09:26

## 2025-05-23 RX ADMIN — POTASSIUM CHLORIDE 20 MEQ: 1500 TABLET, EXTENDED RELEASE ORAL at 09:26

## 2025-05-23 ASSESSMENT — COGNITIVE AND FUNCTIONAL STATUS - GENERAL
MOVING TO AND FROM BED TO CHAIR: A LOT
EATING MEALS: A LITTLE
DRESSING REGULAR UPPER BODY CLOTHING: A LITTLE
MOBILITY SCORE: 14
MOVING FROM LYING ON BACK TO SITTING ON SIDE OF FLAT BED WITH BEDRAILS: A LITTLE
TOILETING: A LOT
MOBILITY SCORE: 9
WALKING IN HOSPITAL ROOM: TOTAL
STANDING UP FROM CHAIR USING ARMS: A LOT
CLIMB 3 TO 5 STEPS WITH RAILING: A LOT
WALKING IN HOSPITAL ROOM: A LOT
MOVING FROM LYING ON BACK TO SITTING ON SIDE OF FLAT BED WITH BEDRAILS: A LOT
DAILY ACTIVITIY SCORE: 14
MOVING TO AND FROM BED TO CHAIR: TOTAL
PERSONAL GROOMING: A LITTLE
TURNING FROM BACK TO SIDE WHILE IN FLAT BAD: A LITTLE
TURNING FROM BACK TO SIDE WHILE IN FLAT BAD: A LOT
PERSONAL GROOMING: A LITTLE
DRESSING REGULAR LOWER BODY CLOTHING: A LITTLE
DRESSING REGULAR LOWER BODY CLOTHING: A LOT
STANDING UP FROM CHAIR USING ARMS: A LOT
TOILETING: A LOT
HELP NEEDED FOR BATHING: A LOT
DAILY ACTIVITIY SCORE: 18
DRESSING REGULAR UPPER BODY CLOTHING: A LOT
CLIMB 3 TO 5 STEPS WITH RAILING: TOTAL
HELP NEEDED FOR BATHING: A LITTLE

## 2025-05-23 ASSESSMENT — ENCOUNTER SYMPTOMS
NUMBNESS: 1
WEAKNESS: 1

## 2025-05-23 ASSESSMENT — PAIN SCALES - GENERAL
PAINLEVEL_OUTOF10: 2
PAINLEVEL_OUTOF10: 1
PAINLEVEL_OUTOF10: 0 - NO PAIN
PAINLEVEL_OUTOF10: 0 - NO PAIN
PAINLEVEL_OUTOF10: 5 - MODERATE PAIN
PAINLEVEL_OUTOF10: 0 - NO PAIN
PAINLEVEL_OUTOF10: 0 - NO PAIN

## 2025-05-23 ASSESSMENT — PAIN DESCRIPTION - ORIENTATION: ORIENTATION: LOWER

## 2025-05-23 ASSESSMENT — PAIN - FUNCTIONAL ASSESSMENT
PAIN_FUNCTIONAL_ASSESSMENT: 0-10

## 2025-05-23 ASSESSMENT — PAIN DESCRIPTION - DESCRIPTORS: DESCRIPTORS: ACHING

## 2025-05-23 ASSESSMENT — PAIN DESCRIPTION - LOCATION: LOCATION: BACK

## 2025-05-23 NOTE — PROGRESS NOTES
Physical Therapy Treatment    Patient Name: Cassidy Osorio  MRN: [id number]  Department: [address]  Room: [address] [place], [place] [address]  Today's Date: 5/23/2025  Time Calculation  Start Time: 1524  Stop Time: 1548  Time Calculation (min): 24 min         Assessment/Plan   PT Assessment  Barriers to Discharge Home: Caregiver assistance, Physical needs  Caregiver Assistance: Caregiver assistance needed per identified barriers - however, level of patient's required assistance exceeds assistance available at home  Physical Needs: 24hr mobility assistance needed, 24hr ADL assistance needed, High falls risk due to function or environment  Evaluation/Treatment Tolerance: Patient tolerated treatment well  Medical Staff Made Aware: Yes  End of Session Communication: Bedside nurse, Physician, Care Coordinator  Assessment Comment: Discussed case with RN, MD, and CC--Incresased functional deficits today with now L sided neglect which seems to be starting. Deficits warrant skilled PT care.  End of Session Patient Position: Bed, 3 rail up, Alarm on     PT Plan  Treatment/Interventions: Bed mobility, Transfer training, Gait training, Stair training, Balance training, Strengthening, Endurance training, Therapeutic activity, Therapeutic exercise, Home exercise program, Neuromuscular re-education, Positioning, Postural re-education  PT Plan: Ongoing PT  PT Frequency: 5 times per week  PT Discharge Recommendations: High intensity level of continued care  Equipment Recommended upon Discharge: Wheeled walker  PT Recommended Transfer Status: Assist x2, Assistive device  PT - OK to Discharge: Yes    PT Visit Info:  PT Received On: 05/23/25  Response to Previous Treatment: Patient with no complaints from previous session.     General Visit Information:   General  Family/Caregiver Present: Yes  Caregiver Feedback: spouse+2 other family members present  Prior to Session Communication: Bedside nurse  Patient Position Received: Bed, 3 rail up, Alarm on  Preferred  Learning Style: verbal, visual  General Comment: Agreeable to PT follow up. Recently medicated for MRI so family reports Pt is a bit confused this afternoon    Subjective   Precautions:  Precautions  Hearing/Visual Limitations: +corrective lenses  Medical Precautions: Fall precautions     Date/Time Vitals Session Patient Position Pulse Resp SpO2 BP MAP (mmHg)    05/23/25 1500 --  --  61  18  97 %  156/82  107     05/23/25 1552 --  --  61  18  98 %  156/82  7                 Objective   Pain:  Pain Assessment  Pain Assessment: 0-10  0-10 (Numeric) Pain Score: 0 - No pain  Cognition:  Cognition  Overall Cognitive Status: Impaired  Cognition Comments: Bit more confused needing increased verbal and tactile cues throughout session. Digging through her hospital gown to find her glasses which are on her head.  Insight: Moderate  Processing Speed: Delayed  Coordination:  Coordination Comment: Seems to be developing L sided neglect. Rolls over L hand several times during session and seems to lose track of both LUE/LLE. Struggles to place L hand on walker (and find where the L side of the walker is)  Postural Control:  Static Sitting Balance  Static Sitting-Balance Support: Feet supported, Bilateral upper extremity supported  Static Sitting-Level of Assistance: Minimum assistance  Static Sitting-Comment/Number of Minutes: to correct posterior lean--cues to assist  Dynamic Sitting Balance  Dynamic Sitting-Balance Support: Bilateral upper extremity supported, Feet supported  Dynamic Sitting-Level of Assistance: Moderate assistance  Dynamic Sitting-Comments: Completing sitting exercise has significant  posterior lean needing assist to correct    Activity Tolerance:  Activity Tolerance  Endurance: Decreased tolerance for upright activites  Treatments:       Bed Mobility  Bed Mobility: Yes  Bed Mobility 1  Bed Mobility 1: Supine to sitting  Level of Assistance 1: Moderate assistance  Bed Mobility Comments 1: assist for LLE OOB,  trunk up, and cues on technique  Bed Mobility 2  Bed Mobility  2: Sitting to supine  Level of Assistance 2: Maximum assistance  Bed Mobility Comments 2: assist for trunk and BLEs. Cues on technique  Bed Mobility 3  Bed Mobility 3: Scooting  Level of Assistance 3: Dependent, +2  Bed Mobility Comments 3: with green draw sheet to reposition  Bed Mobility 4  Bed Mobility 4: Rolling right, Rolling left  Level of Assistance 4: Maximum assistance  Bed Mobility Comments 4: for full ROM-Cues for rail use    Ambulation/Gait Training  Ambulation/Gait Training Performed: Yes  Ambulation/Gait Training 1  Surface 1: Level tile  Device 1: Rolling walker  Assistance 1: Maximum assistance  Comments/Distance (ft) 1: 3' x4 sidestepping along EOB--fatigued very quickly needing rest breaks in between. Struggles to lift and advance LLE needing assist from PT to 1.) weight shift 2.) manually advance LLE. Very ataxic and unsteady throughout  Transfers  Transfer: Yes  Transfer 1  Transfer From 1: Bed to  Transfer to 1: Stand  Technique 1: Sit to stand, Stand to sit  Transfer Device 1: Walker  Transfer Level of Assistance 1: Moderate assistance  Trials/Comments 1: x4 trials--assist for uprighting. Cues on BUE pushoff. Struggles to find L side of walker today and needs help placing L hand on FWW    Outcome Measures:  [facility] Basic Mobility  Turning from your back to your side while in a flat bed without using bedrails: A lot  Moving from lying on your back to sitting on the side of a flat bed without using bedrails: A lot  Moving to and from bed to chair (including a wheelchair): Total  Standing up from a chair using your arms (e.g. wheelchair or bedside chair): A lot  To walk in hospital room: Total  Climbing 3-5 steps with railing: Total  Basic Mobility - Total Score: 9    Education Documentation  Home Exercise Program, taught by Williams Shaw, PT at 5/23/2025  4:10 PM.  Learner: Patient  Readiness: Acceptance  Method: Explanation,  Demonstration  Response: Needs Reinforcement  Comment: safe mobility techniques, HEP    Mobility Training, taught by Williams Shaw, PT at 5/23/2025  4:10 PM.  Learner: Patient  Readiness: Acceptance  Method: Explanation, Demonstration  Response: Needs Reinforcement  Comment: safe mobility techniques, HEP    Education Comments  No comments found.        OP EDUCATION:       Encounter Problems       Encounter Problems (Active)       Balance       Sitting Balance (Progressing)       Start:  05/22/25    Expected End:  06/05/25       Pt will demonstrate good sitting balance to promote safe engagement with out of bed activities           Standing Balance (Progressing)       Start:  05/22/25    Expected End:  06/05/25       Pt will demonstrate good static standing balance to promote safe participation with out of bed activity, transfers, and mobility              Mobility       Ambulation (Progressing)       Start:  05/22/25    Expected End:  06/05/25       Pt will ambulate 50' modified independent assist with walker to promote safe home mobility           Entry Stair Negotiation (Progressing)       Start:  05/22/25    Expected End:  06/05/25       Pt will ascend/descend 5 stairs with rail(s) on B and modified independent assist to promote safe entry and exit in home environment                PT Transfers       Supine to sit (Progressing)       Start:  05/22/25    Expected End:  06/05/25       Pt will transfer supine to sitting at edge of bed with modified independent assist to promote acute care out of bed activity           Sit to stand (Progressing)       Start:  05/22/25    Expected End:  06/05/25       Pt will transfer sit to standing position with modified independent assist and walker to promote safe out of bed activity           Bed to chair (Progressing)       Start:  05/22/25    Expected End:  06/05/25       Pt will transfer from sitting edge of bed to the chair with modified independent assist and walker to  promote out of bed activity and reduce the risks of prolonged acute care bedrest              Pain - Adult          Safety       Safe Mobility Techniques (Progressing)       Start:  05/22/25    Expected End:  06/05/25       Pt will correctly identify and demonstrate safe mobility techniques to reduce their risks for falls during their acute care stay

## 2025-05-23 NOTE — CARE PLAN
The patient's goals for the shift include      The clinical goals for the shift include safety      Problem: Diabetes  Goal: Achieve decreasing blood glucose levels by end of shift  Outcome: Progressing  Goal: Increase stability of blood glucose readings by end of shift  Outcome: Progressing  Goal: Maintain electrolyte levels within acceptable range throughout shift  Outcome: Progressing  Goal: Learn about and adhere to nutrition recommendations by end of shift  Outcome: Progressing  Goal: Vital signs within normal range for age by end of shift  Outcome: Progressing  Goal: Receive DSME education by end of shift  Outcome: Progressing     Problem: Pain - Adult  Goal: Verbalizes/displays adequate comfort level or baseline comfort level  Outcome: Progressing     Problem: Safety - Adult  Goal: Free from fall injury  Outcome: Progressing     Problem: Discharge Planning  Goal: Discharge to home or other facility with appropriate resources  Outcome: Progressing     Problem: Chronic Conditions and Co-morbidities  Goal: Patient's chronic conditions and co-morbidity symptoms are monitored and maintained or improved  Outcome: Progressing     Problem: Nutrition  Goal: Nutrient intake appropriate for maintaining nutritional needs  Outcome: Progressing     Problem: General Stroke  Goal: Establish a mutual long term goal with patient by discharge  Outcome: Progressing  Goal: Demonstrate improvement in neurological exam throughout the shift  Outcome: Progressing     Problem: Diabetes  Goal: Maintain glucose levels >70mg/dl to <250mg/dl throughout shift  Outcome: Met  Goal: No changes in neurological exam by end of shift  Outcome: Met  Goal: Increase self care and/or family involovement by end of shift  Outcome: Met     Problem: General Stroke  Goal: Maintain BP within ordered limits throughout shift  Outcome: Met  Goal: Participate in treatment (ie., meds, therapy) throughout shift  Outcome: Met  Goal: No symptoms of aspiration  throughout shift  Outcome: Met  Goal: No symptoms of hemorrhage throughout shift  Outcome: Met  Goal: Decreased nausea/vomiting throughout shift  Outcome: Met  Goal: Controlled blood glucose throughout shift  Outcome: Met  Goal: Out of bed three times today  Outcome: Met

## 2025-05-23 NOTE — CONSULTS
"Consults    History Of Present Illness  Cassidy Osorio is a 74 y.o. female with history of PE, A-fib on Eliquis presenting with concerns for bilateral lower extremity weakness and left upper extremity reduced sensation.  Patient is awake alert and oriented she is sitting in a chair at bedside with her  and daughter.  They are aware of the concern for metastatic brain lesion and she began sobbing heavily.  The family denied further questions and she was unable to further with the assessment or conversation.  Past Medical History  Medical History[1]  Surgical History  Surgical History[2]  Social History  Social History[3]  Allergies  Patient has no known allergies.  Prescriptions Prior to Admission[4]    Review of Systems   Neurological:  Positive for weakness and numbness.       Neurological Exam  Exam is limited as patient became extremely distraught in the discussion of the concern for metastatic brain lesion.  She is awake and moves all extremities.  Physical Exam  Cardiovascular:      Rate and Rhythm: Normal rate.   Pulmonary:      Effort: Pulmonary effort is normal.         Last Recorded Vitals  Blood pressure 156/83, pulse 73, temperature 37 °C (98.6 °F), temperature source Temporal, resp. rate 18, height 1.676 m (5' 5.98\"), weight 74.6 kg (164 lb 7.4 oz), SpO2 96%.    Relevant Results        NIH Stroke Scale  1A. Level of Consciousness: Alert, Keenly Responsive  1B. Ask Month and Age: Both Questions Right  1C. Blink Eyes & Squeeze Hands: Performs Both Tasks  2. Best Gaze: Normal  3. Visual: No Visual Loss  4. Facial Palsy: Normal Symmetrical Movements  5A. Motor - Left Arm: No Drift  5B. Motor - Right Arm: No Drift  6A. Motor - Left Leg: No Drift  6B. Motor - Right Leg: No Drift  7. Limb Ataxia: Absent  8. Sensory Loss: Normal  9. Best Language: No Aphasia  10. Dysarthria: Normal  11. Extinction and Inattention: No Abnormality  NIH Stroke Scale: 0           [place] Coma Scale  Best Eye Response: " Spontaneous  Best Verbal Response: Oriented  Best Motor Response: Follows commands  [place] Coma Scale Score: 15                 I have personally reviewed the following imaging results:   Imaging  MR brain wo IV contrast  Result Date: 5/22/2025  MRI BRAIN - Confluent FLAIR signal increase is seen involving the left temporoparietal region. There is serpentine abnormally restricted diffusion in the same area. There is no associated susceptibility effect. There is also some serpentine T1 shortening. The appearance is somewhat atypical for a recent or evolving infarction, though that is not entirely excluded. Primary CNS neoplasm is a strong differential consideration and CNS infection a more remote consideration. Recommend patient return for contrast-enhanced imaging.   A roughly nodular focus of slight FLAIR signal increase in the high parasagittal right cerebral hemisphere which is at the superior margin of the aforementioned confluent FLAIR signal increase involving the right parietotemporal region. This measures 20 x 18 mm greatest axial dimensions. This may represent a separate process such as a meningioma with localized adjacent brain parenchymal edema.   MRA HEAD - 1. No evidence of large arterial occlusion or evident aneurysm.   MRA NECK - 1. No evidence of occlusion or severe stenosis in the bilateral carotid and vertebral arterial systems. Note artifactual limitations of the MRA of the neck as described above.   MACRO: None   Signed by: Benjamin Lovett 5/22/2025 6:42 PM Dictation workstation:   [id number]    MR angio head wo IV contrast  Result Date: 5/22/2025  MRI BRAIN - Confluent FLAIR signal increase is seen involving the left temporoparietal region. There is serpentine abnormally restricted diffusion in the same area. There is no associated susceptibility effect. There is also some serpentine T1 shortening. The appearance is somewhat atypical for a recent or evolving infarction, though that is not  entirely excluded. Primary CNS neoplasm is a strong differential consideration and CNS infection a more remote consideration. Recommend patient return for contrast-enhanced imaging.   A roughly nodular focus of slight FLAIR signal increase in the high parasagittal right cerebral hemisphere which is at the superior margin of the aforementioned confluent FLAIR signal increase involving the right parietotemporal region. This measures 20 x 18 mm greatest axial dimensions. This may represent a separate process such as a meningioma with localized adjacent brain parenchymal edema.   MRA HEAD - 1. No evidence of large arterial occlusion or evident aneurysm.   MRA NECK - 1. No evidence of occlusion or severe stenosis in the bilateral carotid and vertebral arterial systems. Note artifactual limitations of the MRA of the neck as described above.   MACRO: None   Signed by: Benjamin Lovett 5/22/2025 6:42 PM Dictation workstation:   [id number]    MR angio neck wo IV contrast  Result Date: 5/22/2025  MRI BRAIN - Confluent FLAIR signal increase is seen involving the left temporoparietal region. There is serpentine abnormally restricted diffusion in the same area. There is no associated susceptibility effect. There is also some serpentine T1 shortening. The appearance is somewhat atypical for a recent or evolving infarction, though that is not entirely excluded. Primary CNS neoplasm is a strong differential consideration and CNS infection a more remote consideration. Recommend patient return for contrast-enhanced imaging.   A roughly nodular focus of slight FLAIR signal increase in the high parasagittal right cerebral hemisphere which is at the superior margin of the aforementioned confluent FLAIR signal increase involving the right parietotemporal region. This measures 20 x 18 mm greatest axial dimensions. This may represent a separate process such as a meningioma with localized adjacent brain parenchymal edema.   MRA HEAD - 1. No  evidence of large arterial occlusion or evident aneurysm.   MRA NECK - 1. No evidence of occlusion or severe stenosis in the bilateral carotid and vertebral arterial systems. Note artifactual limitations of the MRA of the neck as described above.   MACRO: None   Signed by: Benjamin Lovett 5/22/2025 6:42 PM Dictation workstation:   [id number]    CT lumbar spine wo IV contrast  Result Date: 5/22/2025  No acute findings of the lumbar spine. Mild multilevel spondylosis as above.   MACRO: None   Signed by: Nitesh Zelaya 5/22/2025 4:18 PM Dictation workstation:   [id number]    XR chest 1 view  Result Date: 5/22/2025  No radiographic evidence of acute cardiopulmonary disease. Signed by Rosendo Slaughter    CT head wo IV contrast  Result Date: 5/22/2025  No evidence of acute cortical infarct or intracranial hemorrhage.   Stable chronic changes.   MACRO: None   Signed by: Richmond Weiner 5/22/2025 11:40 AM Dictation workstation:   [id number]    CT head w and wo IV contrast  Addendum Date: 5/21/2025  Interpreted By:  Nitesh Zelaya, ADDENDUM: Findings were discussed with Dr. Jeanne Aguirre at 720 p.m. via telephone communication. This exam was performed as an outpatient, given the size of the infarct and mixed acuity, I recommended patient have further imaging workup in the emergency department/as an inpatient to help elucidate the etiology of the infarct as well as follow-up to exclude any hemorrhagic conversion.   Signed by: Nitesh Zelaya 5/21/2025 7:22 PM   -------- ORIGINAL REPORT -------- Dictation workstation:   [id number]    Result Date: 5/21/2025  Appearance of mixed acuity infarct in the right parieto-occipital region with subacute and chronic appearance as above. An acute on chronic event is not excluded. Recommend correlation with neurologic symptoms and stat MRI brain for further evaluation. No definitive intracranial hemorrhage is seen, although limited by diffuse heterogeneity in the right  parieto-occipital parenchyma.   Signed by: Nitesh Zelaya 5/21/2025 7:01 PM Dictation workstation:   [id number]      Cardiology, Vascular, and Other Imaging  ECG 12 lead  Result Date: 5/22/2025  Normal sinus rhythm Normal ECG No previous ECGs available           Assessment/Plan   Assessment & Plan  Ataxia  74 y.o. female with history of PE, A-fib on Eliquis presenting with concerns for bilateral lower extremity weakness and left upper extremity reduced sensation.  Physical exam is deferred at this time as patient was too distraught to continue.  Continue with MRI with contrast and anticipate a transfer downtown for neurosurgical consultation.  Paroxysmal A-fib (Multi)    Acute midline low back pain    Mood disorder                 Janet De La Torre, APRN-CNP       [1]   Past Medical History:  Diagnosis Date    History of left knee replacement 01/30/2024    dr Escalante    Influenza 04/28/2023    PE (pulmonary thromboembolism) (Multi) 02/13/2023    Personal history of other diseases of the circulatory system     Personal history of cardiac murmur    Personal history of other diseases of the respiratory system 06/05/2018    History of bronchitis    Personal history of other endocrine, nutritional and metabolic disease 08/22/2016    History of obesity   [2]   Past Surgical History:  Procedure Laterality Date    TOTAL KNEE ARTHROPLASTY  01/30/2024    TOTAL KNEE ARTHROPLASTY Right     6/2024 -DR Silvio Escalante   [3]   Social History  Tobacco Use    Smoking status: Never    Smokeless tobacco: Never   Substance Use Topics    Alcohol use: Never    Drug use: Never   [4]   Medications Prior to Admission   Medication Sig Dispense Refill Last Dose/Taking    apixaban (Eliquis) 5 mg tablet Take 1 tablet (5 mg) by mouth 2 times a day. 56 tablet 0 5/22/2025 Morning    hydrOXYzine HCL (Atarax) 10 mg tablet Take 1 tablet (10 mg) by mouth every 6 hours if needed for anxiety. 30 tablet 0 5/22/2025 Morning    metoprolol tartrate (Lopressor)  50 mg tablet TAKE ONE TABLET BY MOUTH TWO TIMES A DAY (Patient taking differently: Take 1 tablet by mouth 2 times a day.) 180 tablet 1 2025    [] potassium chloride CR (Klor-Con M20) 20 mEq ER tablet Take 1 tablet (20 mEq) by mouth once daily. With a meal.   Do not crush or chew. 30 tablet 11 2025 Morning    rosuvastatin (Crestor) 40 mg tablet Take 1 tablet (40 mg) by mouth once daily. 90 tablet 3 2025 Morning    lisinopriL-hydrochlorothiazide 10-12.5 mg tablet Take 1 tablet by mouth once daily. (Patient not taking: Reported on 2025) 90 tablet 3 Not Taking    metFORMIN XR (Glucophage-XR) 500 mg 24 hr tablet Take 4 tablets (2,000 mg) by mouth once daily in the evening. Take with meals. Do not crush, chew, or split. (Patient not taking: Reported on 2025) 360 tablet 3 Not Taking

## 2025-05-23 NOTE — ASSESSMENT & PLAN NOTE
74 y.o. female with history of PE, A-fib on Eliquis presenting with concerns for bilateral lower extremity weakness and left upper extremity reduced sensation.  Physical exam is deferred at this time as patient was too distraught to continue.  Continue with MRI with contrast and anticipate a transfer downtown for neurosurgical consultation.

## 2025-05-23 NOTE — PROGRESS NOTES
Occupational Therapy Treatment    Name: Cassidy Osorio  MRN: [id number]  Department: [address]  Room: [address] [place] [address]  Date: 05/23/25  Time Calculation  Start Time: 1045  Stop Time: 1113  Time Calculation (min): 28 min    Assessment:  OT Assessment: Pt demonstrated progress toward estalibshed goals and was receptive to therapy and instructions throughout.  Pt would benefit from continued OT skilled services d/t potential for further gains.  Prognosis: Good  Barriers to Discharge Home: Caregiver assistance, Physical needs, Cognition needs  Caregiver Assistance: Caregiver assistance needed per identified barriers - however, level of patient's required assistance exceeds assistance available at home  Cognition Needs: Insight of patient limited regarding functional ability/needs  Physical Needs: Stair navigation into home limited by function/safety, Ambulating household distances limited by function/safety, 24hr mobility assistance needed, 24hr ADL assistance needed, High falls risk due to function or environment  Evaluation/Treatment Tolerance: Patient tolerated treatment well  Medical Staff Made Aware: Yes  End of Session Communication: Bedside nurse  End of Session Patient Position: Up in chair, Alarm on    Plan:  Treatment Interventions: ADL retraining, Functional transfer training, UE strengthening/ROM, Patient/family training, Fine motor coordination activities  OT Frequency: 4 times per week  OT Discharge Recommendations: High intensity level of continued care  Equipment Recommended upon Discharge: Wheeled walker  OT Recommended Transfer Status: Assist of 2  OT - OK to Discharge: Yes      Subjective   OT Visit Info:  OT Received On: 05/23/25    General:  General  Reason for Referral: Impaired ADLs, ataxia  Referred By: Dr Alcala  Past Medical History Relevant to Rehab: a-fib, OA, DM, BPPV, obesity, tricuspid valve disease, LTKA, PE, HLD  Family/Caregiver Present: Yes  Caregiver Feedback: multiple including spouse -  supportive  Co-Treatment: PT  Co-Treatment Reason: partial to maximize safety with mobility and outcome  Prior to Session Communication: Bedside nurse  Patient Position Received: Up in bathroom  Preferred Learning Style: verbal, visual  General Comment: Cleared by nursing and agreeable for therapy    Precautions:  Hearing/Visual Limitations: +corrective lenses  Medical Precautions: Fall precautions    Pain Assessment:  Pain Assessment  Pain Assessment: 0-10  0-10 (Numeric) Pain Score: 0 - No pain      Objective   Cognition:  Overall Cognitive Status: Impaired  Orientation Level: Oriented X4  Following Commands: Follows one step commands with repetition  Insight: Mild  Processing Speed: Delayed    Activities of Daily Living: LE Dressing  LE Dressing: Yes  Sock Level of Assistance: Setup  LE Dressing Where Assessed: Chair  LE Dressing Comments: donning left sock with increased time d/t impaired FM coordination    Bed Mobility/Transfers: Transfers  Transfer: Yes  Transfer 1  Transfer From 1: Chair without arms to  Transfer to 1: Stand  Technique 1: Sit to stand  Transfer Device 1: Walker  Transfer Level of Assistance 1: Moderate assistance  Trials/Comments 1: cues for hand placement  Transfers 2  Transfer From 2: Stand to  Transfer to 2: Sit, Chair with arms  Technique 2: Stand to sit  Transfer Device 2: Walker  Transfer Level of Assistance 2: Minimum assistance  Trials/Comments 2: cues for hand placement and to align self safely with seated surface    Functional Mobility:  Functional Mobility  Functional Mobility Performed:  (Mod assist x 2 ~10' with a 2 wheeled walker and cues throughout for safety)    Therapy/Activity: Therapeutic Exercise  Therapeutic Exercise Performed:  (Instructed in/performed LUE AROM exercises x 10 reps each and reaching tasks to increase GM/FM coordination needed for ADLs)    Strength:  Strength  Strength Comments: RUE=~4+/5, LUE=~4-/5    Outcome Measures:  [facility] Daily Activity  Putting on  and taking off regular lower body clothing: A lot  Bathing (including washing, rinsing, drying): A lot  Putting on and taking off regular upper body clothing: A lot  Toileting, which includes using toilet, bedpan or urinal: A lot  Taking care of personal grooming such as brushing teeth: A little  Eating Meals: A little  Daily Activity - Total Score: 14    Education Documentation  Home Exercise Program, taught by Nicolasa Richey OT at 5/23/2025 11:24 AM.  Learner: Family, Patient  Readiness: Acceptance  Method: Explanation  Response: Demonstrated Understanding, Needs Reinforcement  Comment: LUE exercises to increase GM/FM coordination    ADL Training, taught by Nicolasa Richey OT at 5/22/2025  2:53 PM.  Learner: Patient  Readiness: Acceptance  Method: Explanation  Response: Demonstrated Understanding  Comment: Functional transfer/mobility retraining    Goals:  Encounter Problems       Encounter Problems (Active)       OT Goals       ADLs (Progressing)       Start:  05/22/25    Expected End:  06/05/25       Patient will complete ADL tasks, with modified independent using AE need in order to increase patient's safety and independence with self-care tasks.           Functional transfers (Progressing)       Start:  05/22/25    Expected End:  06/05/25       Patient will complete functional transfers with modified independent using least restrictive device, in order to increase patient's safety and independence with daily tasks.           Activity tolerance (Progressing)       Start:  05/22/25    Expected End:  06/05/25       Patient will demonstrate the ability to participate in functional activity at least >/= 25 minutes in order to increase patient's safety and independence with daily tasks.

## 2025-05-23 NOTE — PROGRESS NOTES
05/23/25 1709   Discharge Planning   Living Arrangements Family members;Children   Support Systems Spouse/significant other;Children;Family members   Type of Residence Private residence   Expected Discharge Disposition SNF  ([facility] accepted patient.)   Does the patient need discharge transport arranged? Yes   RoundTrip coordination needed? Yes   Has discharge transport been arranged? No     Per PT Notes: Increased functional deficits today with now L sided neglect which seems to be starting.     Patient is not safe to be discharged to home, patient was requesting to go home.   Dr Satnam Saenz MD, Neurologist ([phone]) referral was placed, called office and the office said they will call the family to set up an appointment.     Dr Alcala and this writer spoke with family regarding discharge plan, encouraged patient/family to stay the night for further observation and therapy. Were in agreement. Also open to skilled rehab and referral was placed to [facility], accepted. No precert is needed.     PLAN: Patient accepted to [facility] and no precert is needed

## 2025-05-23 NOTE — NURSING NOTE
Dr gomes into see pt earlier   neuro  np in up with 2 assist and walker unsteady robert diet   lt side weak

## 2025-05-23 NOTE — PROGRESS NOTES
"Hospitalist Progress Note  Cassidy Osorio is a 74 y.o. female who presented with Cerebrovascular Accident (Pt had + CT for infarct from outpatient PCP. Pt states symptoms have been going on for 10 days but did not want initial LAI in ER. Sent in for eval and rescan. On thinners for AFIB. Denies neuro deficits at this time) and is on day 0 of admission for Ataxia    Subjective    Discussed abnormal MRI. Answered family questions.  reports that she has been having mood disturbance with anxiety and depression since other symptoms began     Objective    Blood pressure 173/86, pulse 59, temperature 36.7 °C (98.1 °F), temperature source Temporal, resp. rate 18, height 1.676 m (5' 5.98\"), weight 74.6 kg (164 lb 7.4 oz), SpO2 99%.  Vitals Reviewed: yes  Constitutional: sitting up in chair  Eyes: EOMI, normal conjunctiva  HENT: moist mucus membranes, airway patent  Pulm: On room air, normal work of breathing   Cardiac: borderline bradycardic rate, regular rhythm  MSK: trace lower extremity edema  Neuro: LE weakness, oriented, CN II-XII grossly intact.   Psych: Cooperative, blunted affect, fair judgement      Intake/Output Summary (Last 24 hours) at 5/23/2025 1030  Last data filed at 5/22/2025 1712  Gross per 24 hour   Intake 240 ml   Output --   Net 240 ml       Relevant Results  MR brain wo IV contrast  Result Date: 5/22/2025  MRI BRAIN - Confluent FLAIR signal increase is seen involving the left temporoparietal region. There is serpentine abnormally restricted diffusion in the same area. There is no associated susceptibility effect. There is also some serpentine T1 shortening. The appearance is somewhat atypical for a recent or evolving infarction, though that is not entirely excluded. Primary CNS neoplasm is a strong differential consideration and CNS infection a more remote consideration. Recommend patient return for contrast-enhanced imaging.   A roughly nodular focus of slight FLAIR signal increase in the high " parasagittal right cerebral hemisphere which is at the superior margin of the aforementioned confluent FLAIR signal increase involving the right parietotemporal region. This measures 20 x 18 mm greatest axial dimensions. This may represent a separate process such as a meningioma with localized adjacent brain parenchymal edema.   MRA HEAD - 1. No evidence of large arterial occlusion or evident aneurysm.   MRA NECK - 1. No evidence of occlusion or severe stenosis in the bilateral carotid and vertebral arterial systems. Note artifactual limitations of the MRA of the neck as described above.   MACRO: None   Signed by: Benjamin Lovett 5/22/2025 6:42 PM Dictation workstation:   [id number]    MR angio head wo IV contrast  Result Date: 5/22/2025  MRI BRAIN - Confluent FLAIR signal increase is seen involving the left temporoparietal region. There is serpentine abnormally restricted diffusion in the same area. There is no associated susceptibility effect. There is also some serpentine T1 shortening. The appearance is somewhat atypical for a recent or evolving infarction, though that is not entirely excluded. Primary CNS neoplasm is a strong differential consideration and CNS infection a more remote consideration. Recommend patient return for contrast-enhanced imaging.   A roughly nodular focus of slight FLAIR signal increase in the high parasagittal right cerebral hemisphere which is at the superior margin of the aforementioned confluent FLAIR signal increase involving the right parietotemporal region. This measures 20 x 18 mm greatest axial dimensions. This may represent a separate process such as a meningioma with localized adjacent brain parenchymal edema.   MRA HEAD - 1. No evidence of large arterial occlusion or evident aneurysm.   MRA NECK - 1. No evidence of occlusion or severe stenosis in the bilateral carotid and vertebral arterial systems. Note artifactual limitations of the MRA of the neck as described above.    MACRO: None   Signed by: Benjamin Lovett 5/22/2025 6:42 PM Dictation workstation:   [id number]    MR angio neck wo IV contrast  Result Date: 5/22/2025  MRI BRAIN - Confluent FLAIR signal increase is seen involving the left temporoparietal region. There is serpentine abnormally restricted diffusion in the same area. There is no associated susceptibility effect. There is also some serpentine T1 shortening. The appearance is somewhat atypical for a recent or evolving infarction, though that is not entirely excluded. Primary CNS neoplasm is a strong differential consideration and CNS infection a more remote consideration. Recommend patient return for contrast-enhanced imaging.   A roughly nodular focus of slight FLAIR signal increase in the high parasagittal right cerebral hemisphere which is at the superior margin of the aforementioned confluent FLAIR signal increase involving the right parietotemporal region. This measures 20 x 18 mm greatest axial dimensions. This may represent a separate process such as a meningioma with localized adjacent brain parenchymal edema.   MRA HEAD - 1. No evidence of large arterial occlusion or evident aneurysm.   MRA NECK - 1. No evidence of occlusion or severe stenosis in the bilateral carotid and vertebral arterial systems. Note artifactual limitations of the MRA of the neck as described above.   MACRO: None   Signed by: Benjamin Lovett 5/22/2025 6:42 PM Dictation workstation:   [id number]    CT lumbar spine wo IV contrast  Result Date: 5/22/2025  No acute findings of the lumbar spine. Mild multilevel spondylosis as above.   MACRO: None   Signed by: Nitesh Zelaya 5/22/2025 4:18 PM Dictation workstation:   [id number]    XR chest 1 view  Result Date: 5/22/2025  No radiographic evidence of acute cardiopulmonary disease. Signed by Rosendo Slaughter    CT head wo IV contrast  Result Date: 5/22/2025  No evidence of acute cortical infarct or intracranial hemorrhage.   Stable chronic  changes.   MACRO: None   Signed by: Richmond Weiner 5/22/2025 11:40 AM Dictation workstation:   [id number]    CT head w and wo IV contrast  Addendum Date: 5/21/2025  Interpreted By:  Nitesh Zelaya, ADDENDUM: Findings were discussed with Dr. Jeanne Aguirre at 720 p.m. via telephone communication. This exam was performed as an outpatient, given the size of the infarct and mixed acuity, I recommended patient have further imaging workup in the emergency department/as an inpatient to help elucidate the etiology of the infarct as well as follow-up to exclude any hemorrhagic conversion.   Signed by: Nitesh Zelaya 5/21/2025 7:22 PM   -------- ORIGINAL REPORT -------- Dictation workstation:   [id number]    Result Date: 5/21/2025  Appearance of mixed acuity infarct in the right parieto-occipital region with subacute and chronic appearance as above. An acute on chronic event is not excluded. Recommend correlation with neurologic symptoms and stat MRI brain for further evaluation. No definitive intracranial hemorrhage is seen, although limited by diffuse heterogeneity in the right parieto-occipital parenchyma.   Signed by: Nitesh Zelaya 5/21/2025 7:01 PM Dictation workstation:   [id number]      Microbiology  No results found for the last 90 days.      Scheduled medications  Scheduled Medications[1]  Continuous medications  Continuous Medications[2]  PRN medications  PRN Medications[3]        Assessment    Assessment & Plan  Ataxia  -with concern outpatient for stroke  -MRI with atypical findings. Discussed with neuro 5/23 who are ordering contrasted scan as advised by radiology   -discussed findings with patient and wife  -await contrasted scan and neurology input  Paroxysmal A-fib (Multi)  -reports episode occurred after she had foot surgery  -continue eliquis  -Continue metop with hold cathy  Acute midline low back pain  -ordered lumbar spine to rule out confounders to ataxia. No sign of  compression  Mood disorder  -discussed starting a medication for mood  -starting lexapro      DVT prophylaxis: Eliquis  Disposition: Anticipate that she may need to flip to inpatient as she is currently obs      Josi Alcala         [1] apixaban, 5 mg, oral, BID  aspirin, 81 mg, oral, Daily   Or  aspirin, 81 mg, oral, Daily   Or  aspirin, 81 mg, nasogastric tube, Daily   Or  aspirin, 300 mg, rectal, Daily  atorvastatin, 40 mg, oral, Nightly  escitalopram, 10 mg, oral, Daily  metoprolol tartrate, 25 mg, oral, BID  potassium chloride CR, 20 mEq, oral, Daily  [2]    [3] PRN medications: acetaminophen, cyclobenzaprine, labetaloL, lidocaine, LORazepam, oxygen

## 2025-05-23 NOTE — ASSESSMENT & PLAN NOTE
-with concern outpatient for stroke  -MRI with atypical findings. Discussed with neuro 5/23 who are ordering contrasted scan as advised by radiology   -discussed findings with patient and wife  -await contrasted scan and neurology input

## 2025-05-24 VITALS
OXYGEN SATURATION: 97 % | RESPIRATION RATE: 20 BRPM | DIASTOLIC BLOOD PRESSURE: 68 MMHG | TEMPERATURE: 97.9 F | HEIGHT: 66 IN | WEIGHT: 164.46 LBS | HEART RATE: 71 BPM | BODY MASS INDEX: 26.43 KG/M2 | SYSTOLIC BLOOD PRESSURE: 132 MMHG

## 2025-05-24 LAB
GLUCOSE BLD MANUAL STRIP-MCNC: 196 MG/DL (ref 74–99)
GLUCOSE BLD MANUAL STRIP-MCNC: 294 MG/DL (ref 74–99)

## 2025-05-24 PROCEDURE — 97110 THERAPEUTIC EXERCISES: CPT | Mod: GP

## 2025-05-24 PROCEDURE — 2500000002 HC RX 250 W HCPCS SELF ADMINISTERED DRUGS (ALT 637 FOR MEDICARE OP, ALT 636 FOR OP/ED): Performed by: INTERNAL MEDICINE

## 2025-05-24 PROCEDURE — 2500000002 HC RX 250 W HCPCS SELF ADMINISTERED DRUGS (ALT 637 FOR MEDICARE OP, ALT 636 FOR OP/ED): Performed by: STUDENT IN AN ORGANIZED HEALTH CARE EDUCATION/TRAINING PROGRAM

## 2025-05-24 PROCEDURE — 97110 THERAPEUTIC EXERCISES: CPT | Mod: GO

## 2025-05-24 PROCEDURE — 99239 HOSP IP/OBS DSCHRG MGMT >30: CPT | Performed by: STUDENT IN AN ORGANIZED HEALTH CARE EDUCATION/TRAINING PROGRAM

## 2025-05-24 PROCEDURE — 82947 ASSAY GLUCOSE BLOOD QUANT: CPT

## 2025-05-24 PROCEDURE — 97530 THERAPEUTIC ACTIVITIES: CPT | Mod: GO

## 2025-05-24 PROCEDURE — 2500000004 HC RX 250 GENERAL PHARMACY W/ HCPCS (ALT 636 FOR OP/ED): Performed by: INTERNAL MEDICINE

## 2025-05-24 PROCEDURE — 2500000001 HC RX 250 WO HCPCS SELF ADMINISTERED DRUGS (ALT 637 FOR MEDICARE OP): Performed by: STUDENT IN AN ORGANIZED HEALTH CARE EDUCATION/TRAINING PROGRAM

## 2025-05-24 RX ORDER — METFORMIN HYDROCHLORIDE 1000 MG/1
1000 TABLET, EXTENDED RELEASE ORAL
Qty: 30 TABLET | Refills: 0 | Status: SHIPPED | OUTPATIENT
Start: 2025-05-24 | End: 2025-06-23

## 2025-05-24 RX ORDER — POTASSIUM CHLORIDE 20 MEQ/1
20 TABLET, EXTENDED RELEASE ORAL DAILY
Qty: 30 TABLET | Refills: 11 | Status: SHIPPED | OUTPATIENT
Start: 2025-05-24 | End: 2025-06-03 | Stop reason: SINTOL

## 2025-05-24 RX ORDER — DEXAMETHASONE 4 MG/1
4 TABLET ORAL EVERY 12 HOURS SCHEDULED
Qty: 60 TABLET | Refills: 0 | Status: SHIPPED | OUTPATIENT
Start: 2025-05-24 | End: 2025-06-23

## 2025-05-24 RX ORDER — METOPROLOL TARTRATE 50 MG/1
TABLET ORAL
Start: 2025-05-24 | End: 2025-06-03 | Stop reason: SDUPTHER

## 2025-05-24 RX ORDER — ESCITALOPRAM OXALATE 10 MG/1
10 TABLET ORAL DAILY
Qty: 30 TABLET | Refills: 0 | Status: SHIPPED | OUTPATIENT
Start: 2025-05-25 | End: 2025-06-24

## 2025-05-24 RX ADMIN — DEXAMETHASONE 4 MG: 4 TABLET ORAL at 04:11

## 2025-05-24 RX ADMIN — POTASSIUM CHLORIDE 20 MEQ: 1500 TABLET, EXTENDED RELEASE ORAL at 08:40

## 2025-05-24 RX ADMIN — ESCITALOPRAM OXALATE 10 MG: 10 TABLET ORAL at 08:40

## 2025-05-24 RX ADMIN — METOPROLOL TARTRATE 25 MG: 25 TABLET, FILM COATED ORAL at 08:40

## 2025-05-24 RX ADMIN — INSULIN LISPRO 6 UNITS: 100 INJECTION, SOLUTION INTRAVENOUS; SUBCUTANEOUS at 13:16

## 2025-05-24 RX ADMIN — APIXABAN 5 MG: 5 TABLET, FILM COATED ORAL at 08:40

## 2025-05-24 RX ADMIN — INSULIN LISPRO 2 UNITS: 100 INJECTION, SOLUTION INTRAVENOUS; SUBCUTANEOUS at 08:44

## 2025-05-24 ASSESSMENT — COGNITIVE AND FUNCTIONAL STATUS - GENERAL
CLIMB 3 TO 5 STEPS WITH RAILING: TOTAL
TURNING FROM BACK TO SIDE WHILE IN FLAT BAD: A LITTLE
WALKING IN HOSPITAL ROOM: A LITTLE
HELP NEEDED FOR BATHING: A LOT
TOILETING: A LOT
HELP NEEDED FOR BATHING: A LITTLE
STANDING UP FROM CHAIR USING ARMS: A LITTLE
MOVING FROM LYING ON BACK TO SITTING ON SIDE OF FLAT BED WITH BEDRAILS: A LITTLE
DRESSING REGULAR LOWER BODY CLOTHING: A LOT
EATING MEALS: A LITTLE
DAILY ACTIVITIY SCORE: 14
MOVING FROM LYING ON BACK TO SITTING ON SIDE OF FLAT BED WITH BEDRAILS: A LITTLE
DRESSING REGULAR LOWER BODY CLOTHING: A LITTLE
CLIMB 3 TO 5 STEPS WITH RAILING: A LOT
PERSONAL GROOMING: A LITTLE
MOVING TO AND FROM BED TO CHAIR: A LOT
EATING MEALS: A LITTLE
DRESSING REGULAR UPPER BODY CLOTHING: A LITTLE
STANDING UP FROM CHAIR USING ARMS: A LITTLE
TOILETING: A LITTLE
TURNING FROM BACK TO SIDE WHILE IN FLAT BAD: A LOT
MOVING TO AND FROM BED TO CHAIR: A LITTLE
WALKING IN HOSPITAL ROOM: A LOT
MOBILITY SCORE: 17
DAILY ACTIVITIY SCORE: 18
PERSONAL GROOMING: A LITTLE
DRESSING REGULAR UPPER BODY CLOTHING: A LOT
MOBILITY SCORE: 13

## 2025-05-24 ASSESSMENT — PAIN SCALES - GENERAL
PAINLEVEL_OUTOF10: 0 - NO PAIN

## 2025-05-24 ASSESSMENT — ACTIVITIES OF DAILY LIVING (ADL): HOME_MANAGEMENT_TIME_ENTRY: 7

## 2025-05-24 ASSESSMENT — PAIN - FUNCTIONAL ASSESSMENT
PAIN_FUNCTIONAL_ASSESSMENT: 0-10
PAIN_FUNCTIONAL_ASSESSMENT: 0-10

## 2025-05-24 NOTE — PROGRESS NOTES
05/24/25 1452   Discharge Planning   Expected Discharge Disposition Home H   Does the patient need discharge transport arranged? No     Patient and family are now refusing skilled rehab.     They would like home health care on discharge.   They stated they can receive home therapy via [facility] in [place].   Clinicals and home care referral faxed to agency number at [phone], office is currently closed so unable to confirm receipt and acceptance.     Number placed on AVS, family updated they should follow up when office opens on Tuesday.

## 2025-05-24 NOTE — DISCHARGE INSTR - APPOINTMENTS
Home Health Referral sent to [facility]  [address], [place] [address]    Phone number:  [phone]    Please call [facility] at the number above to confirm they have received the home care referral and are able to start services.

## 2025-05-24 NOTE — CARE PLAN
The patient's goals for the shift include      The clinical goals for the shift include safety      Problem: Diabetes  Goal: Achieve decreasing blood glucose levels by end of shift  Outcome: Progressing  Goal: Increase stability of blood glucose readings by end of shift  Outcome: Progressing  Goal: Maintain electrolyte levels within acceptable range throughout shift  Outcome: Progressing  Goal: Vital signs within normal range for age by end of shift  Outcome: Progressing  Goal: Receive DSME education by end of shift  Outcome: Progressing     Problem: Pain - Adult  Goal: Verbalizes/displays adequate comfort level or baseline comfort level  Outcome: Progressing     Problem: Safety - Adult  Goal: Free from fall injury  Outcome: Progressing     Problem: Discharge Planning  Goal: Discharge to home or other facility with appropriate resources  Outcome: Progressing     Problem: Chronic Conditions and Co-morbidities  Goal: Patient's chronic conditions and co-morbidity symptoms are monitored and maintained or improved  Outcome: Progressing     Problem: Nutrition  Goal: Nutrient intake appropriate for maintaining nutritional needs  Outcome: Progressing     Problem: General Stroke  Goal: Establish a mutual long term goal with patient by discharge  Outcome: Progressing  Goal: Demonstrate improvement in neurological exam throughout the shift  Outcome: Progressing     Problem: Diabetes  Goal: Learn about and adhere to nutrition recommendations by end of shift  Outcome: Met

## 2025-05-24 NOTE — PROGRESS NOTES
05/24/25 0838   Discharge Planning   Expected Discharge Disposition SNF   Does the patient need discharge transport arranged? Yes   RoundTrip coordination needed? Yes     [facility] confirmed they are willing and able to accept patient for skilled rehab.     No precert is needed, updates sent to facility.     MD will need to sign/certify the [facility] for skilled rehab prior to discharge

## 2025-05-24 NOTE — DISCHARGE SUMMARY
Discharge Diagnosis  Ataxia  CVA       Issues Requiring Follow-Up  Physical therapy  MRI brain  Establish with neurology  Establish with neurosurgery    Discharge Meds     Medication List      START taking these medications     dexAMETHasone 4 mg tablet; Commonly known as: Decadron; Take 1 tablet (4   mg) by mouth every 12 hours.   escitalopram 10 mg tablet; Commonly known as: Lexapro; Take 1 tablet (10   mg) by mouth once daily.; Start taking on: May 25, 2025   metFORMIN (MOD) 1,000 mg 24 hr tablet; Commonly known as: Glumetza; Take   1 tablet (1,000 mg) by mouth once daily in the evening. Take with meals.   Do not crush, chew, or split.; Replaces: metFORMIN  mg 24 hr tablet     CHANGE how you take these medications     metoprolol tartrate 50 mg tablet; Commonly known as: Lopressor; Take   half of a tablet two times per day; What changed: how much to take, how to   take this, when to take this, additional instructions     CONTINUE taking these medications     apixaban 5 mg tablet; Commonly known as: Eliquis; Take 1 tablet (5 mg)   by mouth 2 times a day.   potassium chloride CR 20 mEq ER tablet; Commonly known as: Klor-Con M20;   Take 1 tablet (20 mEq) by mouth once daily. With a meal.   Do not crush or   chew.   rosuvastatin 40 mg tablet; Commonly known as: Crestor; Take 1 tablet (40   mg) by mouth once daily.     STOP taking these medications     hydrOXYzine HCL 10 mg tablet; Commonly known as: Atarax   lisinopriL-hydrochlorothiazide 10-12.5 mg tablet   metFORMIN  mg 24 hr tablet; Commonly known as: Glucophage-XR;   Replaced by: metFORMIN (MOD) 1,000 mg 24 hr tablet       Test Results Pending At Discharge  Pending Labs       No current pending labs.            Hospital Course   Cassidy Osorio is a 74 y.o. who presented initially with Cerebrovascular Accident (Pt had + CT for infarct from outpatient PCP. Pt states symptoms have been going on for 10 days but did not want initial LAI in ER. Sent in for nidia  "and rescan. On thinners for AFIB. Denies neuro deficits at this time) and was admitted for treatment of Ataxia    They were admitted for 1 day(s) for treatment. She had a week of difficulty walking, lower extremity weakness, left hand numbness and eventually sought care at the urging of her  due to an abnormal outpatient CT scan that was concerning for stroke. MRI here confirmed stroke but was concerning for mass that was poorly differentiated on contrasted scan. She had worsening of her symptoms with concern for left chanel-neglect, however she did return to how she was on admission the following day. Unclear if worsening was due to some ativan still being in her system or if the dexamethasone that was started improved her back to her admission exam. She was seen by neurology, PT/OT while here. Arrangements were made for SNF (rehab would not accept her insurance as it was a specialty insurance due to her being [facility]), however family spoke to another member of their community who knows a physical therapy group who will see her 5x per week and she and family opted for her to go home.    This patient was discharged in stable condition. >90 minutes spent performing discharge services    Relevant Physical Exam at Time of Discharge:  Blood pressure 143/68, pulse 67, temperature 36.7 °C (98.1 °F), temperature source Temporal, resp. rate 20, height 1.676 m (5' 5.98\"), weight 74.6 kg (164 lb 7.4 oz), SpO2 96%.  Vitals Reviewed: yes  Constitutional: sitting up in bed, no acute distress  Neuro: lower extremity weakness L>R, left hand numbness  Psych: Cooperative, blunted affect, normal judgement      Outpatient Follow-Up  No future appointments.      Josi Alcala MD  "

## 2025-05-24 NOTE — PROGRESS NOTES
Occupational Therapy Treatment    Name: Cassidy Osorio  MRN: [id number]  Department: [address]  Room: [address] [place] [address]  Date: 05/24/25  Time Calculation  Start Time: 1258  Stop Time: 1325  Time Calculation (min): 27 min    Assessment:  OT Assessment: Pt demonstrated progress toward established goals and was receptive to therapy and instructions throughout.  Ataxia continues to be the primary deficit impacting LUE/LE coordination and safety with mobility.  Pt would benefit from continued OT skilled services to further address deficits.  Prognosis: Good  Barriers to Discharge Home: Caregiver assistance, Physical needs, Cognition needs  Caregiver Assistance: Caregiver assistance needed per identified barriers - however, level of patient's required assistance exceeds assistance available at home  Cognition Needs: Insight of patient limited regarding functional ability/needs  Physical Needs: Stair navigation into home limited by function/safety, Ambulating household distances limited by function/safety, 24hr mobility assistance needed, 24hr ADL assistance needed, High falls risk due to function or environment  Evaluation/Treatment Tolerance: Patient tolerated treatment well  Medical Staff Made Aware: Yes  End of Session Communication: Bedside nurse  End of Session Patient Position: Up in chair, Alarm on    Plan:  Treatment Interventions: UE strengthening/ROM, Patient/family training, Fine motor coordination activities  OT Frequency: 4 times per week  OT Discharge Recommendations: High intensity level of continued care  Equipment Recommended upon Discharge: Wheeled walker  OT Recommended Transfer Status: Assist of 2  OT - OK to Discharge: Yes      Subjective   OT Visit Info:  OT Received On: 05/24/25    General:  General  Reason for Referral: Impaired ADLs, ataxia  Past Medical History Relevant to Rehab: a-fib, OA, DM, BPPV, obesity, tricuspid valve disease, LTKA, PE, HLD  Family/Caregiver Present: Yes  Caregiver Feedback: spouse+2  other family members present  Prior to Session Communication: Bedside nurse  Patient Position Received: Up in chair, Alarm on  Preferred Learning Style: verbal, visual  General Comment: Cleared by nursing and agreeable for therapy    Precautions:  Hearing/Visual Limitations: +corrective lenses  Medical Precautions: Fall precautions    Pain Assessment:  Pain Assessment  Pain Assessment: 0-10  0-10 (Numeric) Pain Score: 0 - No pain      Objective   Cognition:  Overall Cognitive Status: Within Functional Limits  Orientation Level: Oriented X4  Following Commands: Follows one step commands with repetition  Insight: Mild  Processing Speed: Delayed    Therapy/Activity: Therapeutic Exercise  Therapeutic Exercise Performed: Yes (Pt instructed in/performed LUE AROM exercises x 10 reps each with cues for technique in order to increase FM/GM coordination needed for ADLs.)    Therapeutic Activity  Therapeutic Activity Performed: Yes (Pt instructed in/performed yellow theraputty activities with left hand to increase hand /FM coordination needed for ADLs.  Issued same for home use.)    Vision:  Vision  Vision Comments: Convergence, tracking, peripheral vision tested intact this date    Outcome Measures:  [facility] Daily Activity  Putting on and taking off regular lower body clothing: A lot  Bathing (including washing, rinsing, drying): A lot  Putting on and taking off regular upper body clothing: A lot  Toileting, which includes using toilet, bedpan or urinal: A lot  Taking care of personal grooming such as brushing teeth: A little  Eating Meals: A little  Daily Activity - Total Score: 14    Education Documentation  Precautions, taught by Nicolasa Richey OT at 5/24/2025  2:08 PM.  Learner: Family, Patient  Readiness: Acceptance  Method: Explanation  Response: Verbalizes Understanding  Comment: Instrructed in home safety for fall prevention    Home Exercise Program, taught by Nicolasa Richey OT at 5/24/2025  2:07  PM.  Learner: Patient  Readiness: Acceptance  Method: Explanation  Response: Demonstrated Understanding  Comment: Provided with putty and a HEP    Goals:  Encounter Problems       Encounter Problems (Active)       OT Goals       ADLs (Progressing)       Start:  05/22/25    Expected End:  06/05/25       Patient will complete ADL tasks, with modified independent using AE need in order to increase patient's safety and independence with self-care tasks.           Functional transfers (Progressing)       Start:  05/22/25    Expected End:  06/05/25       Patient will complete functional transfers with modified independent using least restrictive device, in order to increase patient's safety and independence with daily tasks.           Activity tolerance (Progressing)       Start:  05/22/25    Expected End:  06/05/25       Patient will demonstrate the ability to participate in functional activity at least >/= 25 minutes in order to increase patient's safety and independence with daily tasks.

## 2025-05-24 NOTE — PROGRESS NOTES
Physical Therapy    Physical Therapy Treatment    Patient Name: Cassidy Osorio  MRN: [id number]  Department: [address]  Room: [address] [place], [place] [address]  Today's Date: 5/24/2025  Time Calculation  Start Time: 1328  Stop Time: 1344  Time Calculation (min): 16 min         Assessment/Plan   PT Assessment  Evaluation/Treatment Tolerance: Patient tolerated treatment well  Medical Staff Made Aware: Yes  Strengths: Support of Caregivers, Support of extended family/friends, Ability to acquire knowledge, Premorbid level of function  Barriers to Participation: Comorbidities  End of Session Communication: Bedside nurse  Assessment Comment: Continues to demonstrate impaired safey mobility  and decreased strength warranting skilled PT care.  End of Session Patient Position: Up in chair, Alarm on     PT Plan  Treatment/Interventions: Bed mobility, Transfer training, Gait training, Stair training, Balance training, Strengthening, Endurance training, Therapeutic activity, Therapeutic exercise, Home exercise program, Neuromuscular re-education, Positioning, Postural re-education  PT Plan: Ongoing PT  PT Frequency: 5 times per week  PT Discharge Recommendations: High intensity level of continued care  Equipment Recommended upon Discharge: Wheeled walker  PT Recommended Transfer Status: Assist x2, Assistive device  PT - OK to Discharge: Yes    PT Visit Info:  PT Received On: 05/24/25  Response to Previous Treatment: Patient with no complaints from previous session.     General Visit Information:   General  Family/Caregiver Present: Yes  Caregiver Feedback: spouse+2 other family members present  Prior to Session Communication: Bedside nurse  Patient Position Received: Up in chair, Alarm on  Preferred Learning Style: verbal, visual  General Comment: Cleared by nursing and agreeable for therapy    Subjective   Precautions:  Precautions  Hearing/Visual Limitations: +corrective lenses  Medical Precautions: Fall precautions            Objective   Pain:  Pain  Assessment  Pain Assessment: 0-10  0-10 (Numeric) Pain Score: 0 - No pain  Cognition:  Cognition  Overall Cognitive Status: Within Functional Limits  Orientation Level: Oriented X4  Following Commands: Follows one step commands consistently  Insight: Mild        Activity Tolerance:  Activity Tolerance  Endurance: Decreased tolerance for upright activites  Treatments:  Therapeutic Exercise  Therapeutic Exercise Performed: Yes  Therapeutic Exercise Activity 1: Seated LE exercises including heel/toe raises, LAQ, marching, Hip abduction isometric and hip adduction isometric, x 10 each exercise on each side              Ambulation/Gait Training  Ambulation/Gait Training Performed: Yes  Ambulation/Gait Training 1  Surface 1: Level tile  Device 1: Rolling walker  Assistance 1: Moderate assistance  Quality of Gait 1: Narrow base of support, Diminished heel strike, Inconsistent stride length, Decreased step length, Shuffling gait, Forward flexed posture  Comments/Distance (ft) 1: 15 feet x 2 in room, improved lifting and advancing left lower extremity while walking. Needed increased cues and time for moving left LE with  fatigue while walking. Needed reapeated cues to turn walker when encountering an obstacle. Moderate assist x 1 this date (secondary assist   this date for safety with CGA).  Transfers  Transfer: Yes  Transfer 1  Transfer From 1: Sit to  Transfer to 1: Stand  Technique 1: Sit to stand, Stand to sit  Transfer Device 1: Walker  Transfer Level of Assistance 1: Minimum assistance  Trials/Comments 1: Verbal cues for hand placement and to push off from chair to get to standing. Needed tactile cue to place left hand on walker. Cues for reaching back for chair when sitting, improved carry over from previous sessions    Outcome Measures:  [facility] Basic Mobility  Turning from your back to your side while in a flat bed without using bedrails: A little  Moving from lying on your back to sitting on the side of a flat bed  without using bedrails: A lot  Moving to and from bed to chair (including a wheelchair): A lot  Standing up from a chair using your arms (e.g. wheelchair or bedside chair): A little  To walk in hospital room: A lot  Climbing 3-5 steps with railing: Total  Basic Mobility - Total Score: 13    Education Documentation  Home Exercise Program, taught by Jenny Pearson, PT at 5/24/2025  2:33 PM.  Learner: Family, Patient  Readiness: Acceptance  Method: Explanation, Demonstration  Response: Verbalizes Understanding, Needs Reinforcement  Comment: Educated Patient and family on safe mobility techniques, safety with use of walker    Mobility Training, taught by Jenny Pearson, PT at 5/24/2025  2:33 PM.  Learner: Family, Patient  Readiness: Acceptance  Method: Explanation, Demonstration  Response: Verbalizes Understanding, Needs Reinforcement  Comment: Educated Patient and family on safe mobility techniques, safety with use of walker    Education Comments  No comments found.            Encounter Problems       Encounter Problems (Active)       Balance       Sitting Balance (Progressing)       Start:  05/22/25    Expected End:  06/05/25       Pt will demonstrate good sitting balance to promote safe engagement with out of bed activities           Standing Balance (Progressing)       Start:  05/22/25    Expected End:  06/05/25       Pt will demonstrate good static standing balance to promote safe participation with out of bed activity, transfers, and mobility              Mobility       Ambulation (Progressing)       Start:  05/22/25    Expected End:  06/05/25       Pt will ambulate 50' modified independent assist with walker to promote safe home mobility           Entry Stair Negotiation (Progressing)       Start:  05/22/25    Expected End:  06/05/25       Pt will ascend/descend 5 stairs with rail(s) on B and modified independent assist to promote safe entry and exit in home environment                PT Transfers       Supine to  sit (Progressing)       Start:  05/22/25    Expected End:  06/05/25       Pt will transfer supine to sitting at edge of bed with modified independent assist to promote acute care out of bed activity           Sit to stand (Progressing)       Start:  05/22/25    Expected End:  06/05/25       Pt will transfer sit to standing position with modified independent assist and walker to promote safe out of bed activity           Bed to chair (Progressing)       Start:  05/22/25    Expected End:  06/05/25       Pt will transfer from sitting edge of bed to the chair with modified independent assist and walker to promote out of bed activity and reduce the risks of prolonged acute care bedrest              Pain - Adult          Safety       Safe Mobility Techniques (Progressing)       Start:  05/22/25    Expected End:  06/05/25       Pt will correctly identify and demonstrate safe mobility techniques to reduce their risks for falls during their acute care stay

## 2025-05-24 NOTE — DISCHARGE INSTRUCTIONS
Please ensure that either Neurology or Neurosurgery are able to order a follow-up MRI scan of the brain in 4-8 weeks from now. The information for the office of Dr Idania Chicas is provided in case there is difficulty getting this arranged with [facility] or other nearby facilities

## 2025-05-24 NOTE — CARE PLAN
The patient's goals for the shift include  safety    The clinical goals for the shift include safety, neuro checks      Problem: Safety - Adult  Goal: Free from fall injury  Outcome: Progressing

## 2025-05-25 LAB
ATRIAL RATE: 61 BPM
P AXIS: 4 DEGREES
P OFFSET: 207 MS
P ONSET: 161 MS
PR INTERVAL: 112 MS
Q ONSET: 217 MS
QRS COUNT: 10 BEATS
QRS DURATION: 84 MS
QT INTERVAL: 426 MS
QTC CALCULATION(BAZETT): 428 MS
QTC FREDERICIA: 428 MS
R AXIS: 12 DEGREES
T AXIS: 19 DEGREES
T OFFSET: 430 MS
VENTRICULAR RATE: 61 BPM

## 2025-05-27 ENCOUNTER — PATIENT OUTREACH (OUTPATIENT)
Dept: PRIMARY CARE | Facility: CLINIC | Age: 75
End: 2025-05-27
Payer: COMMERCIAL

## 2025-05-27 NOTE — PROGRESS NOTES
TCM completed 05/27/25   Discharge Facility:  [facility]   Discharge Diagnosis: Ataxia, CVA  Admission Date: 5/22/25  Discharge Date: 5/24/25     PCP Appointment Date: Pt declined    (Needs seen by: 6/6)  Specialist Appointment Date:   Neurology-  TBD  Podiatry- today 5/27/25    Hospital Encounter and Summary Linked: Yes  ED to Hosp-Admission (Discharged) (Admit)     Issues Requiring Follow-Up:  Physical therapy  MRI brain  Establish with neurology  Establish with neurosurgery    Unable to reach patient; Two attempts were made to reach patient within two business days after discharge. Message left with patients daughter, who answered the phone, but is not listed on HIPAA. Requested a call back from the patient once she returns home from her physician appt.  No return call as of this note.

## 2025-06-02 ENCOUNTER — TELEPHONE (OUTPATIENT)
Dept: PRIMARY CARE | Facility: CLINIC | Age: 75
End: 2025-06-02
Payer: COMMERCIAL

## 2025-06-02 NOTE — TELEPHONE ENCOUNTER
Physical therapist called was concerned about her bp the at home was 174/89 and 167/90 manual was 188/91. He said that everytime he's been out her BP has been high. She recently had a stroke.  Per Dr. Bush scheduled an appt for tomorrow 06/02/2025.

## 2025-06-03 ENCOUNTER — OFFICE VISIT (OUTPATIENT)
Dept: PRIMARY CARE | Facility: CLINIC | Age: 75
End: 2025-06-03
Payer: COMMERCIAL

## 2025-06-03 VITALS
BODY MASS INDEX: 25.03 KG/M2 | OXYGEN SATURATION: 97 % | SYSTOLIC BLOOD PRESSURE: 138 MMHG | HEART RATE: 82 BPM | DIASTOLIC BLOOD PRESSURE: 72 MMHG | WEIGHT: 155 LBS

## 2025-06-03 DIAGNOSIS — R90.89 ABNORMAL BRAIN MRI: ICD-10-CM

## 2025-06-03 DIAGNOSIS — I10 PRIMARY HYPERTENSION: ICD-10-CM

## 2025-06-03 DIAGNOSIS — G81.94 LEFT HEMIPLEGIA (MULTI): Primary | ICD-10-CM

## 2025-06-03 DIAGNOSIS — I63.9 ACUTE CVA (CEREBROVASCULAR ACCIDENT) (MULTI): ICD-10-CM

## 2025-06-03 PROCEDURE — 1036F TOBACCO NON-USER: CPT | Performed by: FAMILY MEDICINE

## 2025-06-03 PROCEDURE — 99495 TRANSJ CARE MGMT MOD F2F 14D: CPT | Performed by: FAMILY MEDICINE

## 2025-06-03 PROCEDURE — 3075F SYST BP GE 130 - 139MM HG: CPT | Performed by: FAMILY MEDICINE

## 2025-06-03 PROCEDURE — 3078F DIAST BP <80 MM HG: CPT | Performed by: FAMILY MEDICINE

## 2025-06-03 PROCEDURE — 1111F DSCHRG MED/CURRENT MED MERGE: CPT | Performed by: FAMILY MEDICINE

## 2025-06-03 PROCEDURE — 1160F RVW MEDS BY RX/DR IN RCRD: CPT | Performed by: FAMILY MEDICINE

## 2025-06-03 PROCEDURE — 3044F HG A1C LEVEL LT 7.0%: CPT | Performed by: FAMILY MEDICINE

## 2025-06-03 PROCEDURE — 1159F MED LIST DOCD IN RCRD: CPT | Performed by: FAMILY MEDICINE

## 2025-06-03 PROCEDURE — 3048F LDL-C <100 MG/DL: CPT | Performed by: FAMILY MEDICINE

## 2025-06-03 RX ORDER — METOPROLOL TARTRATE 50 MG/1
50 TABLET ORAL 2 TIMES DAILY
Start: 2025-06-03

## 2025-06-03 ASSESSMENT — PATIENT HEALTH QUESTIONNAIRE - PHQ9
2. FEELING DOWN, DEPRESSED OR HOPELESS: NOT AT ALL
1. LITTLE INTEREST OR PLEASURE IN DOING THINGS: NOT AT ALL
SUM OF ALL RESPONSES TO PHQ9 QUESTIONS 1 AND 2: 0

## 2025-06-03 NOTE — PATIENT INSTRUCTIONS
Keep up the therapy  - that is great!    I will contact you about who to follow up with after I speak to the specialist.     Stop the potassium.    High potassium diet.     If the heart burn continues - first try pepcid 20 mg a day as needed -   And if you need it daily -   Take Prilosec or Nexium or Prevacid daily.

## 2025-06-03 NOTE — PROGRESS NOTES
"Patient: Cassidy Osorio  : 1950  PCP: Jeanne Colbert MD  MRN: [id number]  Program: Transitional Care Management  Status: Enrolled  Effective Dates: 2025 - present  Responsible Staff: Joan Tena  Social Drivers to be Addressed: Physical Activity, Social Connections, Stress         Cassidy Osorio is a 74 y.o. female presenting today for follow-up after being discharged from the hospital 10 days ago. The main problem requiring admission was Acute CVA . The discharge summary and/or Transitional Care Management documentation was reviewed. Medication reconciliation was performed as indicated via the \"Kervin as Reviewed\" timestamp.     Cassidy Osorio was contacted by Transitional Care Management services two days after her discharge. This encounter and supporting documentation was reviewed.    Discharge Summary  -   \"Hospital Course   Cassidy Osorio is a 74 y.o. who presented initially with Cerebrovascular Accident (Pt had + CT for infarct from outpatient PCP. Pt states symptoms have been going on for 10 days but did not want initial LAI in ER. Sent in for eval and rescan. On thinners for AFIB. Denies neuro deficits at this time) and was admitted for treatment of Ataxia     They were admitted for 1 day(s) for treatment. She had a week of difficulty walking, lower extremity weakness, left hand numbness and eventually sought care at the urging of her  due to an abnormal outpatient CT scan that was concerning for stroke. MRI here confirmed stroke but was concerning for mass that was poorly differentiated on contrasted scan. She had worsening of her symptoms with concern for left chanel-neglect, however she did return to how she was on admission the following day. Unclear if worsening was due to some ativan still being in her system or if the dexamethasone that was started improved her back to her admission exam. She was seen by neurology, PT/OT while here. Arrangements were made for SNF (rehab would not " "accept her insurance as it was a specialty insurance due to her being [employer]), however family spoke to another member of their community who knows a physical therapy group who will see her 5x per week and she and family opted for her to go home.     This patient was discharged in stable condition. >90 minutes spent performing discharge services\"       Today -   Pt here with  -   PT coming out daily -   She is improving -   Less numbness of the left hand  Moving arm and leg better on the left     PT was very concerned about BP - was in the 180s; - but  increased her metoprolol back to 50 mg BID and its much better today.     They do not have follow up planned yet -   was calling placed with [facility] - but they are not sure what to do.     Last MRI concerning for underlying neoplasm  (Pt does not have a hx of cancer)  -   Suggestion was to repeat MRI 3 mos     Her only complaint is upset stomach -   She is still on potassium -and on dexamethasone for a month too       Current meds :     Current Outpatient Medications:     apixaban (Eliquis) 5 mg tablet, Take 1 tablet (5 mg) by mouth 2 times a day., Disp: 56 tablet, Rfl: 0    dexAMETHasone (Decadron) 4 mg tablet, Take 1 tablet (4 mg) by mouth every 12 hours., Disp: 60 tablet, Rfl: 0    escitalopram (Lexapro) 10 mg tablet, Take 1 tablet (10 mg) by mouth once daily., Disp: 30 tablet, Rfl: 0    metFORMIN XR (Glumetza) 1,000 mg 24 hr tablet, Take 1 tablet (1,000 mg) by mouth once daily in the evening. Take with meals. Do not crush, chew, or split., Disp: 30 tablet, Rfl: 0    rosuvastatin (Crestor) 40 mg tablet, Take 1 tablet (40 mg) by mouth once daily., Disp: 90 tablet, Rfl: 3    metoprolol tartrate (Lopressor) 50 mg tablet, Take 1 tablet by mouth 2 times a day. Take half of a tablet two times per day, Disp: , Rfl:      Review of Systems    /72   Pulse 82   Wt 70.3 kg (155 lb)   SpO2 97%   BMI 25.03 kg/m²     Physical Exam  Vitals " reviewed.   Constitutional:       General: She is not in acute distress.     Comments: In Wheelchair    HENT:      Head: Normocephalic and atraumatic.      Nose: Nose normal.      Mouth/Throat:      Mouth: Mucous membranes are moist.      Pharynx: No posterior oropharyngeal erythema.   Eyes:      Extraocular Movements: Extraocular movements intact.      Conjunctiva/sclera: Conjunctivae normal.      Pupils: Pupils are equal, round, and reactive to light.   Cardiovascular:      Rate and Rhythm: Normal rate and regular rhythm.      Heart sounds: Normal heart sounds. No murmur heard.  Pulmonary:      Effort: Pulmonary effort is normal. No respiratory distress.      Breath sounds: Normal breath sounds. No wheezing.   Musculoskeletal:      Cervical back: No rigidity.   Lymphadenopathy:      Cervical: No cervical adenopathy.   Skin:     General: Skin is warm and dry.      Findings: No rash.   Neurological:      Mental Status: She is alert.      Comments: Good bilateral  now   Better able to resist on the left -   Able to lift left leg up off the feet on the wheel chair      Psychiatric:         Mood and Affect: Mood normal.         Thought Content: Thought content normal.         The complexity of medical decision making for this patient's transitional care is moderate.    Assessment/Plan   Assessment & Plan  Acute CVA (cerebrovascular accident) (Multi)    Daily PT at home  - going well   Abnormal brain MRI    Concern for underlying neoplasm -   I am reaching out to neurosurgery to determine best next course of action -   Will get back to them when I hear from someone.   Left hemiplegia (Multi)    Improving with PT     Primary hypertension    Orders:    metoprolol tartrate (Lopressor) 50 mg tablet; Take 1 tablet by mouth 2 times a day. Take half of a tablet two times per day    Better today - stay on current meds.         Stopping potassium  - it upsets her stomach   I will reach out to them soon.   To try pepcid      We discussed at visit any disease processes that were of concern as well as the risks, benefits and instructions of any new medication provided.    See orders and discussion section for information provided to patient in their After Visit Summary.   Patient (and/or caretaker of patient if present)  stated all questions were answered, and they voiced understanding of instructions.

## 2025-06-05 ENCOUNTER — APPOINTMENT (OUTPATIENT)
Dept: PRIMARY CARE | Facility: CLINIC | Age: 75
End: 2025-06-05
Payer: COMMERCIAL

## 2025-06-05 ENCOUNTER — DOCUMENTATION (OUTPATIENT)
Dept: PRIMARY CARE | Facility: CLINIC | Age: 75
End: 2025-06-05

## 2025-06-05 DIAGNOSIS — I63.9 ACUTE CVA (CEREBROVASCULAR ACCIDENT) (MULTI): Primary | ICD-10-CM

## 2025-06-05 DIAGNOSIS — R90.89 ABNORMAL BRAIN MRI: ICD-10-CM

## 2025-06-05 PROBLEM — M76.829 TIBIALIS POSTERIOR DYSFUNCTION: Status: RESOLVED | Noted: 2025-01-15 | Resolved: 2025-06-05

## 2025-06-05 PROBLEM — Z86.79 HISTORY OF VALVULAR HEART DISEASE: Status: ACTIVE | Noted: 2025-01-14

## 2025-06-05 PROBLEM — Z96.651 HISTORY OF TOTAL RIGHT KNEE REPLACEMENT: Status: ACTIVE | Noted: 2024-06-11

## 2025-06-05 PROBLEM — R39.198 DIFFICULTY URINATING: Status: RESOLVED | Noted: 2025-01-14 | Resolved: 2025-06-05

## 2025-06-05 PROBLEM — I35.0 AORTIC STENOSIS: Status: ACTIVE | Noted: 2025-01-15

## 2025-06-05 PROBLEM — S86.111S: Status: RESOLVED | Noted: 2025-01-29 | Resolved: 2025-06-05

## 2025-06-05 PROBLEM — M62.461 GASTROCNEMIUS EQUINUS, RIGHT: Status: ACTIVE | Noted: 2025-01-15

## 2025-06-05 PROBLEM — M21.6X1: Status: ACTIVE | Noted: 2025-01-15

## 2025-06-05 NOTE — ASSESSMENT & PLAN NOTE
Orders:    metoprolol tartrate (Lopressor) 50 mg tablet; Take 1 tablet by mouth 2 times a day. Take half of a tablet two times per day    Better today - stay on current meds.

## 2025-06-17 ENCOUNTER — OFFICE VISIT (OUTPATIENT)
Dept: NEUROSURGERY | Facility: HOSPITAL | Age: 75
End: 2025-06-17
Payer: COMMERCIAL

## 2025-06-17 VITALS
SYSTOLIC BLOOD PRESSURE: 138 MMHG | RESPIRATION RATE: 17 BRPM | WEIGHT: 155 LBS | BODY MASS INDEX: 28.52 KG/M2 | HEART RATE: 66 BPM | HEIGHT: 62 IN | DIASTOLIC BLOOD PRESSURE: 87 MMHG

## 2025-06-17 DIAGNOSIS — R90.89 ABNORMAL BRAIN MRI: ICD-10-CM

## 2025-06-17 DIAGNOSIS — I63.9 ACUTE CVA (CEREBROVASCULAR ACCIDENT) (MULTI): ICD-10-CM

## 2025-06-17 DIAGNOSIS — R93.89 ABNORMAL MRI: Primary | ICD-10-CM

## 2025-06-17 PROCEDURE — 1111F DSCHRG MED/CURRENT MED MERGE: CPT | Performed by: NURSE PRACTITIONER

## 2025-06-17 PROCEDURE — 3075F SYST BP GE 130 - 139MM HG: CPT | Performed by: NURSE PRACTITIONER

## 2025-06-17 PROCEDURE — 3048F LDL-C <100 MG/DL: CPT | Performed by: NURSE PRACTITIONER

## 2025-06-17 PROCEDURE — 99203 OFFICE O/P NEW LOW 30 MIN: CPT | Performed by: NURSE PRACTITIONER

## 2025-06-17 PROCEDURE — 1159F MED LIST DOCD IN RCRD: CPT | Performed by: NURSE PRACTITIONER

## 2025-06-17 PROCEDURE — 3044F HG A1C LEVEL LT 7.0%: CPT | Performed by: NURSE PRACTITIONER

## 2025-06-17 PROCEDURE — 3079F DIAST BP 80-89 MM HG: CPT | Performed by: NURSE PRACTITIONER

## 2025-06-17 PROCEDURE — 99213 OFFICE O/P EST LOW 20 MIN: CPT | Performed by: NURSE PRACTITIONER

## 2025-06-17 NOTE — PROGRESS NOTES
"[facility]  Neurosurgery    History of Present Illness      Cassidy Osorio is a 74-year-old female with a PMH significant for Afib on Eliquis, HTN, HLD, aortic stenosis (TTE 01/2025 EF 65-70%),  DVT/PE (2023), DMT2, who presented for evaluation to [facility] ED on 05/22 with 10 days complaint of left sided weakness/ numbness and ataxia. MR/ MRA without vascular abnormality or aneurysm but was noted to have right parietal and temporal lobe enhancement with significant vasogenic edema. Neurology was consulted and patient was started on dexamethasone 4mg BID. Patient was recommended for transfer to inpatient facility but declined opting for outpatient follow up. Patient was referred to neurosurgery and presents to clinic for NPV.     Since being home patient has become total care needing assistance with dressing and ADLs. Continued left sided weakness and is not able to get up and down stairs. Limited movement to her left leg. Now working with outpatient therapy x 5 days a week. Denies any headache, seizure activity, or dizziness. Persistent numbness to left side. Remains on steroids 4mg BID - has 8 days left of current prescription.     Endorses to confusion and is having difficulties with telling time.     Last CT A/P with intramural fibroid to L uterine fundus. No prior colonoscopy or mammogram for review. Last CT chest was in 2023 when she was diagnosed with PE.         Objective      Vitals:   /87   Pulse 66   Resp 17   Ht 1.575 m (5' 2\")   Wt 70.3 kg (155 lb)   BMI 28.35 kg/m²         Physical Exam:    Alert and oriented x 3   Fluent speech   Item recall at 2/ 3 at 5 minutes   EOMI; FC x 4   FIGUEROA; no drift    -- strength equal 5/5, LLE 4/5, LUE 4/5, RUE/ RLE 5/5   Sitting up in wheelchair   Left sided finger thumb tap slower       Relevant Results:    MRI brain w contrast 05/23/2025:               Assessment & Plan      Diagnosis:  Cassidy was seen today for consult.  Diagnoses and all orders for " this visit:  Abnormal MRI  -     Tumor Board Request; Future          Provider Impression:     Patient is a 74-year-old female presenting for initial evaluation after she was admitted for left sided weakness and ataxia. MR imaging with significant FLAIR / T2 hyperintensity concerning for underlying lesion. No recent CT chest but A/P without malignancy. Continued L sided weakness and ataxia on exam. No improvement with PT.     - Urgent request for CNS TB review   - Will need updated staging CT C/A/P    --Hold off on ordering until after TB review   - Anticipate advance imaging     Plan discussed with patient and  who was in agreement. All questions answered.     Medical History     Medical History[1]  Surgical History[2]  Social History[3]  Family History[4]  Allergies[5]  Current Outpatient Medications   Medication Instructions    apixaban (ELIQUIS) 5 mg, oral, 2 times daily    dexAMETHasone (DECADRON) 4 mg, oral, Every 12 hours scheduled    escitalopram (LEXAPRO) 10 mg, oral, Daily    metFORMIN (MOD) (GLUMETZA) 1,000 mg, oral, Daily with evening meal, Do not crush, chew, or split.    metoprolol tartrate (LOPRESSOR) 50 mg, oral, 2 times daily, Take half of a tablet two times per day    rosuvastatin (CRESTOR) 40 mg, oral, Daily                              [1]   Past Medical History:  Diagnosis Date    Difficulty urinating 01/14/2025    History of left knee replacement 01/30/2024    dr Jefferson    Influenza 04/28/2023    PE (pulmonary thromboembolism) (Multi) 02/13/2023    Personal history of other diseases of the circulatory system     Personal history of cardiac murmur    Personal history of other diseases of the respiratory system 06/05/2018    History of bronchitis    Personal history of other endocrine, nutritional and metabolic disease 08/22/2016    History of obesity    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

## 2025-06-17 NOTE — PATIENT INSTRUCTIONS
Welcome to Jessica Holley, CNP clinic. We are here to assist you through your Neurological Surgery care at [facility]. My office number is [phone]. This number is the most direct way to communicate with the office. The office fax number is [phone].     I will call you tomorrow with an updated plan of care once our conference has concluded. This can be any time between 11-1pm.

## 2025-06-18 ENCOUNTER — TUMOR BOARD CONFERENCE (OUTPATIENT)
Dept: HEMATOLOGY/ONCOLOGY | Facility: HOSPITAL | Age: 75
End: 2025-06-18
Payer: COMMERCIAL

## 2025-06-23 ENCOUNTER — TELEPHONE (OUTPATIENT)
Dept: PRIMARY CARE | Facility: CLINIC | Age: 75
End: 2025-06-23

## 2025-06-23 ENCOUNTER — APPOINTMENT (OUTPATIENT)
Dept: PRIMARY CARE | Facility: CLINIC | Age: 75
End: 2025-06-23
Payer: COMMERCIAL

## 2025-06-23 ENCOUNTER — HOSPITAL ENCOUNTER (INPATIENT)
Facility: HOSPITAL | Age: 75
End: 2025-06-23
Attending: NEUROLOGICAL SURGERY | Admitting: NEUROLOGICAL SURGERY
Payer: COMMERCIAL

## 2025-06-23 DIAGNOSIS — G89.18 POSTOPERATIVE PAIN: ICD-10-CM

## 2025-06-23 DIAGNOSIS — G93.9 BRAIN LESION: Primary | ICD-10-CM

## 2025-06-23 DIAGNOSIS — R60.1 GENERALIZED EDEMA: ICD-10-CM

## 2025-06-23 DIAGNOSIS — I48.0 PAROXYSMAL ATRIAL FIBRILLATION (MULTI): ICD-10-CM

## 2025-06-23 DIAGNOSIS — E11.9 TYPE 2 DIABETES MELLITUS WITHOUT COMPLICATION, WITHOUT LONG-TERM CURRENT USE OF INSULIN: ICD-10-CM

## 2025-06-23 DIAGNOSIS — Z74.09 IMPAIRED MOBILITY: ICD-10-CM

## 2025-06-23 LAB — GLUCOSE BLD MANUAL STRIP-MCNC: 239 MG/DL (ref 74–99)

## 2025-06-23 PROCEDURE — 82947 ASSAY GLUCOSE BLOOD QUANT: CPT

## 2025-06-23 PROCEDURE — 2500000004 HC RX 250 GENERAL PHARMACY W/ HCPCS (ALT 636 FOR OP/ED): Performed by: NEUROLOGICAL SURGERY

## 2025-06-23 PROCEDURE — 2500000002 HC RX 250 W HCPCS SELF ADMINISTERED DRUGS (ALT 637 FOR MEDICARE OP, ALT 636 FOR OP/ED)

## 2025-06-23 PROCEDURE — 1100000001 HC PRIVATE ROOM DAILY

## 2025-06-23 PROCEDURE — 2500000001 HC RX 250 WO HCPCS SELF ADMINISTERED DRUGS (ALT 637 FOR MEDICARE OP)

## 2025-06-23 PROCEDURE — 2500000004 HC RX 250 GENERAL PHARMACY W/ HCPCS (ALT 636 FOR OP/ED)

## 2025-06-23 RX ORDER — POLYETHYLENE GLYCOL 3350 17 G/17G
17 POWDER, FOR SOLUTION ORAL DAILY
Status: DISPENSED | OUTPATIENT
Start: 2025-06-23

## 2025-06-23 RX ORDER — HEPARIN SODIUM 5000 [USP'U]/ML
5000 INJECTION, SOLUTION INTRAVENOUS; SUBCUTANEOUS EVERY 8 HOURS
Status: DISPENSED | OUTPATIENT
Start: 2025-06-23

## 2025-06-23 RX ORDER — METOPROLOL TARTRATE 50 MG/1
50 TABLET ORAL 2 TIMES DAILY
Status: DISPENSED | OUTPATIENT
Start: 2025-06-23

## 2025-06-23 RX ORDER — INSULIN LISPRO 100 [IU]/ML
0-5 INJECTION, SOLUTION INTRAVENOUS; SUBCUTANEOUS
Status: DISCONTINUED | OUTPATIENT
Start: 2025-06-24 | End: 2025-06-24

## 2025-06-23 RX ORDER — ESCITALOPRAM OXALATE 10 MG/1
10 TABLET ORAL DAILY
Status: DISPENSED | OUTPATIENT
Start: 2025-06-23

## 2025-06-23 RX ORDER — ROSUVASTATIN CALCIUM 20 MG/1
40 TABLET, COATED ORAL DAILY
Status: DISPENSED | OUTPATIENT
Start: 2025-06-23

## 2025-06-23 RX ORDER — DEXAMETHASONE 4 MG/1
4 TABLET ORAL EVERY 12 HOURS SCHEDULED
Status: DISPENSED | OUTPATIENT
Start: 2025-06-23

## 2025-06-23 RX ADMIN — ROSUVASTATIN CALCIUM 40 MG: 20 TABLET, FILM COATED ORAL at 21:57

## 2025-06-23 RX ADMIN — DEXAMETHASONE 4 MG: 4 TABLET ORAL at 21:57

## 2025-06-23 RX ADMIN — HEPARIN SODIUM 5000 UNITS: 5000 INJECTION, SOLUTION INTRAVENOUS; SUBCUTANEOUS at 21:57

## 2025-06-23 RX ADMIN — METOPROLOL TARTRATE 50 MG: 50 TABLET, FILM COATED ORAL at 21:56

## 2025-06-23 NOTE — H&P
NEUROSURGERY H&P NOTE        History Of Present Illness  Cassidy Osorio is a 74 y.o. female h/o HTN, HLD, Afib on Eliquis, aortic stenosis (1/2025 TTE EF 65-70%), DVT/PE (2023), DMT2, presented to [facility] ED on 5/22 x10d of L sided weakness/numbness and ataxia, MR/MRA no vascular abnormality, R temporoparietal enhancement w sig edema, 6/23 p/w workup for intracranial mass    Patient presents for scheduled inpatient workup of her known intracranial lesion. Patient recently saw Jessica Holley in clinic on 6/17 and was noted to need total assistance with dressing and ADLs. Daughter at bedside reports that her Left extremities are weaker and has had trouble with ambulation, needing a walker. Patient takes Eliquis for afib, last dose 6/23.       Past Medical History  She has a past medical history of Difficulty urinating (01/14/2025), History of left knee replacement (01/30/2024), Influenza (04/28/2023), PE (pulmonary thromboembolism) (Multi) (02/13/2023), Personal history of other diseases of the circulatory system, Personal history of other diseases of the respiratory system (06/05/2018), Personal history of other endocrine, nutritional and metabolic disease (08/22/2016), Tibialis posterior dysfunction (01/15/2025), and Tibialis posterior tendon rupture, right, sequela (01/29/2025).    Surgical History  She has a past surgical history that includes Total knee arthroplasty (01/30/2024) and Total knee arthroplasty (Right).     Social History  She reports that she has never smoked. She has never used smokeless tobacco. She reports that she does not drink alcohol and does not use drugs.     Allergies  Patient has no known allergies.    Medications  Prescriptions Prior to Admission[1]    Review of Systems   10 point ROS is obtained and negative except the ones mentioned in the HPI    Vitals:  There were no vitals filed for this visit.      Exam:  Constitutional: No acute distress  Resp: breathing comfortably on RA  Cardio:  well perfused  GI: nondistended  MSK: full range of motion  Neuro: Awake, A&Ox3  PEERL, EOMI, FS, hearing intact to finger rubs b/l  Naming 2/3, repetition intact    RUE 5/5  LUE 4+/5  LLE 4/5  RLE 5/5   SILT throughout all extremities    Neurological Exam  Physical Exam  Spine Musculoskeletal Exam    Last Recorded Vitals  There were no vitals taken for this visit.    Relevant Results  No orders to display                  Assessment/Plan   Assessment & Plan  Brain lesion    Cassidy Osorio is a 74 y.o. female h/o HTN, HLD, Afib on Eliquis, aortic stenosis (1/2025 TTE EF 65-70%), DVT/PE (2023), DMT2, presented to [facility] ED on 5/22 x10d of L sided weakness/numbness and ataxia, MR/MRA no vascular abnormality, R temporoparietal enhancement w sig edema, 6/23 p/w workup for intracranial mass    Plan:  - Admit to floor under NSGY  - Will discuss further workup for patients intracranial lesion in AM  - Hold Eliquis will discuss restart plan  - SCDs, SQH    Jame Duarte MD  Department of Neurosurgery   [facility]   Note authored by resident on neurosurgery team, with all questions or to contact team please page at [phone]         [1]   Medications Prior to Admission   Medication Sig Dispense Refill Last Dose/Taking    apixaban (Eliquis) 5 mg tablet Take 1 tablet (5 mg) by mouth 2 times a day. 56 tablet 0     dexAMETHasone (Decadron) 4 mg tablet Take 1 tablet (4 mg) by mouth every 12 hours. 60 tablet 0     escitalopram (Lexapro) 10 mg tablet Take 1 tablet (10 mg) by mouth once daily. 30 tablet 0     metFORMIN XR (Glumetza) 1,000 mg 24 hr tablet Take 1 tablet (1,000 mg) by mouth once daily in the evening. Take with meals. Do not crush, chew, or split. 30 tablet 0     metoprolol tartrate (Lopressor) 50 mg tablet Take 1 tablet by mouth 2 times a day. Take half of a tablet two times per day       rosuvastatin (Crestor) 40 mg tablet Take 1 tablet (40 mg) by mouth once daily. 90 tablet 3

## 2025-06-23 NOTE — TELEPHONE ENCOUNTER
I actually called Zeferino, the patient's  for her telephone appointment with  at 3 and just as I started to tell him who I was the hospital was trying to call him.  He put me on hold and when he came back to the phone he said that the hospital was calling to tell them they had a bed available.  He said he would have to head to the hospital with her and would have to cancel this telephone appointment.

## 2025-06-23 NOTE — TELEPHONE ENCOUNTER
Blood sugars are staying 270, 260 and 250's do we need to increase the metoprolol?  Let us know.    She also needs a refill on the dexamethasone and lexapro (I didn't want to put them in for pending and have this note attached to it)

## 2025-06-24 ENCOUNTER — APPOINTMENT (OUTPATIENT)
Dept: RADIOLOGY | Facility: HOSPITAL | Age: 75
End: 2025-06-24
Payer: COMMERCIAL

## 2025-06-24 LAB
ABO GROUP (TYPE) IN BLOOD: NORMAL
ALBUMIN SERPL BCP-MCNC: 3.2 G/DL (ref 3.4–5)
ANION GAP SERPL CALC-SCNC: 13 MMOL/L (ref 10–20)
ANTIBODY SCREEN: NORMAL
APPEARANCE UR: CLEAR
APTT PPP: 22 SECONDS (ref 26–36)
BILIRUB UR STRIP.AUTO-MCNC: NEGATIVE MG/DL
BUN SERPL-MCNC: 29 MG/DL (ref 6–23)
CALCIUM SERPL-MCNC: 8.6 MG/DL (ref 8.6–10.6)
CHLORIDE SERPL-SCNC: 97 MMOL/L (ref 98–107)
CO2 SERPL-SCNC: 22 MMOL/L (ref 21–32)
COLOR UR: ABNORMAL
CREAT SERPL-MCNC: 0.45 MG/DL (ref 0.5–1.05)
EGFRCR SERPLBLD CKD-EPI 2021: >90 ML/MIN/1.73M*2
ERYTHROCYTE [DISTWIDTH] IN BLOOD BY AUTOMATED COUNT: 12.5 % (ref 11.5–14.5)
GLUCOSE BLD MANUAL STRIP-MCNC: 226 MG/DL (ref 74–99)
GLUCOSE BLD MANUAL STRIP-MCNC: 274 MG/DL (ref 74–99)
GLUCOSE BLD MANUAL STRIP-MCNC: 300 MG/DL (ref 74–99)
GLUCOSE BLD MANUAL STRIP-MCNC: 304 MG/DL (ref 74–99)
GLUCOSE BLD MANUAL STRIP-MCNC: 308 MG/DL (ref 74–99)
GLUCOSE SERPL-MCNC: 348 MG/DL (ref 74–99)
GLUCOSE UR STRIP.AUTO-MCNC: ABNORMAL MG/DL
HCT VFR BLD AUTO: 40.7 % (ref 36–46)
HGB BLD-MCNC: 14.5 G/DL (ref 12–16)
INR PPP: 1.1 (ref 0.9–1.1)
KETONES UR STRIP.AUTO-MCNC: ABNORMAL MG/DL
LEUKOCYTE ESTERASE UR QL STRIP.AUTO: NEGATIVE
MCH RBC QN AUTO: 30.3 PG (ref 26–34)
MCHC RBC AUTO-ENTMCNC: 35.6 G/DL (ref 32–36)
MCV RBC AUTO: 85 FL (ref 80–100)
MUCOUS THREADS #/AREA URNS AUTO: NORMAL /LPF
NITRITE UR QL STRIP.AUTO: NEGATIVE
NRBC BLD-RTO: 0 /100 WBCS (ref 0–0)
PH UR STRIP.AUTO: 6.5 [PH]
PHOSPHATE SERPL-MCNC: 2.3 MG/DL (ref 2.5–4.9)
PLATELET # BLD AUTO: 134 X10*3/UL (ref 150–450)
POTASSIUM SERPL-SCNC: 3.1 MMOL/L (ref 3.5–5.3)
PROT UR STRIP.AUTO-MCNC: ABNORMAL MG/DL
PROTHROMBIN TIME: 11.8 SECONDS (ref 9.8–12.4)
RBC # BLD AUTO: 4.78 X10*6/UL (ref 4–5.2)
RBC # UR STRIP.AUTO: ABNORMAL MG/DL
RBC #/AREA URNS AUTO: NORMAL /HPF
RH FACTOR (ANTIGEN D): NORMAL
SODIUM SERPL-SCNC: 129 MMOL/L (ref 136–145)
SP GR UR STRIP.AUTO: 1.02
UROBILINOGEN UR STRIP.AUTO-MCNC: NORMAL MG/DL
WBC # BLD AUTO: 9.5 X10*3/UL (ref 4.4–11.3)
WBC #/AREA URNS AUTO: NORMAL /HPF

## 2025-06-24 PROCEDURE — 81001 URINALYSIS AUTO W/SCOPE: CPT | Performed by: NURSE PRACTITIONER

## 2025-06-24 PROCEDURE — 86901 BLOOD TYPING SEROLOGIC RH(D): CPT | Performed by: NURSE PRACTITIONER

## 2025-06-24 PROCEDURE — 85027 COMPLETE CBC AUTOMATED: CPT | Performed by: NURSE PRACTITIONER

## 2025-06-24 PROCEDURE — 2500000004 HC RX 250 GENERAL PHARMACY W/ HCPCS (ALT 636 FOR OP/ED): Performed by: NURSE PRACTITIONER

## 2025-06-24 PROCEDURE — 86900 BLOOD TYPING SEROLOGIC ABO: CPT | Performed by: NURSE PRACTITIONER

## 2025-06-24 PROCEDURE — 99222 1ST HOSP IP/OBS MODERATE 55: CPT | Performed by: NEUROLOGICAL SURGERY

## 2025-06-24 PROCEDURE — 85730 THROMBOPLASTIN TIME PARTIAL: CPT | Performed by: NURSE PRACTITIONER

## 2025-06-24 PROCEDURE — 2500000004 HC RX 250 GENERAL PHARMACY W/ HCPCS (ALT 636 FOR OP/ED): Performed by: NEUROLOGICAL SURGERY

## 2025-06-24 PROCEDURE — 71045 X-RAY EXAM CHEST 1 VIEW: CPT

## 2025-06-24 PROCEDURE — 80069 RENAL FUNCTION PANEL: CPT | Performed by: NURSE PRACTITIONER

## 2025-06-24 PROCEDURE — 71260 CT THORAX DX C+: CPT | Performed by: RADIOLOGY

## 2025-06-24 PROCEDURE — 2500000001 HC RX 250 WO HCPCS SELF ADMINISTERED DRUGS (ALT 637 FOR MEDICARE OP): Performed by: NURSE PRACTITIONER

## 2025-06-24 PROCEDURE — 36415 COLL VENOUS BLD VENIPUNCTURE: CPT | Performed by: NURSE PRACTITIONER

## 2025-06-24 PROCEDURE — 71045 X-RAY EXAM CHEST 1 VIEW: CPT | Performed by: RADIOLOGY

## 2025-06-24 PROCEDURE — 71260 CT THORAX DX C+: CPT

## 2025-06-24 PROCEDURE — 2500000002 HC RX 250 W HCPCS SELF ADMINISTERED DRUGS (ALT 637 FOR MEDICARE OP, ALT 636 FOR OP/ED)

## 2025-06-24 PROCEDURE — 1200000002 HC GENERAL ROOM WITH TELEMETRY DAILY

## 2025-06-24 PROCEDURE — 2500000001 HC RX 250 WO HCPCS SELF ADMINISTERED DRUGS (ALT 637 FOR MEDICARE OP)

## 2025-06-24 PROCEDURE — 2500000002 HC RX 250 W HCPCS SELF ADMINISTERED DRUGS (ALT 637 FOR MEDICARE OP, ALT 636 FOR OP/ED): Performed by: NURSE PRACTITIONER

## 2025-06-24 PROCEDURE — 2550000001 HC RX 255 CONTRASTS: Performed by: NEUROLOGICAL SURGERY

## 2025-06-24 PROCEDURE — 2500000004 HC RX 250 GENERAL PHARMACY W/ HCPCS (ALT 636 FOR OP/ED)

## 2025-06-24 PROCEDURE — 74177 CT ABD & PELVIS W/CONTRAST: CPT | Performed by: RADIOLOGY

## 2025-06-24 PROCEDURE — 82947 ASSAY GLUCOSE BLOOD QUANT: CPT

## 2025-06-24 RX ORDER — INSULIN LISPRO 100 [IU]/ML
10 INJECTION, SOLUTION INTRAVENOUS; SUBCUTANEOUS ONCE
Status: COMPLETED | OUTPATIENT
Start: 2025-06-24 | End: 2025-06-24

## 2025-06-24 RX ORDER — SODIUM CHLORIDE 9 MG/ML
100 INJECTION, SOLUTION INTRAVENOUS CONTINUOUS
Status: ACTIVE | OUTPATIENT
Start: 2025-06-24 | End: 2025-06-25

## 2025-06-24 RX ORDER — DEXTROSE 50 % IN WATER (D50W) INTRAVENOUS SYRINGE
12.5
Status: ACTIVE | OUTPATIENT
Start: 2025-06-24

## 2025-06-24 RX ORDER — INSULIN LISPRO 100 [IU]/ML
0-10 INJECTION, SOLUTION INTRAVENOUS; SUBCUTANEOUS
Status: DISCONTINUED | OUTPATIENT
Start: 2025-06-24 | End: 2025-06-24

## 2025-06-24 RX ORDER — DEXTROSE 50 % IN WATER (D50W) INTRAVENOUS SYRINGE
25
Status: ACTIVE | OUTPATIENT
Start: 2025-06-24

## 2025-06-24 RX ORDER — POTASSIUM CHLORIDE 20 MEQ/1
40 TABLET, EXTENDED RELEASE ORAL ONCE
Status: COMPLETED | OUTPATIENT
Start: 2025-06-24 | End: 2025-06-24

## 2025-06-24 RX ORDER — INSULIN LISPRO 100 [IU]/ML
0-15 INJECTION, SOLUTION INTRAVENOUS; SUBCUTANEOUS
Status: DISPENSED | OUTPATIENT
Start: 2025-06-24

## 2025-06-24 RX ADMIN — INSULIN LISPRO 10 UNITS: 100 INJECTION, SOLUTION INTRAVENOUS; SUBCUTANEOUS at 09:03

## 2025-06-24 RX ADMIN — HEPARIN SODIUM 5000 UNITS: 5000 INJECTION, SOLUTION INTRAVENOUS; SUBCUTANEOUS at 05:42

## 2025-06-24 RX ADMIN — DEXAMETHASONE 4 MG: 4 TABLET ORAL at 21:47

## 2025-06-24 RX ADMIN — SODIUM CHLORIDE 100 ML/HR: 9 INJECTION, SOLUTION INTRAVENOUS at 15:53

## 2025-06-24 RX ADMIN — IOHEXOL 75 ML: 350 INJECTION, SOLUTION INTRAVENOUS at 15:45

## 2025-06-24 RX ADMIN — INSULIN LISPRO 12 UNITS: 100 INJECTION, SOLUTION INTRAVENOUS; SUBCUTANEOUS at 12:46

## 2025-06-24 RX ADMIN — METOPROLOL TARTRATE 50 MG: 50 TABLET, FILM COATED ORAL at 21:47

## 2025-06-24 RX ADMIN — DEXAMETHASONE 4 MG: 4 TABLET ORAL at 09:03

## 2025-06-24 RX ADMIN — INSULIN LISPRO 6 UNITS: 100 INJECTION, SOLUTION INTRAVENOUS; SUBCUTANEOUS at 17:33

## 2025-06-24 RX ADMIN — METOPROLOL TARTRATE 50 MG: 50 TABLET, FILM COATED ORAL at 09:03

## 2025-06-24 RX ADMIN — HEPARIN SODIUM 5000 UNITS: 5000 INJECTION, SOLUTION INTRAVENOUS; SUBCUTANEOUS at 13:57

## 2025-06-24 RX ADMIN — POTASSIUM CHLORIDE 40 MEQ: 1500 TABLET, EXTENDED RELEASE ORAL at 13:57

## 2025-06-24 RX ADMIN — POLYETHYLENE GLYCOL 3350 17 G: 17 POWDER, FOR SOLUTION ORAL at 09:03

## 2025-06-24 RX ADMIN — HEPARIN SODIUM 5000 UNITS: 5000 INJECTION, SOLUTION INTRAVENOUS; SUBCUTANEOUS at 21:47

## 2025-06-24 RX ADMIN — ROSUVASTATIN CALCIUM 40 MG: 20 TABLET, FILM COATED ORAL at 09:03

## 2025-06-24 RX ADMIN — ESCITALOPRAM OXALATE 10 MG: 10 TABLET ORAL at 09:03

## 2025-06-24 SDOH — SOCIAL STABILITY: SOCIAL INSECURITY: HAS ANYONE EVER THREATENED TO HURT YOUR FAMILY OR YOUR PETS?: NO

## 2025-06-24 SDOH — SOCIAL STABILITY: SOCIAL INSECURITY: DOES ANYONE TRY TO KEEP YOU FROM HAVING/CONTACTING OTHER FRIENDS OR DOING THINGS OUTSIDE YOUR HOME?: NO

## 2025-06-24 SDOH — SOCIAL STABILITY: SOCIAL INSECURITY: WERE YOU ABLE TO COMPLETE ALL THE BEHAVIORAL HEALTH SCREENINGS?: YES

## 2025-06-24 SDOH — SOCIAL STABILITY: SOCIAL INSECURITY: ARE YOU OR HAVE YOU BEEN THREATENED OR ABUSED PHYSICALLY, EMOTIONALLY, OR SEXUALLY BY ANYONE?: NO

## 2025-06-24 SDOH — SOCIAL STABILITY: SOCIAL INSECURITY: DO YOU FEEL ANYONE HAS EXPLOITED OR TAKEN ADVANTAGE OF YOU FINANCIALLY OR OF YOUR PERSONAL PROPERTY?: NO

## 2025-06-24 SDOH — SOCIAL STABILITY: SOCIAL INSECURITY: HAVE YOU HAD THOUGHTS OF HARMING ANYONE ELSE?: NO

## 2025-06-24 SDOH — SOCIAL STABILITY: SOCIAL INSECURITY: ARE THERE ANY APPARENT SIGNS OF INJURIES/BEHAVIORS THAT COULD BE RELATED TO ABUSE/NEGLECT?: NO

## 2025-06-24 SDOH — SOCIAL STABILITY: SOCIAL INSECURITY: ABUSE: ADULT

## 2025-06-24 SDOH — SOCIAL STABILITY: SOCIAL INSECURITY: DO YOU FEEL UNSAFE GOING BACK TO THE PLACE WHERE YOU ARE LIVING?: NO

## 2025-06-24 SDOH — SOCIAL STABILITY: SOCIAL INSECURITY: HAVE YOU HAD ANY THOUGHTS OF HARMING ANYONE ELSE?: NO

## 2025-06-24 ASSESSMENT — ACTIVITIES OF DAILY LIVING (ADL)
PATIENT'S MEMORY ADEQUATE TO SAFELY COMPLETE DAILY ACTIVITIES?: NO
ADEQUATE_TO_COMPLETE_ADL: NO
TOILETING: NEEDS ASSISTANCE
WALKS IN HOME: NEEDS ASSISTANCE
DRESSING YOURSELF: NEEDS ASSISTANCE
FEEDING YOURSELF: NEEDS ASSISTANCE
JUDGMENT_ADEQUATE_SAFELY_COMPLETE_DAILY_ACTIVITIES: NO
HEARING - LEFT EAR: DIFFICULTY WITH NOISE
BATHING: NEEDS ASSISTANCE
GROOMING: NEEDS ASSISTANCE
HEARING - RIGHT EAR: DIFFICULTY WITH NOISE

## 2025-06-24 ASSESSMENT — COGNITIVE AND FUNCTIONAL STATUS - GENERAL
DRESSING REGULAR UPPER BODY CLOTHING: A LITTLE
DRESSING REGULAR LOWER BODY CLOTHING: A LITTLE
DRESSING REGULAR LOWER BODY CLOTHING: A LITTLE
PERSONAL GROOMING: A LITTLE
MOVING TO AND FROM BED TO CHAIR: A LITTLE
HELP NEEDED FOR BATHING: A LITTLE
DAILY ACTIVITIY SCORE: 18
STANDING UP FROM CHAIR USING ARMS: A LITTLE
EATING MEALS: A LITTLE
MOVING TO AND FROM BED TO CHAIR: A LITTLE
TOILETING: A LITTLE
TURNING FROM BACK TO SIDE WHILE IN FLAT BAD: A LITTLE
MOVING FROM LYING ON BACK TO SITTING ON SIDE OF FLAT BED WITH BEDRAILS: A LITTLE
CLIMB 3 TO 5 STEPS WITH RAILING: A LITTLE
EATING MEALS: A LITTLE
WALKING IN HOSPITAL ROOM: A LITTLE
DRESSING REGULAR UPPER BODY CLOTHING: A LITTLE
PATIENT BASELINE BEDBOUND: NO
DAILY ACTIVITIY SCORE: 18
PERSONAL GROOMING: A LITTLE
HELP NEEDED FOR BATHING: A LITTLE
CLIMB 3 TO 5 STEPS WITH RAILING: A LITTLE
MOBILITY SCORE: 18
STANDING UP FROM CHAIR USING ARMS: A LITTLE
TOILETING: A LITTLE
TURNING FROM BACK TO SIDE WHILE IN FLAT BAD: A LITTLE
WALKING IN HOSPITAL ROOM: A LITTLE
MOBILITY SCORE: 18
MOVING FROM LYING ON BACK TO SITTING ON SIDE OF FLAT BED WITH BEDRAILS: A LITTLE

## 2025-06-24 ASSESSMENT — LIFESTYLE VARIABLES
SKIP TO QUESTIONS 9-10: 1
AUDIT-C TOTAL SCORE: 0
AUDIT-C TOTAL SCORE: 0
HOW OFTEN DO YOU HAVE A DRINK CONTAINING ALCOHOL: NEVER
HOW MANY STANDARD DRINKS CONTAINING ALCOHOL DO YOU HAVE ON A TYPICAL DAY: PATIENT DOES NOT DRINK
HOW OFTEN DO YOU HAVE 6 OR MORE DRINKS ON ONE OCCASION: NEVER

## 2025-06-24 ASSESSMENT — PATIENT HEALTH QUESTIONNAIRE - PHQ9
2. FEELING DOWN, DEPRESSED OR HOPELESS: NOT AT ALL
SUM OF ALL RESPONSES TO PHQ9 QUESTIONS 1 & 2: 0
1. LITTLE INTEREST OR PLEASURE IN DOING THINGS: NOT AT ALL

## 2025-06-24 NOTE — HOSPITAL COURSE
Cassidy Osorio is a 74 year old female with a past medical history of HTN, HLD, Afib on Eliquis, aortic stenosis (1/2025 TTE EF 65-70%), DVT/PE (2023) and DMT2 who presented to the [facility] ED on 5/22 with x10 days of L sided weakness/numbness and ataxia. MR/MRA with no vascular abnormality, R temporoparietal enhancement with sig edema. Patient admitted to the neurosurgery service 6/23 for further workup of noted intracranial mass.      6/24 CT CAP negative for neoplasm or metastatic disease. MRI PPF complete.   6/25 Perioperative medicine consulted for preoperative evaluation and recommended insulin regimen adjusted due to poor control.    6/27 BLE DVT US negative    PT/OT evaluated patient and recommended low level of continued therapy for which ***      On the day of discharge, the patient was seen and evaluated by the neurosurgery team and deemed suitable for discharge. The patient was given detailed discharge instructions and were scheduled to follow up as an outpatient.

## 2025-06-24 NOTE — PROGRESS NOTES
06/24/25 1058   Discharge Planning   Living Arrangements Spouse/significant other   Support Systems Spouse/significant other;Children   Type of Residence Private residence   Number of Stairs to Enter Residence 5   Number of Stairs Within Residence 0   Do you have animals or pets at home? No   Who is requesting discharge planning? Provider   Home or Post Acute Services In home services   Type of Home Care Services Home PT  ([facility])   Expected Discharge Disposition Home Health   Does the patient need discharge transport arranged? No     Met with patient/spouse and daughter to introduce myself, role and discuss discharge planning.  Patient lives with her spouse.  Patient is independent in all adl's.  Patient denies any recent falls.  Patient requires no assist devices for mobility.  Patient is active with [facility] for PT.  Patient does feel safe at home and denies any issues with making follow up appointments or getting her medications.    Patient/family have expressed no questions and no social work concerns.  Family will transport home.  Insurance:  [employer]  PCP:  Jeanne Colbert MD  Home Care:  [facility]  Medication:  [facility]  DME:  N/A  DM:  + Has supplies  HD:  N/A     No

## 2025-06-24 NOTE — PROGRESS NOTES
Pharmacy Medication History Review    Cassidy Osorio is a 74 y.o. female admitted for Brain lesion. Pharmacy reviewed the patient's [id number] medications and allergies for accuracy.    Medications ADDED:  None  Medications CHANGED:  Directions for metoprolol and metformin  Medications REMOVED:   None     Additional Comments:  Previous PTA med list collected at [facility] on 5/22/25 from Aga Siddiqui PharmD  Patient now gets Eliquis from [facility] using [employer]    Patient is out of their dexamethasone and escitalopram and would like refills    The list below reflects the updated PTA list.   Prior to Admission Medications   Prescriptions Last Dose Informant   apixaban (Eliquis) 5 mg tablet  Last filled 1/31/25 at [facility], but patient gets Eliquis from [facility] using [email or web address]    Self, Spouse/Significant Other, Child   Sig: Take 1 tablet (5 mg) by mouth 2 times a day.   dexAMETHasone (Decadron) 4 mg tablet  Self, Spouse/Significant Other, Child   Sig: Take 1 tablet (4 mg) by mouth every 12 hours.   escitalopram (Lexapro) 10 mg tablet  Self, Spouse/Significant Other, Child   Sig: Take 1 tablet (10 mg) by mouth once daily.   metFORMIN XR (Glumetza) 1,000 mg 24 hr tablet    Self, Spouse/Significant Other, Child   Sig: Take 1 tablet (1,000 mg) by mouth once daily in the evening. Take with meals. Do not crush, chew, or split.   Patient taking differently: Take 1 tablet (1,000 mg) by mouth 2 times daily (morning and late afternoon). Do not crush, chew, or split.  Last filled on 12/17/24 at [facility] as metformin  mg for a quantity of 360. Patient was taking 500 mg daily for several months but began taking 2 tablets (1000 mg) BID several weeks ago.   metoprolol tartrate (Lopressor) 50 mg tablet  Self, Spouse/Significant Other, Child   Sig: Take 1 tablet by mouth 2 times a day. Take half of a tablet two times per day   Patient taking differently: Take 1 tablet by  "mouth 2 times a day.   rosuvastatin (Crestor) 40 mg tablet  Self, Spouse/Significant Other, Child   Sig: Take 1 tablet (40 mg) by mouth once daily.      Facility-Administered Medications: None        The list below reflects the updated allergy list. Please review each documented allergy for additional clarification and justification.  Allergies  Reviewed by Elba Medellin on 6/24/2025   No Known Allergies     Patient's primary pharmacy:  [id number] [address]  P: [phone]    Patient declines M2B at discharge.     Sources:   [facility]  Pharmacy dispense history  Patient Interview Moderate historian  Spouse Zeferino Guillermo historian, Child Farida Merino Moderate historian  Chart Review     [facility] report below:      ELBA MEDELLIN  Pharmacy Intern  06/24/25     Secure Chat preferred   If no response call [phone] or [employer] \"Med Rec\"   "

## 2025-06-25 ENCOUNTER — APPOINTMENT (OUTPATIENT)
Dept: RADIOLOGY | Facility: HOSPITAL | Age: 75
End: 2025-06-25
Payer: COMMERCIAL

## 2025-06-25 LAB
ALBUMIN SERPL BCP-MCNC: 3.2 G/DL (ref 3.4–5)
ANION GAP SERPL CALC-SCNC: 10 MMOL/L (ref 10–20)
BUN SERPL-MCNC: 21 MG/DL (ref 6–23)
CALCIUM SERPL-MCNC: 8.5 MG/DL (ref 8.6–10.6)
CHLORIDE SERPL-SCNC: 101 MMOL/L (ref 98–107)
CO2 SERPL-SCNC: 24 MMOL/L (ref 21–32)
CREAT SERPL-MCNC: 0.49 MG/DL (ref 0.5–1.05)
EGFRCR SERPLBLD CKD-EPI 2021: >90 ML/MIN/1.73M*2
ERYTHROCYTE [DISTWIDTH] IN BLOOD BY AUTOMATED COUNT: 12.5 % (ref 11.5–14.5)
GLUCOSE BLD MANUAL STRIP-MCNC: 228 MG/DL (ref 74–99)
GLUCOSE BLD MANUAL STRIP-MCNC: 276 MG/DL (ref 74–99)
GLUCOSE BLD MANUAL STRIP-MCNC: 297 MG/DL (ref 74–99)
GLUCOSE BLD MANUAL STRIP-MCNC: 310 MG/DL (ref 74–99)
GLUCOSE SERPL-MCNC: 287 MG/DL (ref 74–99)
HCT VFR BLD AUTO: 42.8 % (ref 36–46)
HGB BLD-MCNC: 15 G/DL (ref 12–16)
MCH RBC QN AUTO: 30.4 PG (ref 26–34)
MCHC RBC AUTO-ENTMCNC: 35 G/DL (ref 32–36)
MCV RBC AUTO: 87 FL (ref 80–100)
NRBC BLD-RTO: 0 /100 WBCS (ref 0–0)
PHOSPHATE SERPL-MCNC: 2.1 MG/DL (ref 2.5–4.9)
PLATELET # BLD AUTO: 138 X10*3/UL (ref 150–450)
POTASSIUM SERPL-SCNC: 3.7 MMOL/L (ref 3.5–5.3)
RBC # BLD AUTO: 4.93 X10*6/UL (ref 4–5.2)
SODIUM SERPL-SCNC: 131 MMOL/L (ref 136–145)
WBC # BLD AUTO: 9.9 X10*3/UL (ref 4.4–11.3)

## 2025-06-25 PROCEDURE — 82947 ASSAY GLUCOSE BLOOD QUANT: CPT

## 2025-06-25 PROCEDURE — 85027 COMPLETE CBC AUTOMATED: CPT | Performed by: NURSE PRACTITIONER

## 2025-06-25 PROCEDURE — 2500000001 HC RX 250 WO HCPCS SELF ADMINISTERED DRUGS (ALT 637 FOR MEDICARE OP): Performed by: NURSE PRACTITIONER

## 2025-06-25 PROCEDURE — 2550000001 HC RX 255 CONTRASTS: Performed by: NEUROLOGICAL SURGERY

## 2025-06-25 PROCEDURE — 2500000001 HC RX 250 WO HCPCS SELF ADMINISTERED DRUGS (ALT 637 FOR MEDICARE OP)

## 2025-06-25 PROCEDURE — 2500000002 HC RX 250 W HCPCS SELF ADMINISTERED DRUGS (ALT 637 FOR MEDICARE OP, ALT 636 FOR OP/ED)

## 2025-06-25 PROCEDURE — 70553 MRI BRAIN STEM W/O & W/DYE: CPT | Performed by: RADIOLOGY

## 2025-06-25 PROCEDURE — 36415 COLL VENOUS BLD VENIPUNCTURE: CPT | Performed by: NURSE PRACTITIONER

## 2025-06-25 PROCEDURE — A9575 INJ GADOTERATE MEGLUMI 0.1ML: HCPCS | Performed by: NEUROLOGICAL SURGERY

## 2025-06-25 PROCEDURE — 2500000004 HC RX 250 GENERAL PHARMACY W/ HCPCS (ALT 636 FOR OP/ED): Performed by: NEUROLOGICAL SURGERY

## 2025-06-25 PROCEDURE — 70553 MRI BRAIN STEM W/O & W/DYE: CPT

## 2025-06-25 PROCEDURE — 1200000002 HC GENERAL ROOM WITH TELEMETRY DAILY

## 2025-06-25 PROCEDURE — 2500000004 HC RX 250 GENERAL PHARMACY W/ HCPCS (ALT 636 FOR OP/ED)

## 2025-06-25 PROCEDURE — 80069 RENAL FUNCTION PANEL: CPT | Performed by: NURSE PRACTITIONER

## 2025-06-25 PROCEDURE — 2500000004 HC RX 250 GENERAL PHARMACY W/ HCPCS (ALT 636 FOR OP/ED): Performed by: NURSE PRACTITIONER

## 2025-06-25 RX ORDER — LORAZEPAM 0.5 MG/1
0.5 TABLET ORAL ONCE
Status: COMPLETED | OUTPATIENT
Start: 2025-06-25 | End: 2025-06-25

## 2025-06-25 RX ORDER — GADOTERATE MEGLUMINE 376.9 MG/ML
20 INJECTION INTRAVENOUS
Status: COMPLETED | OUTPATIENT
Start: 2025-06-25 | End: 2025-06-25

## 2025-06-25 RX ORDER — SODIUM CHLORIDE 9 MG/ML
100 INJECTION, SOLUTION INTRAVENOUS CONTINUOUS
Status: DISCONTINUED | OUTPATIENT
Start: 2025-06-25 | End: 2025-06-25

## 2025-06-25 RX ORDER — PANTOPRAZOLE SODIUM 40 MG/1
40 TABLET, DELAYED RELEASE ORAL
Status: DISPENSED | OUTPATIENT
Start: 2025-06-26

## 2025-06-25 RX ADMIN — DEXAMETHASONE 4 MG: 4 TABLET ORAL at 21:21

## 2025-06-25 RX ADMIN — ROSUVASTATIN CALCIUM 40 MG: 20 TABLET, FILM COATED ORAL at 09:06

## 2025-06-25 RX ADMIN — METOPROLOL TARTRATE 50 MG: 50 TABLET, FILM COATED ORAL at 21:21

## 2025-06-25 RX ADMIN — ESCITALOPRAM OXALATE 10 MG: 10 TABLET ORAL at 09:06

## 2025-06-25 RX ADMIN — DIBASIC SODIUM PHOSPHATE, MONOBASIC POTASSIUM PHOSPHATE AND MONOBASIC SODIUM PHOSPHATE 250 MG: 852; 155; 130 TABLET ORAL at 16:16

## 2025-06-25 RX ADMIN — LORAZEPAM 0.5 MG: 0.5 TABLET ORAL at 11:29

## 2025-06-25 RX ADMIN — DIBASIC SODIUM PHOSPHATE, MONOBASIC POTASSIUM PHOSPHATE AND MONOBASIC SODIUM PHOSPHATE 250 MG: 852; 155; 130 TABLET ORAL at 14:16

## 2025-06-25 RX ADMIN — INSULIN LISPRO 9 UNITS: 100 INJECTION, SOLUTION INTRAVENOUS; SUBCUTANEOUS at 09:06

## 2025-06-25 RX ADMIN — GADOTERATE MEGLUMINE 20 ML: 376.9 INJECTION INTRAVENOUS at 12:52

## 2025-06-25 RX ADMIN — INSULIN LISPRO 6 UNITS: 100 INJECTION, SOLUTION INTRAVENOUS; SUBCUTANEOUS at 14:22

## 2025-06-25 RX ADMIN — HEPARIN SODIUM 5000 UNITS: 5000 INJECTION, SOLUTION INTRAVENOUS; SUBCUTANEOUS at 14:15

## 2025-06-25 RX ADMIN — SODIUM CHLORIDE 100 ML/HR: 9 INJECTION, SOLUTION INTRAVENOUS at 09:09

## 2025-06-25 RX ADMIN — SODIUM CHLORIDE 100 ML/HR: 9 INJECTION, SOLUTION INTRAVENOUS at 01:49

## 2025-06-25 RX ADMIN — DEXAMETHASONE 4 MG: 4 TABLET ORAL at 09:06

## 2025-06-25 RX ADMIN — METOPROLOL TARTRATE 50 MG: 50 TABLET, FILM COATED ORAL at 09:06

## 2025-06-25 RX ADMIN — HEPARIN SODIUM 5000 UNITS: 5000 INJECTION, SOLUTION INTRAVENOUS; SUBCUTANEOUS at 21:21

## 2025-06-25 RX ADMIN — SODIUM CHLORIDE 100 ML/HR: 9 INJECTION, SOLUTION INTRAVENOUS at 13:40

## 2025-06-25 RX ADMIN — INSULIN LISPRO 12 UNITS: 100 INJECTION, SOLUTION INTRAVENOUS; SUBCUTANEOUS at 18:10

## 2025-06-25 RX ADMIN — HEPARIN SODIUM 5000 UNITS: 5000 INJECTION, SOLUTION INTRAVENOUS; SUBCUTANEOUS at 05:11

## 2025-06-25 SDOH — SOCIAL STABILITY: SOCIAL INSECURITY: DOES ANYONE TRY TO KEEP YOU FROM HAVING/CONTACTING OTHER FRIENDS OR DOING THINGS OUTSIDE YOUR HOME?: NO

## 2025-06-25 SDOH — SOCIAL STABILITY: SOCIAL INSECURITY: HAS ANYONE EVER THREATENED TO HURT YOUR FAMILY OR YOUR PETS?: NO

## 2025-06-25 SDOH — SOCIAL STABILITY: SOCIAL INSECURITY: WERE YOU ABLE TO COMPLETE ALL THE BEHAVIORAL HEALTH SCREENINGS?: YES

## 2025-06-25 SDOH — SOCIAL STABILITY: SOCIAL INSECURITY: HAVE YOU HAD THOUGHTS OF HARMING ANYONE ELSE?: NO

## 2025-06-25 SDOH — SOCIAL STABILITY: SOCIAL INSECURITY: ABUSE: ADULT

## 2025-06-25 SDOH — SOCIAL STABILITY: SOCIAL INSECURITY: DO YOU FEEL UNSAFE GOING BACK TO THE PLACE WHERE YOU ARE LIVING?: NO

## 2025-06-25 SDOH — SOCIAL STABILITY: SOCIAL INSECURITY: HAVE YOU HAD ANY THOUGHTS OF HARMING ANYONE ELSE?: NO

## 2025-06-25 SDOH — SOCIAL STABILITY: SOCIAL INSECURITY: ARE THERE ANY APPARENT SIGNS OF INJURIES/BEHAVIORS THAT COULD BE RELATED TO ABUSE/NEGLECT?: NO

## 2025-06-25 SDOH — SOCIAL STABILITY: SOCIAL INSECURITY: DO YOU FEEL ANYONE HAS EXPLOITED OR TAKEN ADVANTAGE OF YOU FINANCIALLY OR OF YOUR PERSONAL PROPERTY?: NO

## 2025-06-25 SDOH — SOCIAL STABILITY: SOCIAL INSECURITY: ARE YOU OR HAVE YOU BEEN THREATENED OR ABUSED PHYSICALLY, EMOTIONALLY, OR SEXUALLY BY ANYONE?: NO

## 2025-06-25 ASSESSMENT — LIFESTYLE VARIABLES
HOW OFTEN DO YOU HAVE A DRINK CONTAINING ALCOHOL: NEVER
AUDIT-C TOTAL SCORE: 0
SKIP TO QUESTIONS 9-10: 1
AUDIT-C TOTAL SCORE: 0
PRESCIPTION_ABUSE_PAST_12_MONTHS: NO
HOW OFTEN DO YOU HAVE 6 OR MORE DRINKS ON ONE OCCASION: NEVER
SUBSTANCE_ABUSE_PAST_12_MONTHS: NO
HOW MANY STANDARD DRINKS CONTAINING ALCOHOL DO YOU HAVE ON A TYPICAL DAY: PATIENT DOES NOT DRINK

## 2025-06-25 ASSESSMENT — COGNITIVE AND FUNCTIONAL STATUS - GENERAL
TURNING FROM BACK TO SIDE WHILE IN FLAT BAD: A LITTLE
MOVING FROM LYING ON BACK TO SITTING ON SIDE OF FLAT BED WITH BEDRAILS: A LITTLE
HELP NEEDED FOR BATHING: A LITTLE
CLIMB 3 TO 5 STEPS WITH RAILING: A LOT
MOVING TO AND FROM BED TO CHAIR: A LITTLE
DRESSING REGULAR LOWER BODY CLOTHING: A LITTLE
STANDING UP FROM CHAIR USING ARMS: A LOT
PERSONAL GROOMING: A LOT
WALKING IN HOSPITAL ROOM: A LOT
DRESSING REGULAR UPPER BODY CLOTHING: A LITTLE
MOBILITY SCORE: 15
DAILY ACTIVITIY SCORE: 15
EATING MEALS: A LOT
TOILETING: A LOT

## 2025-06-25 ASSESSMENT — PAIN - FUNCTIONAL ASSESSMENT: PAIN_FUNCTIONAL_ASSESSMENT: 0-10

## 2025-06-25 ASSESSMENT — PAIN SCALES - GENERAL: PAINLEVEL_OUTOF10: 0 - NO PAIN

## 2025-06-25 NOTE — PROGRESS NOTES
Cassidy Osorio is a 74 y.o. female on day 2 of admission presenting with Brain lesion.    Subjective   NAEO       Objective     Physical Exam  Awake, A&Ox3  PEERL, EOMI, FS, hearing intact to finger rubs b/l  Naming 2/3, repetition intact    RUE 5/5  LUE 4+/5  LLE 4/5  RLE 5/5   SILT throughout all extremities    Last Recorded Vitals  Blood pressure 110/63, pulse 54, temperature 36 °C (96.8 °F), temperature source Temporal, resp. rate 17, SpO2 96%.  Intake/Output last 3 Shifts:  I/O last 3 completed shifts:  In: -   Out: 550 [Urine:550]    Relevant Results                     Results for orders placed or performed during the hospital encounter of 06/23/25 (from the past 24 hours)   POCT GLUCOSE   Result Value Ref Range    POCT Glucose 300 (H) 74 - 99 mg/dL   Renal Function Panel   Result Value Ref Range    Glucose 348 (H) 74 - 99 mg/dL    Sodium 129 (L) 136 - 145 mmol/L    Potassium 3.1 (L) 3.5 - 5.3 mmol/L    Chloride 97 (L) 98 - 107 mmol/L    Bicarbonate 22 21 - 32 mmol/L    Anion Gap 13 10 - 20 mmol/L    Urea Nitrogen 29 (H) 6 - 23 mg/dL    Creatinine 0.45 (L) 0.50 - 1.05 mg/dL    eGFR >90 >60 mL/min/1.73m*2    Calcium 8.6 8.6 - 10.6 mg/dL    Phosphorus 2.3 (L) 2.5 - 4.9 mg/dL    Albumin 3.2 (L) 3.4 - 5.0 g/dL   CBC   Result Value Ref Range    WBC 9.5 4.4 - 11.3 x10*3/uL    nRBC 0.0 0.0 - 0.0 /100 WBCs    RBC 4.78 4.00 - 5.20 x10*6/uL    Hemoglobin 14.5 12.0 - 16.0 g/dL    Hematocrit 40.7 36.0 - 46.0 %    MCV 85 80 - 100 fL    MCH 30.3 26.0 - 34.0 pg    MCHC 35.6 32.0 - 36.0 g/dL    RDW 12.5 11.5 - 14.5 %    Platelets 134 (L) 150 - 450 x10*3/uL   Coagulation Screen   Result Value Ref Range    Protime 11.8 9.8 - 12.4 seconds    INR 1.1 0.9 - 1.1    aPTT 22 (L) 26 - 36 seconds   Type And Screen   Result Value Ref Range    ABO TYPE O     Rh TYPE NEG     ANTIBODY SCREEN NEG    POCT GLUCOSE   Result Value Ref Range    POCT Glucose 308 (H) 74 - 99 mg/dL   Urinalysis with Reflex Microscopic   Result Value Ref Range     Color, Urine Light-Yellow Light-Yellow, Yellow, Dark-Yellow    Appearance, Urine Clear Clear    Specific Gravity, Urine 1.025 1.005 - 1.035    pH, Urine 6.5 5.0, 5.5, 6.0, 6.5, 7.0, 7.5, 8.0    Protein, Urine 30 (1+) (A) NEGATIVE, 10 (TRACE), 20 (TRACE) mg/dL    Glucose, Urine OVER (4+) (A) Normal mg/dL    Blood, Urine 0.03 (TRACE) (A) NEGATIVE mg/dL    Ketones, Urine TRACE (A) NEGATIVE mg/dL    Bilirubin, Urine NEGATIVE NEGATIVE mg/dL    Urobilinogen, Urine Normal Normal mg/dL    Nitrite, Urine NEGATIVE NEGATIVE    Leukocyte Esterase, Urine NEGATIVE NEGATIVE   Microscopic Only, Urine   Result Value Ref Range    WBC, Urine 1-5 1-5, NONE /HPF    RBC, Urine 1-2 NONE, 1-2, 3-5 /HPF    Mucus, Urine FEW Reference range not established. /LPF   POCT GLUCOSE   Result Value Ref Range    POCT Glucose 226 (H) 74 - 99 mg/dL   POCT GLUCOSE   Result Value Ref Range    POCT Glucose 304 (H) 74 - 99 mg/dL   POCT GLUCOSE   Result Value Ref Range    POCT Glucose 274 (H) 74 - 99 mg/dL              Assessment & Plan  Brain lesion    Cassidy Osorio is a 74 y.o. female h/o HTN, HLD, Afib on Eliquis, aortic stenosis (1/2025 TTE EF 65-70%), DVT/PE (2023), DMT2, presented to [facility] ED on 5/22 x10d of L sided weakness/numbness and ataxia, MR/MRA no vascular abnormality, R temporoparietal enhancement w sig edema, 6/23 p/w workup for intracranial mass     Plan:  - floor  - OR planning for R brain bx vs resection wk of 6/30  - MRI PPF  - Sidagam c/s this AM  - Hold Eliquis   - will follow up CT CAP final read  - SCDs, SQ  - PTOT           Alec Carl MD

## 2025-06-25 NOTE — CARE PLAN
The patient's goals for the shift include      The clinical goals for the shift include pt will remain HDS      Problem: Pain - Adult  Goal: Verbalizes/displays adequate comfort level or baseline comfort level  Outcome: Progressing     Problem: Safety - Adult  Goal: Free from fall injury  Outcome: Progressing     Problem: Discharge Planning  Goal: Discharge to home or other facility with appropriate resources  Outcome: Progressing     Problem: Chronic Conditions and Co-morbidities  Goal: Patient's chronic conditions and co-morbidity symptoms are monitored and maintained or improved  Outcome: Progressing     Problem: Fall/Injury  Goal: Not fall by end of shift  Outcome: Progressing  Goal: Be free from injury by end of the shift  Outcome: Progressing  Goal: Verbalize understanding of personal risk factors for fall in the hospital  Outcome: Progressing  Goal: Verbalize understanding of risk factor reduction measures to prevent injury from fall in the home  Outcome: Progressing  Goal: Use assistive devices by end of the shift  Outcome: Progressing  Goal: Pace activities to prevent fatigue by end of the shift  Outcome: Progressing

## 2025-06-25 NOTE — PROGRESS NOTES
Occupational Therapy                 Therapy Communication Note    Patient Name: Cassidy Osorio  MRN: [id number]  Department: [facility]  Room: [facility]9/3019-A  Today's Date: 6/25/2025     Discipline: Occupational Therapy    Missed Visit: OT Missed Visit: Yes     Missed Visit Reason: Missed Visit Reason: Patient in a medical procedure    Missed Time: Attempt    Comment:

## 2025-06-25 NOTE — PROGRESS NOTES
06/25/25 0909   Discharge Planning   Living Arrangements Spouse/significant other   Support Systems Spouse/significant other;Children   Type of Residence Private residence   Who is requesting discharge planning? Provider   Expected Discharge Disposition Home     Patient admitted with a ntracranial mass.  Plan for OR for biopsy vs resection, some time next week.  doing pre-op workup and clearance.  Will continue to monitor patient patient for additional home going needs.

## 2025-06-25 NOTE — PROGRESS NOTES
Communication Note    Patient Name: Cassidy Osorio  MRN: [id number]  Today's Date: 6/25/2025   Room: [address] [place], [place] [address]    Discipline: Physical Therapy      PT Missed Visit: Yes  Missed Visit Reason:  (PT evaluation reviewed, patient currently off unit for MRI.  Will  follow up as appropriate.)      06/25/25 at 1:59 PM   Angelito Conroy, PT   Rehab Office: [phone]

## 2025-06-26 LAB
ALBUMIN SERPL BCP-MCNC: 2.9 G/DL (ref 3.4–5)
ANION GAP SERPL CALC-SCNC: 11 MMOL/L (ref 10–20)
BUN SERPL-MCNC: 23 MG/DL (ref 6–23)
CALCIUM SERPL-MCNC: 8.3 MG/DL (ref 8.6–10.6)
CHLORIDE SERPL-SCNC: 102 MMOL/L (ref 98–107)
CHLORIDE UR-SCNC: 133 MMOL/L
CHLORIDE/CREATININE (MMOL/G) IN URINE: 323 MMOL/G CREAT (ref 38–318)
CO2 SERPL-SCNC: 22 MMOL/L (ref 21–32)
CREAT SERPL-MCNC: 0.37 MG/DL (ref 0.5–1.05)
CREAT UR-MCNC: 41.2 MG/DL (ref 20–320)
EGFRCR SERPLBLD CKD-EPI 2021: >90 ML/MIN/1.73M*2
GLUCOSE BLD MANUAL STRIP-MCNC: 179 MG/DL (ref 74–99)
GLUCOSE BLD MANUAL STRIP-MCNC: 223 MG/DL (ref 74–99)
GLUCOSE BLD MANUAL STRIP-MCNC: 235 MG/DL (ref 74–99)
GLUCOSE BLD MANUAL STRIP-MCNC: 288 MG/DL (ref 74–99)
GLUCOSE SERPL-MCNC: 223 MG/DL (ref 74–99)
PHOSPHATE SERPL-MCNC: 2.8 MG/DL (ref 2.5–4.9)
POTASSIUM SERPL-SCNC: 3.5 MMOL/L (ref 3.5–5.3)
POTASSIUM UR-SCNC: 32 MMOL/L
POTASSIUM/CREAT UR-RTO: 78 MMOL/G CREAT
SODIUM SERPL-SCNC: 131 MMOL/L (ref 136–145)
SODIUM UR-SCNC: 118 MMOL/L
SODIUM/CREAT UR-RTO: 286 MMOL/G CREAT

## 2025-06-26 PROCEDURE — 2500000002 HC RX 250 W HCPCS SELF ADMINISTERED DRUGS (ALT 637 FOR MEDICARE OP, ALT 636 FOR OP/ED)

## 2025-06-26 PROCEDURE — 80069 RENAL FUNCTION PANEL: CPT | Performed by: NURSE PRACTITIONER

## 2025-06-26 PROCEDURE — 82570 ASSAY OF URINE CREATININE: CPT | Performed by: ANESTHESIOLOGY

## 2025-06-26 PROCEDURE — 2500000001 HC RX 250 WO HCPCS SELF ADMINISTERED DRUGS (ALT 637 FOR MEDICARE OP): Performed by: NURSE PRACTITIONER

## 2025-06-26 PROCEDURE — 2500000004 HC RX 250 GENERAL PHARMACY W/ HCPCS (ALT 636 FOR OP/ED): Performed by: NEUROLOGICAL SURGERY

## 2025-06-26 PROCEDURE — 2500000001 HC RX 250 WO HCPCS SELF ADMINISTERED DRUGS (ALT 637 FOR MEDICARE OP)

## 2025-06-26 PROCEDURE — 97161 PT EVAL LOW COMPLEX 20 MIN: CPT | Mod: GP | Performed by: STUDENT IN AN ORGANIZED HEALTH CARE EDUCATION/TRAINING PROGRAM

## 2025-06-26 PROCEDURE — 99223 1ST HOSP IP/OBS HIGH 75: CPT

## 2025-06-26 PROCEDURE — 84133 ASSAY OF URINE POTASSIUM: CPT | Performed by: ANESTHESIOLOGY

## 2025-06-26 PROCEDURE — 82947 ASSAY GLUCOSE BLOOD QUANT: CPT

## 2025-06-26 PROCEDURE — 2500000002 HC RX 250 W HCPCS SELF ADMINISTERED DRUGS (ALT 637 FOR MEDICARE OP, ALT 636 FOR OP/ED): Performed by: ANESTHESIOLOGY

## 2025-06-26 PROCEDURE — 2500000001 HC RX 250 WO HCPCS SELF ADMINISTERED DRUGS (ALT 637 FOR MEDICARE OP): Performed by: PHYSICIAN ASSISTANT

## 2025-06-26 PROCEDURE — 36415 COLL VENOUS BLD VENIPUNCTURE: CPT | Performed by: NURSE PRACTITIONER

## 2025-06-26 PROCEDURE — 1200000002 HC GENERAL ROOM WITH TELEMETRY DAILY

## 2025-06-26 PROCEDURE — 97165 OT EVAL LOW COMPLEX 30 MIN: CPT | Mod: GO

## 2025-06-26 PROCEDURE — 2500000002 HC RX 250 W HCPCS SELF ADMINISTERED DRUGS (ALT 637 FOR MEDICARE OP, ALT 636 FOR OP/ED): Performed by: PHYSICIAN ASSISTANT

## 2025-06-26 PROCEDURE — 2500000004 HC RX 250 GENERAL PHARMACY W/ HCPCS (ALT 636 FOR OP/ED)

## 2025-06-26 RX ORDER — INSULIN GLARGINE 100 [IU]/ML
14 INJECTION, SOLUTION SUBCUTANEOUS EVERY 24 HOURS
Status: DISPENSED | OUTPATIENT
Start: 2025-06-26

## 2025-06-26 RX ORDER — AMOXICILLIN 250 MG
1 CAPSULE ORAL NIGHTLY
Status: DISPENSED | OUTPATIENT
Start: 2025-06-26

## 2025-06-26 RX ORDER — INSULIN LISPRO 100 [IU]/ML
5 INJECTION, SOLUTION INTRAVENOUS; SUBCUTANEOUS
Status: ACTIVE | OUTPATIENT
Start: 2025-06-26

## 2025-06-26 RX ADMIN — INSULIN LISPRO 9 UNITS: 100 INJECTION, SOLUTION INTRAVENOUS; SUBCUTANEOUS at 13:45

## 2025-06-26 RX ADMIN — INSULIN GLARGINE 14 UNITS: 100 INJECTION, SOLUTION SUBCUTANEOUS at 09:40

## 2025-06-26 RX ADMIN — INSULIN LISPRO 5 UNITS: 100 INJECTION, SOLUTION INTRAVENOUS; SUBCUTANEOUS at 13:44

## 2025-06-26 RX ADMIN — PANTOPRAZOLE SODIUM 40 MG: 20 TABLET, DELAYED RELEASE ORAL at 06:06

## 2025-06-26 RX ADMIN — DEXAMETHASONE 4 MG: 4 TABLET ORAL at 08:25

## 2025-06-26 RX ADMIN — HEPARIN SODIUM 5000 UNITS: 5000 INJECTION, SOLUTION INTRAVENOUS; SUBCUTANEOUS at 21:25

## 2025-06-26 RX ADMIN — HEPARIN SODIUM 5000 UNITS: 5000 INJECTION, SOLUTION INTRAVENOUS; SUBCUTANEOUS at 06:06

## 2025-06-26 RX ADMIN — ROSUVASTATIN CALCIUM 40 MG: 20 TABLET, FILM COATED ORAL at 08:25

## 2025-06-26 RX ADMIN — ESCITALOPRAM OXALATE 10 MG: 10 TABLET ORAL at 08:25

## 2025-06-26 RX ADMIN — INSULIN LISPRO 5 UNITS: 100 INJECTION, SOLUTION INTRAVENOUS; SUBCUTANEOUS at 08:26

## 2025-06-26 RX ADMIN — DEXAMETHASONE 4 MG: 4 TABLET ORAL at 21:25

## 2025-06-26 RX ADMIN — METOPROLOL TARTRATE 50 MG: 50 TABLET, FILM COATED ORAL at 21:25

## 2025-06-26 RX ADMIN — INSULIN LISPRO 6 UNITS: 100 INJECTION, SOLUTION INTRAVENOUS; SUBCUTANEOUS at 08:27

## 2025-06-26 RX ADMIN — INSULIN LISPRO 6 UNITS: 100 INJECTION, SOLUTION INTRAVENOUS; SUBCUTANEOUS at 17:21

## 2025-06-26 RX ADMIN — HEPARIN SODIUM 5000 UNITS: 5000 INJECTION, SOLUTION INTRAVENOUS; SUBCUTANEOUS at 13:46

## 2025-06-26 RX ADMIN — INSULIN LISPRO 5 UNITS: 100 INJECTION, SOLUTION INTRAVENOUS; SUBCUTANEOUS at 17:21

## 2025-06-26 ASSESSMENT — COGNITIVE AND FUNCTIONAL STATUS - GENERAL
TURNING FROM BACK TO SIDE WHILE IN FLAT BAD: A LITTLE
DRESSING REGULAR LOWER BODY CLOTHING: A LITTLE
WALKING IN HOSPITAL ROOM: A LOT
PERSONAL GROOMING: A LOT
HELP NEEDED FOR BATHING: A LITTLE
MOVING TO AND FROM BED TO CHAIR: A LITTLE
DRESSING REGULAR UPPER BODY CLOTHING: A LOT
TOILETING: A LOT
PERSONAL GROOMING: A LITTLE
HELP NEEDED FOR BATHING: A LOT
DAILY ACTIVITIY SCORE: 15
MOVING FROM LYING ON BACK TO SITTING ON SIDE OF FLAT BED WITH BEDRAILS: A LITTLE
HELP NEEDED FOR BATHING: A LITTLE
DRESSING REGULAR UPPER BODY CLOTHING: A LITTLE
CLIMB 3 TO 5 STEPS WITH RAILING: TOTAL
MOVING FROM LYING ON BACK TO SITTING ON SIDE OF FLAT BED WITH BEDRAILS: A LITTLE
MOBILITY SCORE: 14
TOILETING: A LOT
DAILY ACTIVITIY SCORE: 15
WALKING IN HOSPITAL ROOM: A LOT
DRESSING REGULAR LOWER BODY CLOTHING: A LITTLE
DRESSING REGULAR UPPER BODY CLOTHING: A LITTLE
EATING MEALS: A LOT
TURNING FROM BACK TO SIDE WHILE IN FLAT BAD: A LITTLE
MOVING TO AND FROM BED TO CHAIR: A LITTLE
STANDING UP FROM CHAIR USING ARMS: A LOT
DRESSING REGULAR LOWER BODY CLOTHING: A LOT
CLIMB 3 TO 5 STEPS WITH RAILING: A LOT
TOILETING: A LOT
TURNING FROM BACK TO SIDE WHILE IN FLAT BAD: A LITTLE
MOBILITY SCORE: 15
DAILY ACTIVITIY SCORE: 15
MOVING FROM LYING ON BACK TO SITTING ON SIDE OF FLAT BED WITH BEDRAILS: A LITTLE
STANDING UP FROM CHAIR USING ARMS: A LOT
EATING MEALS: A LOT
PERSONAL GROOMING: A LOT
MOBILITY SCORE: 14
WALKING IN HOSPITAL ROOM: A LITTLE
MOVING TO AND FROM BED TO CHAIR: A LOT
CLIMB 3 TO 5 STEPS WITH RAILING: TOTAL
STANDING UP FROM CHAIR USING ARMS: A LOT

## 2025-06-26 ASSESSMENT — PAIN SCALES - GENERAL
PAINLEVEL_OUTOF10: 0 - NO PAIN

## 2025-06-26 ASSESSMENT — PAIN - FUNCTIONAL ASSESSMENT
PAIN_FUNCTIONAL_ASSESSMENT: 0-10

## 2025-06-26 ASSESSMENT — ACTIVITIES OF DAILY LIVING (ADL)
ADL_ASSISTANCE: INDEPENDENT
BATHING_ASSISTANCE: MODERATE

## 2025-06-26 NOTE — H&P
History Of Present Illness  Cassidy Osorio is a 74 y.o. female with PMH of HTN, HLD, Afib on Eliquis (currently NSR), aortic stenosis (1/2025 TTE EF 65-70%), DVT/PE (2023), DMT2, presented inially to [facility] ED on 5/22 x10d of L sided weakness/numbness and ataxia, MR/MRA no vascular abnormality, R temporoparietal enhancement w sig edema with a debate of whether its a stroke vs a mass. On 6/23 p/w more deterioration in her functional status and workup for intracranial mass. Per her  the patient has been using cane since her ankle surgery on January but lately she has been on wheelchair because of her weakness. She had nuclear stress test on 2/2023 that didn't show any evidence of ischemia and she was seen this January by her cardiologist who mentioned that her heart function has been stable. Perioperative Medicine got consulted for pre-op eval and clearance.     Pt was lying comfortably on the bed. Pt denies chest pain, worsening of SOB, fever, chills, dizziness, headaches, abdominal pain, changes in the bowel habits.       Past Medical History  Medical History[1]    Surgical History  Surgical History[2]     Social History  She reports that she has never smoked. She has never used smokeless tobacco. She reports that she does not drink alcohol and does not use drugs.    Family History  Family History[3]     Allergies  Patient has no known allergies.    Review of Systems     Physical Exam  Constitutional:       Appearance: Normal appearance.   HENT:      Head: Normocephalic and atraumatic.   Eyes:      Extraocular Movements: Extraocular movements intact.      Pupils: Pupils are equal, round, and reactive to light.   Cardiovascular:      Rate and Rhythm: Normal rate and regular rhythm.      Heart sounds: Murmur heard.   Pulmonary:      Effort: Pulmonary effort is normal.      Breath sounds: Normal breath sounds.   Musculoskeletal:      Cervical back: Normal range of motion and neck supple.      Right lower leg:  Edema present.      Left lower leg: Edema present.   Skin:     Capillary Refill: Capillary refill takes less than 2 seconds.   Neurological:      Mental Status: She is alert and oriented to person, place, and time.      Motor: Weakness present.      Comments: More significant weakness in LUE and LLE    Psychiatric:         Mood and Affect: Mood normal.         Behavior: Behavior normal.          Last Recorded Vitals  Blood pressure (!) 169/92, pulse 50, temperature 36.4 °C (97.5 °F), temperature source Temporal, resp. rate 18, SpO2 92%.    Relevant Results      Results for orders placed or performed during the hospital encounter of 06/23/25 (from the past 24 hours)   POCT GLUCOSE   Result Value Ref Range    POCT Glucose 228 (H) 74 - 99 mg/dL   POCT GLUCOSE   Result Value Ref Range    POCT Glucose 310 (H) 74 - 99 mg/dL   POCT GLUCOSE   Result Value Ref Range    POCT Glucose 297 (H) 74 - 99 mg/dL   Renal Function Panel   Result Value Ref Range    Glucose 223 (H) 74 - 99 mg/dL    Sodium 131 (L) 136 - 145 mmol/L    Potassium 3.5 3.5 - 5.3 mmol/L    Chloride 102 98 - 107 mmol/L    Bicarbonate 22 21 - 32 mmol/L    Anion Gap 11 10 - 20 mmol/L    Urea Nitrogen 23 6 - 23 mg/dL    Creatinine 0.37 (L) 0.50 - 1.05 mg/dL    eGFR >90 >60 mL/min/1.73m*2    Calcium 8.3 (L) 8.6 - 10.6 mg/dL    Phosphorus 2.8 2.5 - 4.9 mg/dL    Albumin 2.9 (L) 3.4 - 5.0 g/dL   POCT GLUCOSE   Result Value Ref Range    POCT Glucose 223 (H) 74 - 99 mg/dL   POCT GLUCOSE   Result Value Ref Range    POCT Glucose 288 (H) 74 - 99 mg/dL       TTE 01/2025:   LVEF 65%, Moderate Aortic stenosis, Mild AR, Mild MR, Moderate TR, Moderate PHTN      Assessment & Plan  Brain lesion  Cassidy Osorio is a 74 y.o. female with PMH of HTN, HLD, Afib on Eliquis (currently NSR), aortic stenosis (1/2025 TTE EF 65-70%), DVT/PE (2023), DMT2, presented inially to [facility] ED on 5/22 x10d of L sided weakness/numbness and ataxia, MR/MRA no vascular abnormality, R temporoparietal  enhancement w sig edema most likely representing intracranial mass.     Procedure: Rt intracranial biopsy   Timing: Time sensitive     -  Has no major predictors that require urgent management and delay of the procedure   - Functional capacity: Poor < 4 METS   - RCRI for non-cardiac surgery: 1  - Hx of PE   - No personal or family h/o significant anesthesia complications  - No h/o excessive bleeding with prior procedures  - Labs reviewed  - ECG reviewed, NSR      - The patient has no current evidence of acute coronary syndrome, acute decompensated heart failure, life-threatening arrhythmias, or severe valvular disease. However, she does have moderate aortic stenosis and a history of atrial fibrillation, which may increase her perioperative risk. Notably, there are no signs of fluid overload, as her recent CT scan showed no pleural effusion, and both her BNP and echocardiogram findings, along with the clinical exam, do not suggest volume overload     - The pt is at an acceptable risk to proceed with the planned procedure without additional cardiac testing.        Recommendations:   - B/L LE Duplex  to rulr out DVT because of the edema and she's has been immobile for a while.   - Please make sure the patient takes Metoprolol on the day of the surgery  - insulin regimen was initiated for the patient to optimize glycemic control  - Resume Eliquis when deemed appropriate by the surgical team postop  - The surgical team could proceed with the procedure as planned after the duplex is unremarkable  - Perioperative VTE prophylaxis as deemed appropriate by the surgical service.  - T&S   - Ensure tetanus prophylaxis is up to date, especially in the context of a fracture.  - Pain & N\V management per the primary team .  - Administration of prophylactic antibiotics per the surgical team.  - Postoperative care plan including physical therapy for rehabilitation after the procedure.        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

## 2025-06-26 NOTE — PROGRESS NOTES
Physical Therapy    Physical Therapy Evaluation    Patient Name: Cassidy Osorio  MRN: [id number]  Room: [facility]-A  Today's Date: 6/26/2025   Time Calculation  Start Time: 0941  Stop Time: 1008  Time Calculation (min): 27 min    Assessment/Plan   PT Assessment  PT Assessment Results: Decreased strength, Decreased endurance, Impaired balance, Decreased mobility, Decreased coordination  Rehab Prognosis: Good  Barriers to Discharge Home: Physical needs  Physical Needs: High falls risk due to function or environment, Intermittent ADL assistance needed, Intermittent mobility assistance needed, Stair navigation into home limited by function/safety, Ambulating household distances limited by function/safety  End of Session Communication: Bedside nurse  End of Session Patient Position: Up in chair, Alarm off, not on at start of session  IP OR SWING BED PT PLAN  Inpatient or Swing Bed: Inpatient  Treatment/Interventions: Transfer training, Gait training, Stair training, Balance training, Endurance training, Therapeutic exercise, Therapeutic activity  PT Plan: Ongoing PT  PT Frequency: 5 times per week  PT Discharge Recommendations: Low intensity level of continued care  PT Recommended Transfer Status: Assist x1, Assistive device    Subjective      General Visit Information:  Subjective: Patient alert and agreeable to PT. Patient's  and daughter present and very supportive throughout session.  Reason for Referral: day 3 of admission presenting with Brain lesion.  Past Medical History Relevant to Rehab: past medical history of Difficulty urinating (01/14/2025), History of left knee replacement (01/30/2024), Influenza (04/28/2023), PE (pulmonary thromboembolism) (Multi) (02/13/2023), Personal history of other diseases of the circulatory system, Personal history of other diseases of the respiratory system (06/05/2018), Personal history of other endocrine, nutritional and metabolic disease (08/22/2016), Tibialis posterior  dysfunction (01/15/2025), and Tibialis posterior tendon rupture, right, sequela (01/29/2025).  Prior to Session Communication: Bedside nurse  Patient Position Received: Bed, 3 rail up, Alarm off, not on at start of session  Family/Caregiver Present: Yes  Caregiver Feedback: / daughter   Home Living:  Home Living  Type of Home: House  Lives With: Spouse  Home Adaptive Equipment: Walker rolling or standard  Home Layout: Two level  Home Access: Stairs to enter with rails  Entrance Stairs-Rails: Both  Entrance Stairs-Number of Steps: 5  Home Living Comments:  and daughter able to help around home  Prior Level of Function:  Prior Function Per Pt/Caregiver Report  Level of [place]: Independent with ADLs and functional transfers, Independent with homemaking with ambulation  Receives Help From: Family  ADL Assistance: Independent  Homemaking Assistance: Independent  Ambulatory Assistance: Independent  Prior Function Comments: - falls, require increase assistance a couple of weeks prior to admission, was max assist  Precautions:  Precautions  Medical Precautions: Fall precautions  Vital Signs:  Pre Vitals  Vital Signs  Heart Rate: 50  SpO2: 92 %  BP: (!) 169/92  Vital Signs Comment: Patient vitals stable throughout session   Post Vitals      Lines/Tubes/Drains:        Continuous Medications/Drips:  Continuous Medications[1]    Objective   Pain:  Pain Assessment  Pain Assessment: 0-10  0-10 (Numeric) Pain Score: 0 - No pain (Post-0)    Cognition:  Cognition  Orientation Level: Oriented X4    General Assessments:  Extremity/Trunk Assessments:  Tone: No abnormalities noted  Sensation  Light Touch: No apparent deficits  Coordination  Finger to Nose: Bradykinesia  Rapid Alternating Movements: Bradykinesia  Heel to Vazquez: Dysmetria  Finger to Target: Dysmetria  Upper Extremity  ROM: WFL  Strength: R GH flexion 4/5, L GH 3/5  Lower Extremity  ROM: WFL  Strength: R Hip flexion 4/5, L Hip flexion 3/5, R knee flexion  4/5, L knee flexion 3/5, L DF 3/5, R DF 4/5, R PF 4/5, L PF 4/5  Sitting Static Balance Normal  Sitting Dynamic Balance Normal  Standing Static Balance Not NormalUE Support Two UE support and Assist Level Contact Guard  Standing Dynamic Balance Not NormalUE Support Two UE support and Assist Level Min Assist    Functional Assessments:  Bed Mobility  Bed Mobility: Yes  Bed Mobility 1  Bed Mobility 1: Supine to sitting  Level of Assistance 1: Contact guard  Bed Mobility Comments 1: HOB Elevated, increased time for mobility    Transfers  Transfer: Yes  Transfer 1  Transfer From 1: Bed to  Transfer to 1: Chair with arms  Transfer Device 1: Walker  Transfer Level of Assistance 1: Moderate assistance  Transfers 2  Transfer From 2: Sit to  Transfer to 2: Stand  Transfer Device 2: Walker  Transfer Level of Assistance 2: Moderate assistance  Transfers 3  Transfer From 3: Stand to  Transfer to 3: Sit  Transfer Device 3: Walker  Transfer Level of Assistance 3: Moderate assistance    Ambulation/Gait Training  Ambulation/Gait Training Performed: Yes  Ambulation/Gait Training 1  Surface 1: Level tile  Device 1: Rolling walker  Assistance 1: Minimum assistance  Quality of Gait 1: Forward flexed posture, Decreased step length, Narrow base of support (decreased cadance)  Comments/Distance (ft) 1: 20 ft    Stairs  Stairs: No         Outcome Measures:  [facility] Basic Mobility  Turning from your back to your side while in a flat bed without using bedrails: A little  Moving from lying on your back to sitting on the side of a flat bed without using bedrails: A little  Moving to and from bed to chair (including a wheelchair): A lot  Standing up from a chair using your arms (e.g. wheelchair or bedside chair): A lot  To walk in hospital room: A little  Climbing 3-5 steps with railing: Total  Basic Mobility - Total Score: 14                            Encounter Problems       Encounter Problems (Active)       PT Problem       Patient will  maintain Static standing with supervision        Start:  06/26/25    Expected End:  07/10/25            Patient will perform sit<>stand transfer with LRAD, and Supervision        Start:  06/26/25    Expected End:  07/10/25            Patient will ambulate >70' with LRAD and Supervision        Start:  06/26/25    Expected End:  07/10/25            Patient will ascend/descend 5 steps with Bilateral Rails and supervision        Start:  06/26/25    Expected End:  07/10/25               Pain - Adult            Assessment: Pt is a 74 y.o. day 3 of admission presenting with Brain lesion. Patient is moderate assist in all functional capacties with stable vitals. Patient's main impairments are LUE/LE coordination. Patient would benefit from further therapy to increase functional independence and safety.  Will continue to follow during acute stay.      Education Documentation  No documentation found.  Education Comments  No comments found.          06/26/25 at 3:40 PM   AARTI CEDILLOPT   Rehab Office: [phone]                 [1]

## 2025-06-26 NOTE — PROGRESS NOTES
Occupational Therapy    Evaluation    Patient Name: Cassidy Osorio  MRN: [id number]  Department: [place] [id number]  Room: [address] [place] [address]  Today's Date: 6/26/2025  Time Calculation  Start Time: 1430  Stop Time: 1446  Time Calculation (min): 16 min    Assessment  IP OT Assessment  OT Assessment: difficulty I/ADLS, safety, fxnl mob  Prognosis: Good  Barriers to Discharge Home: Physical needs  Physical Needs: 24hr mobility assistance needed, 24hr ADL assistance needed, High falls risk due to function or environment  Evaluation/Treatment Tolerance: Patient tolerated treatment well  Medical Staff Made Aware: Yes  End of Session Communication: Bedside nurse  End of Session Patient Position: Up in chair, Alarm off, not on at start of session, Alarm off, caregiver present  Plan:  Treatment Interventions: ADL retraining, Functional transfer training, UE strengthening/ROM, Endurance training, Patient/family training, Cognitive reorientation, Equipment evaluation/education, Compensatory technique education  OT Frequency: 2 times per week  OT Discharge Recommendations: Low intensity level of continued care, 24 hr supervision due to cognition (family reports has A as needed and wanting homecare again post dc, pending ? OR 6/30)  Equipment Recommended upon Discharge:  (hip kit, BSC)  OT Recommended Transfer Status: Assist of 2  OT - OK to Discharge: Yes    Subjective   Current Problem:  1. Brain lesion        2. Generalized edema  Vascular US lower extremity venous duplex bilateral    Vascular US lower extremity venous duplex bilateral        OT Visit Info:  OT Received On: 06/26/25  General Visit Info:  General  Reason for Referral: brain lesion  Past Medical History Relevant to Rehab: HTN, HLD, Afib on Eliquis, aortic stenosis (1/2025 TTE EF 65-70%), DVT/PE (2023), DMT2, presented to [facility] ED on 5/22 x10d of L sided weakness/numbness and ataxia, MR/MRA no vascular abnormality, R temporoparietal enhancement w sig edema, 6/23 p/w workup for  intracranial mass  Family/Caregiver Present: Yes  Caregiver Feedback: / daughter  Prior to Session Communication: Bedside nurse  Patient Position Received: Up in chair, Alarm off, caregiver present  General Comment: family reports usually wheelchai pt to bathrom and pivot her over, had homecare prior to admission wants to continue that  Precautions:  Medical Precautions: Fall precautions           Pain:  Pain Assessment  Pain Assessment: 0-10  0-10 (Numeric) Pain Score: 0 - No pain    Objective   Cognition:  Overall Cognitive Status: Within Functional Limits  Orientation Level: Oriented X4  Insight: Mild  Impulsive: Mildly           Home Living:  Type of Home: House  Lives With: Spouse  Home Adaptive Equipment: Walker rolling or standard, Wheelchair-manual  Home Layout:  (5 JEAN 1st floor bed/bath)  Bathroom Shower/Tub: Tub/shower unit  Bathroom Toilet: Standard  Bathroom Equipment:  (shower chair)   Prior Function:  Level of [place]: Needs assistance with ADLs, Needs assistance with homemaking  Receives Help From: Family  Ambulatory Assistance: Needs assistance  Transfers:  (A 1-2 pivot wheelchair to toilet)  Vocational: Retired  Leisure: word search  Hand Dominance: Right  IADL History:  IADL Comments: A IADLs, A drive 0 pets  ADL:  Eating Assistance: Independent  Grooming Assistance:  (mod A anticipated standing)  Bathing Assistance: Moderate (anticipated)  UE Dressing Assistance: Moderate  LE Dressing Assistance: Maximal  Toileting Assistance with Device:  (mod A x2 sit/stand transfers BSC WHW, standing michela care max A)  Activity Tolerance:  Endurance:  (fair)  Bed Mobility/Transfers:      Transfers  Transfer:  (sit/stand chair and BSC mod A x2 WhW)      Functional Mobility:  Functional Mobility  Functional Mobility Performed:  (pt performed fxnl mob to/from chair/bathroom mod A x2 WhW)  Sitting Balance:  Dynamic Sitting Balance  Dynamic Sitting-Level of Assistance: Close supervision  Standing  Balance:  Dynamic Standing Balance  Dynamic Standing-Level of Assistance: Moderate assistance    IADL's:   IADL Comments: A IADLs, A drive 0 pets  Vision: Vision - Basic Assessment  Current Vision: Wears glasses all the time  Sensation:  Light Touch: No apparent deficits  Strength:  Strength Comments: LUE 3+/5, RUE 4/5  Perception:     Coordination:  Movements are Fluid and Coordinated: No   Hand Function:  Hand Function  Coordination:  (able to grasp walker, IMP coordination sit to stand)  Extremities: RUE   RUE : Within Functional Limits and LUE   LUE: Within Functional Limits      Outcome Measures: [facility] Daily Activity  Putting on and taking off regular lower body clothing: A lot  Bathing (including washing, rinsing, drying): A lot  Putting on and taking off regular upper body clothing: A lot  Toileting, which includes using toilet, bedpan or urinal: A lot  Taking care of personal grooming such as brushing teeth: A little  Eating Meals: None  Daily Activity - Total Score: 15         and OT Adult Other Outcome Measures  4AT: -  Education Documentation  Body Mechanics, taught by Nenita Cardona OT at 6/26/2025  3:06 PM.  Learner: Significant Other, Family, Patient  Readiness: Acceptance  Method: Explanation  Response: Verbalizes Understanding, Needs Reinforcement  Comment: bety gomes ADLs    Precautions, taught by Nenita Cardona OT at 6/26/2025  3:06 PM.  Learner: Significant Other, Family, Patient  Readiness: Acceptance  Method: Explanation  Response: Verbalizes Understanding, Needs Reinforcement  Comment: bety gomes ADLs    ADL Training, taught by Nenita Cardona OT at 6/26/2025  3:06 PM.  Learner: Significant Other, Family, Patient  Readiness: Acceptance  Method: Explanation  Response: Verbalizes Understanding, Needs Reinforcement  Comment: bety mob ADLs    Education Comments  No comments found.      Goals:   Encounter Problems       Encounter Problems (Active)       ADLs       Patient will perform UB and LB  bathing  with minimal assist  level of assistance and ae. (Progressing)       Start:  06/26/25    Expected End:  07/17/25            Patient with complete upper body dressing with independent level of assistance  (Progressing)       Start:  06/26/25    Expected End:  07/17/25            Patient with complete lower body dressing with minimal assist  level of assistance donning and doffing all LE clothes  with PRN adaptive equipment (Progressing)       Start:  06/26/25    Expected End:  07/17/25            Patient will complete daily grooming tasks  with independent level of assistance (Progressing)       Start:  06/26/25    Expected End:  07/17/25            Patient will complete toileting including hygiene clothing management/hygiene with minimal assist  level of assistance and lrd. (Progressing)       Start:  06/26/25    Expected End:  07/17/25               COGNITION/SAFETY       Pt will follow Simple 100% time during OT tx session  (Progressing)       Start:  06/26/25    Expected End:  07/17/25               EXERCISE/STRENGTHENING       Patient with increase BUE to wfl strength. (Progressing)       Start:  06/26/25    Expected End:  07/17/25               MOBILITY       Patient will perform Functional mobility mod  Household distances/Community Distances with minimal assist  level of assistance and least restrictive device in order to improve safety and functional mobility. (Progressing)       Start:  06/26/25    Expected End:  07/17/25               TRANSFERS       Patient will perform bed mobility minimal assist  level of assistance and bed rails in order to improve safety and independence with mobility (Progressing)       Start:  06/26/25    Expected End:  07/17/25            Patient will complete functional transfer least restrictive device with minimal assist  level of assistance. (Progressing)       Start:  06/26/25    Expected End:  07/17/25

## 2025-06-26 NOTE — PROGRESS NOTES
Cassidy Osorio is a 74 y.o. female on day 3 of admission presenting with Brain lesion.    Subjective   NAEO       Objective     Physical Exam  Awake, A&Ox3  PEERL, EOMI, FS, hearing intact to finger rubs b/l  RUE 5/5  LUE 4+/5  LLE 4/5  RLE 5/5   SILT throughout all extremities    Last Recorded Vitals  Blood pressure 159/82, pulse 50, temperature 36.5 °C (97.7 °F), resp. rate 17, SpO2 95%.  Intake/Output last 3 Shifts:  I/O last 3 completed shifts:  In: 700 [P.O.:700]  Out: 900 [Urine:900]    Relevant Results                     Results for orders placed or performed during the hospital encounter of 06/23/25 (from the past 24 hours)   Renal Function Panel   Result Value Ref Range    Glucose 287 (H) 74 - 99 mg/dL    Sodium 131 (L) 136 - 145 mmol/L    Potassium 3.7 3.5 - 5.3 mmol/L    Chloride 101 98 - 107 mmol/L    Bicarbonate 24 21 - 32 mmol/L    Anion Gap 10 10 - 20 mmol/L    Urea Nitrogen 21 6 - 23 mg/dL    Creatinine 0.49 (L) 0.50 - 1.05 mg/dL    eGFR >90 >60 mL/min/1.73m*2    Calcium 8.5 (L) 8.6 - 10.6 mg/dL    Phosphorus 2.1 (L) 2.5 - 4.9 mg/dL    Albumin 3.2 (L) 3.4 - 5.0 g/dL   CBC   Result Value Ref Range    WBC 9.9 4.4 - 11.3 x10*3/uL    nRBC 0.0 0.0 - 0.0 /100 WBCs    RBC 4.93 4.00 - 5.20 x10*6/uL    Hemoglobin 15.0 12.0 - 16.0 g/dL    Hematocrit 42.8 36.0 - 46.0 %    MCV 87 80 - 100 fL    MCH 30.4 26.0 - 34.0 pg    MCHC 35.0 32.0 - 36.0 g/dL    RDW 12.5 11.5 - 14.5 %    Platelets 138 (L) 150 - 450 x10*3/uL   POCT GLUCOSE   Result Value Ref Range    POCT Glucose 276 (H) 74 - 99 mg/dL   POCT GLUCOSE   Result Value Ref Range    POCT Glucose 228 (H) 74 - 99 mg/dL   POCT GLUCOSE   Result Value Ref Range    POCT Glucose 310 (H) 74 - 99 mg/dL   POCT GLUCOSE   Result Value Ref Range    POCT Glucose 297 (H) 74 - 99 mg/dL              Assessment & Plan  Brain lesion    Cassidy Osorio is a 74 y.o. female h/o HTN, HLD, Afib on Eliquis, aortic stenosis (1/2025 TTE EF 65-70%), DVT/PE (2023), DMT2, presented to [facility] ED on  5/22 x10d of L sided weakness/numbness and ataxia, MR/MRA no vascular abnormality, R temporoparietal enhancement w sig edema, 6/23 p/w workup for intracranial mass     Plan:  - floor  - OR planning for R brain bx vs resection wk of 6/30 pending MRI PPF  - Perioperative medicine recs  - Hold Eliquis   - SCDs, SQH  - PTOT evaluation     Cole Vazquez MD

## 2025-06-26 NOTE — ASSESSMENT & PLAN NOTE
Cassidy Osorio is a 74 y.o. female with PMH of HTN, HLD, Afib on Eliquis (currently NSR), aortic stenosis (1/2025 TTE EF 65-70%), DVT/PE (2023), DMT2, presented inially to [facility] ED on 5/22 x10d of L sided weakness/numbness and ataxia, MR/MRA no vascular abnormality, R temporoparietal enhancement w sig edema most likely representing intracranial mass.

## 2025-06-27 ENCOUNTER — APPOINTMENT (OUTPATIENT)
Dept: VASCULAR MEDICINE | Facility: HOSPITAL | Age: 75
End: 2025-06-27
Payer: COMMERCIAL

## 2025-06-27 LAB
ALBUMIN SERPL BCP-MCNC: 2.9 G/DL (ref 3.4–5)
ANION GAP SERPL CALC-SCNC: 8 MMOL/L (ref 10–20)
BUN SERPL-MCNC: 30 MG/DL (ref 6–23)
CALCIUM SERPL-MCNC: 8.2 MG/DL (ref 8.6–10.6)
CHLORIDE SERPL-SCNC: 101 MMOL/L (ref 98–107)
CHLORIDE UR-SCNC: 110 MMOL/L
CHLORIDE/CREATININE (MMOL/G) IN URINE: 261 MMOL/G CREAT (ref 38–318)
CO2 SERPL-SCNC: 25 MMOL/L (ref 21–32)
CREAT SERPL-MCNC: 0.6 MG/DL (ref 0.5–1.05)
CREAT UR-MCNC: 42.2 MG/DL (ref 20–320)
EGFRCR SERPLBLD CKD-EPI 2021: >90 ML/MIN/1.73M*2
GLUCOSE SERPL-MCNC: 275 MG/DL (ref 74–99)
HOLD SPECIMEN: NORMAL
PHOSPHATE SERPL-MCNC: 2.6 MG/DL (ref 2.5–4.9)
POTASSIUM SERPL-SCNC: 3.4 MMOL/L (ref 3.5–5.3)
POTASSIUM UR-SCNC: 32 MMOL/L
POTASSIUM/CREAT UR-RTO: 76 MMOL/G CREAT
SODIUM SERPL-SCNC: 131 MMOL/L (ref 136–145)
SODIUM UR-SCNC: 115 MMOL/L
SODIUM/CREAT UR-RTO: 273 MMOL/G CREAT

## 2025-06-27 PROCEDURE — 2500000002 HC RX 250 W HCPCS SELF ADMINISTERED DRUGS (ALT 637 FOR MEDICARE OP, ALT 636 FOR OP/ED)

## 2025-06-27 PROCEDURE — 93970 EXTREMITY STUDY: CPT

## 2025-06-27 PROCEDURE — 2500000001 HC RX 250 WO HCPCS SELF ADMINISTERED DRUGS (ALT 637 FOR MEDICARE OP)

## 2025-06-27 PROCEDURE — 2500000002 HC RX 250 W HCPCS SELF ADMINISTERED DRUGS (ALT 637 FOR MEDICARE OP, ALT 636 FOR OP/ED): Performed by: PHYSICIAN ASSISTANT

## 2025-06-27 PROCEDURE — 2500000001 HC RX 250 WO HCPCS SELF ADMINISTERED DRUGS (ALT 637 FOR MEDICARE OP): Performed by: PHYSICIAN ASSISTANT

## 2025-06-27 PROCEDURE — 2500000002 HC RX 250 W HCPCS SELF ADMINISTERED DRUGS (ALT 637 FOR MEDICARE OP, ALT 636 FOR OP/ED): Performed by: ANESTHESIOLOGY

## 2025-06-27 PROCEDURE — 2500000004 HC RX 250 GENERAL PHARMACY W/ HCPCS (ALT 636 FOR OP/ED)

## 2025-06-27 PROCEDURE — 2500000001 HC RX 250 WO HCPCS SELF ADMINISTERED DRUGS (ALT 637 FOR MEDICARE OP): Performed by: NURSE PRACTITIONER

## 2025-06-27 PROCEDURE — 80069 RENAL FUNCTION PANEL: CPT | Performed by: NURSE PRACTITIONER

## 2025-06-27 PROCEDURE — 2500000004 HC RX 250 GENERAL PHARMACY W/ HCPCS (ALT 636 FOR OP/ED): Performed by: NEUROLOGICAL SURGERY

## 2025-06-27 PROCEDURE — 36415 COLL VENOUS BLD VENIPUNCTURE: CPT | Performed by: NURSE PRACTITIONER

## 2025-06-27 PROCEDURE — 84133 ASSAY OF URINE POTASSIUM: CPT

## 2025-06-27 PROCEDURE — 82947 ASSAY GLUCOSE BLOOD QUANT: CPT

## 2025-06-27 PROCEDURE — 1100000001 HC PRIVATE ROOM DAILY

## 2025-06-27 PROCEDURE — 93970 EXTREMITY STUDY: CPT | Performed by: SURGERY

## 2025-06-27 PROCEDURE — 82436 ASSAY OF URINE CHLORIDE: CPT

## 2025-06-27 RX ORDER — POTASSIUM CHLORIDE 20 MEQ/1
20 TABLET, EXTENDED RELEASE ORAL ONCE
Status: COMPLETED | OUTPATIENT
Start: 2025-06-27 | End: 2025-06-27

## 2025-06-27 RX ORDER — POTASSIUM CHLORIDE 1.5 G/1.58G
20 POWDER, FOR SOLUTION ORAL ONCE
Status: DISCONTINUED | OUTPATIENT
Start: 2025-06-27 | End: 2025-06-27

## 2025-06-27 RX ADMIN — INSULIN LISPRO 6 UNITS: 100 INJECTION, SOLUTION INTRAVENOUS; SUBCUTANEOUS at 16:54

## 2025-06-27 RX ADMIN — PANTOPRAZOLE SODIUM 40 MG: 20 TABLET, DELAYED RELEASE ORAL at 07:03

## 2025-06-27 RX ADMIN — INSULIN LISPRO 9 UNITS: 100 INJECTION, SOLUTION INTRAVENOUS; SUBCUTANEOUS at 11:58

## 2025-06-27 RX ADMIN — DEXAMETHASONE 4 MG: 4 TABLET ORAL at 09:12

## 2025-06-27 RX ADMIN — INSULIN LISPRO 5 UNITS: 100 INJECTION, SOLUTION INTRAVENOUS; SUBCUTANEOUS at 16:55

## 2025-06-27 RX ADMIN — ESCITALOPRAM OXALATE 10 MG: 10 TABLET ORAL at 09:12

## 2025-06-27 RX ADMIN — METOPROLOL TARTRATE 50 MG: 50 TABLET, FILM COATED ORAL at 09:12

## 2025-06-27 RX ADMIN — DOCUSATE SODIUM 50 MG AND SENNOSIDES 8.6 MG 1 TABLET: 8.6; 5 TABLET, FILM COATED ORAL at 20:46

## 2025-06-27 RX ADMIN — ROSUVASTATIN CALCIUM 40 MG: 20 TABLET, FILM COATED ORAL at 09:11

## 2025-06-27 RX ADMIN — HEPARIN SODIUM 5000 UNITS: 5000 INJECTION, SOLUTION INTRAVENOUS; SUBCUTANEOUS at 07:03

## 2025-06-27 RX ADMIN — HEPARIN SODIUM 5000 UNITS: 5000 INJECTION, SOLUTION INTRAVENOUS; SUBCUTANEOUS at 22:13

## 2025-06-27 RX ADMIN — INSULIN LISPRO 5 UNITS: 100 INJECTION, SOLUTION INTRAVENOUS; SUBCUTANEOUS at 09:04

## 2025-06-27 RX ADMIN — HEPARIN SODIUM 5000 UNITS: 5000 INJECTION, SOLUTION INTRAVENOUS; SUBCUTANEOUS at 13:32

## 2025-06-27 RX ADMIN — INSULIN LISPRO 5 UNITS: 100 INJECTION, SOLUTION INTRAVENOUS; SUBCUTANEOUS at 11:58

## 2025-06-27 RX ADMIN — DEXAMETHASONE 4 MG: 4 TABLET ORAL at 20:46

## 2025-06-27 RX ADMIN — INSULIN LISPRO 9 UNITS: 100 INJECTION, SOLUTION INTRAVENOUS; SUBCUTANEOUS at 09:04

## 2025-06-27 RX ADMIN — POTASSIUM CHLORIDE 20 MEQ: 1500 TABLET, EXTENDED RELEASE ORAL at 11:16

## 2025-06-27 RX ADMIN — INSULIN GLARGINE 14 UNITS: 100 INJECTION, SOLUTION SUBCUTANEOUS at 09:13

## 2025-06-27 ASSESSMENT — PAIN - FUNCTIONAL ASSESSMENT
PAIN_FUNCTIONAL_ASSESSMENT: 0-10

## 2025-06-27 ASSESSMENT — COGNITIVE AND FUNCTIONAL STATUS - GENERAL
MOVING FROM LYING ON BACK TO SITTING ON SIDE OF FLAT BED WITH BEDRAILS: A LITTLE
CLIMB 3 TO 5 STEPS WITH RAILING: TOTAL
MOVING TO AND FROM BED TO CHAIR: A LITTLE
WALKING IN HOSPITAL ROOM: A LOT
MOBILITY SCORE: 14
TURNING FROM BACK TO SIDE WHILE IN FLAT BAD: A LITTLE
STANDING UP FROM CHAIR USING ARMS: A LOT

## 2025-06-27 ASSESSMENT — PAIN SCALES - GENERAL
PAINLEVEL_OUTOF10: 0 - NO PAIN

## 2025-06-27 NOTE — CARE PLAN
Problem: Pain - Adult  Goal: Verbalizes/displays adequate comfort level or baseline comfort level  Outcome: Progressing     Problem: Safety - Adult  Goal: Free from fall injury  Outcome: Progressing     Problem: Fall/Injury  Goal: Not fall by end of shift  Outcome: Progressing  Goal: Be free from injury by end of the shift  Outcome: Progressing  Goal: Use assistive devices by end of the shift  Outcome: Progressing   The patient's goals for the shift include being able to get adequate rest.     The clinical goals for the shift include Patient will remain HDS throughout the shift.

## 2025-06-27 NOTE — PROGRESS NOTES
Cassidy Osorio is a 74 y.o. female on day 4 of admission presenting with Brain lesion.    Subjective   NAEO       Objective     Physical Exam  Awake, A&Ox3  PEERL, EOMI, FS, hearing intact to finger rubs b/l  RUE 5/5  LUE 4+/5  LLE 4/5  RLE 5/5   SILT throughout all extremities    Last Recorded Vitals  Blood pressure 147/86, pulse 59, temperature 36.1 °C (97 °F), resp. rate 18, SpO2 97%.  Intake/Output last 3 Shifts:  I/O last 3 completed shifts:  In: 730 [P.O.:730]  Out: 1250 [Urine:1250]    Relevant Results                     Results for orders placed or performed during the hospital encounter of 06/23/25 (from the past 24 hours)   POCT GLUCOSE   Result Value Ref Range    POCT Glucose 223 (H) 74 - 99 mg/dL   POCT GLUCOSE   Result Value Ref Range    POCT Glucose 288 (H) 74 - 99 mg/dL   Urine electrolytes   Result Value Ref Range    Sodium, Urine Random 118 mmol/L    Sodium/Creatinine Ratio 286 Not established. mmol/g Creat    Potassium, Urine Random 32 mmol/L    Potassium/Creatinine Ratio 78 Not established mmol/g Creat    Chloride, Urine Random 133 mmol/L    Chloride/Creatinine Ratio 323 (H) 38 - 318 mmol/g creat    Creatinine, Urine Random 41.2 20.0 - 320.0 mg/dL   Lavender Top   Result Value Ref Range    Extra Tube Hold for add-ons.    POCT GLUCOSE   Result Value Ref Range    POCT Glucose 235 (H) 74 - 99 mg/dL   POCT GLUCOSE   Result Value Ref Range    POCT Glucose 179 (H) 74 - 99 mg/dL              Assessment & Plan  Brain lesion    Cassidy Osorio is a 74 y.o. female h/o HTN, HLD, Afib on Eliquis, aortic stenosis (1/2025 TTE EF 65-70%), DVT/PE (2023), DMT2, presented to [facility] ED on 5/22 x10d of L sided weakness/numbness and ataxia, MR/MRA no vascular abnormality, R temporoparietal enhancement w sig edema, 6/23 p/w workup for intracranial mass, CT CAP neg  6/25 MRI PPF stable R temporoparietal and parietal lobe enhancing lesions likely neoplasm     Plan:  - floor  - OR planning for R brain bx vs resection wk of  6/30   - Perioperative medicine recs - metop AM of surgery   - Hold Eliquis   - SCDs, SQH  - PTOT evaluation     Jame Duarte MD

## 2025-06-27 NOTE — PROGRESS NOTES
TCC aware that patient is not medically cleared for discharge; patient to have biopsy versus resection next week. Patient does have previous [facility] services with [facility]. TCC will continue to follow for discharge planning.      PRAMOD Wagner, RN  [facility], [facility] 5&9  Transitional Care Coordinator, Mon-Fri  Cell: [phone], Office: [phone]  Email: [email or web address]

## 2025-06-28 LAB
ALBUMIN SERPL BCP-MCNC: 2.9 G/DL (ref 3.4–5)
ANION GAP SERPL CALC-SCNC: 10 MMOL/L (ref 10–20)
BUN SERPL-MCNC: 25 MG/DL (ref 6–23)
CALCIUM SERPL-MCNC: 8.5 MG/DL (ref 8.6–10.6)
CHLORIDE SERPL-SCNC: 103 MMOL/L (ref 98–107)
CO2 SERPL-SCNC: 23 MMOL/L (ref 21–32)
CREAT SERPL-MCNC: 0.39 MG/DL (ref 0.5–1.05)
EGFRCR SERPLBLD CKD-EPI 2021: >90 ML/MIN/1.73M*2
GLUCOSE BLD MANUAL STRIP-MCNC: 269 MG/DL (ref 74–99)
GLUCOSE BLD MANUAL STRIP-MCNC: 381 MG/DL (ref 74–99)
GLUCOSE SERPL-MCNC: 194 MG/DL (ref 74–99)
PHOSPHATE SERPL-MCNC: 2.5 MG/DL (ref 2.5–4.9)
POTASSIUM SERPL-SCNC: 3.4 MMOL/L (ref 3.5–5.3)
SODIUM SERPL-SCNC: 133 MMOL/L (ref 136–145)

## 2025-06-28 PROCEDURE — 2500000001 HC RX 250 WO HCPCS SELF ADMINISTERED DRUGS (ALT 637 FOR MEDICARE OP): Performed by: NURSE PRACTITIONER

## 2025-06-28 PROCEDURE — 2500000001 HC RX 250 WO HCPCS SELF ADMINISTERED DRUGS (ALT 637 FOR MEDICARE OP)

## 2025-06-28 PROCEDURE — 2500000004 HC RX 250 GENERAL PHARMACY W/ HCPCS (ALT 636 FOR OP/ED)

## 2025-06-28 PROCEDURE — 2500000002 HC RX 250 W HCPCS SELF ADMINISTERED DRUGS (ALT 637 FOR MEDICARE OP, ALT 636 FOR OP/ED): Performed by: PHYSICIAN ASSISTANT

## 2025-06-28 PROCEDURE — 82947 ASSAY GLUCOSE BLOOD QUANT: CPT

## 2025-06-28 PROCEDURE — 2500000002 HC RX 250 W HCPCS SELF ADMINISTERED DRUGS (ALT 637 FOR MEDICARE OP, ALT 636 FOR OP/ED)

## 2025-06-28 PROCEDURE — 2500000002 HC RX 250 W HCPCS SELF ADMINISTERED DRUGS (ALT 637 FOR MEDICARE OP, ALT 636 FOR OP/ED): Performed by: ANESTHESIOLOGY

## 2025-06-28 PROCEDURE — 80069 RENAL FUNCTION PANEL: CPT

## 2025-06-28 PROCEDURE — 36415 COLL VENOUS BLD VENIPUNCTURE: CPT

## 2025-06-28 PROCEDURE — 2500000004 HC RX 250 GENERAL PHARMACY W/ HCPCS (ALT 636 FOR OP/ED): Performed by: NEUROLOGICAL SURGERY

## 2025-06-28 PROCEDURE — 2500000001 HC RX 250 WO HCPCS SELF ADMINISTERED DRUGS (ALT 637 FOR MEDICARE OP): Performed by: PHYSICIAN ASSISTANT

## 2025-06-28 PROCEDURE — 1100000001 HC PRIVATE ROOM DAILY

## 2025-06-28 RX ADMIN — INSULIN LISPRO 5 UNITS: 100 INJECTION, SOLUTION INTRAVENOUS; SUBCUTANEOUS at 12:15

## 2025-06-28 RX ADMIN — INSULIN LISPRO 5 UNITS: 100 INJECTION, SOLUTION INTRAVENOUS; SUBCUTANEOUS at 16:46

## 2025-06-28 RX ADMIN — INSULIN LISPRO 15 UNITS: 100 INJECTION, SOLUTION INTRAVENOUS; SUBCUTANEOUS at 12:15

## 2025-06-28 RX ADMIN — INSULIN LISPRO 5 UNITS: 100 INJECTION, SOLUTION INTRAVENOUS; SUBCUTANEOUS at 08:22

## 2025-06-28 RX ADMIN — PANTOPRAZOLE SODIUM 40 MG: 20 TABLET, DELAYED RELEASE ORAL at 06:25

## 2025-06-28 RX ADMIN — ROSUVASTATIN CALCIUM 40 MG: 20 TABLET, FILM COATED ORAL at 08:22

## 2025-06-28 RX ADMIN — ESCITALOPRAM OXALATE 10 MG: 10 TABLET ORAL at 08:22

## 2025-06-28 RX ADMIN — DEXAMETHASONE 4 MG: 4 TABLET ORAL at 21:22

## 2025-06-28 RX ADMIN — POLYETHYLENE GLYCOL 3350 17 G: 17 POWDER, FOR SOLUTION ORAL at 08:26

## 2025-06-28 RX ADMIN — HEPARIN SODIUM 5000 UNITS: 5000 INJECTION, SOLUTION INTRAVENOUS; SUBCUTANEOUS at 21:22

## 2025-06-28 RX ADMIN — METOPROLOL TARTRATE 50 MG: 50 TABLET, FILM COATED ORAL at 21:22

## 2025-06-28 RX ADMIN — INSULIN LISPRO 6 UNITS: 100 INJECTION, SOLUTION INTRAVENOUS; SUBCUTANEOUS at 08:22

## 2025-06-28 RX ADMIN — HEPARIN SODIUM 5000 UNITS: 5000 INJECTION, SOLUTION INTRAVENOUS; SUBCUTANEOUS at 06:24

## 2025-06-28 RX ADMIN — HEPARIN SODIUM 5000 UNITS: 5000 INJECTION, SOLUTION INTRAVENOUS; SUBCUTANEOUS at 14:41

## 2025-06-28 RX ADMIN — INSULIN GLARGINE 14 UNITS: 100 INJECTION, SOLUTION SUBCUTANEOUS at 08:55

## 2025-06-28 RX ADMIN — INSULIN LISPRO 9 UNITS: 100 INJECTION, SOLUTION INTRAVENOUS; SUBCUTANEOUS at 16:46

## 2025-06-28 RX ADMIN — DEXAMETHASONE 4 MG: 4 TABLET ORAL at 08:22

## 2025-06-28 ASSESSMENT — PAIN SCALES - GENERAL
PAINLEVEL_OUTOF10: 0 - NO PAIN

## 2025-06-28 ASSESSMENT — PAIN - FUNCTIONAL ASSESSMENT
PAIN_FUNCTIONAL_ASSESSMENT: 0-10

## 2025-06-28 ASSESSMENT — COGNITIVE AND FUNCTIONAL STATUS - GENERAL
MOVING TO AND FROM BED TO CHAIR: A LITTLE
MOVING FROM LYING ON BACK TO SITTING ON SIDE OF FLAT BED WITH BEDRAILS: A LITTLE
STANDING UP FROM CHAIR USING ARMS: A LITTLE
TURNING FROM BACK TO SIDE WHILE IN FLAT BAD: A LITTLE
CLIMB 3 TO 5 STEPS WITH RAILING: A LOT
MOBILITY SCORE: 17
WALKING IN HOSPITAL ROOM: A LITTLE

## 2025-06-28 NOTE — PROGRESS NOTES
Cassidy Osorio is a 74 y.o. female on day 5 of admission presenting with Brain lesion.    Subjective   NAEO       Objective     Physical Exam  Awake, A&Ox3  PEERL, EOMI, FS, hearing intact to finger rubs b/l  RUE 5/5  LUE 4+/5  LLE 4/5  RLE 5/5   SILT throughout all extremities    Last Recorded Vitals  Blood pressure 138/84, pulse 50, temperature 36 °C (96.8 °F), resp. rate 18, SpO2 96%.  Intake/Output last 3 Shifts:  I/O last 3 completed shifts:  In: 610 [P.O.:610]  Out: 1250 [Urine:1250]    Relevant Results                     Results for orders placed or performed during the hospital encounter of 06/23/25 (from the past 24 hours)   Renal Function Panel   Result Value Ref Range    Glucose 275 (H) 74 - 99 mg/dL    Sodium 131 (L) 136 - 145 mmol/L    Potassium 3.4 (L) 3.5 - 5.3 mmol/L    Chloride 101 98 - 107 mmol/L    Bicarbonate 25 21 - 32 mmol/L    Anion Gap 8 (L) 10 - 20 mmol/L    Urea Nitrogen 30 (H) 6 - 23 mg/dL    Creatinine 0.60 0.50 - 1.05 mg/dL    eGFR >90 >60 mL/min/1.73m*2    Calcium 8.2 (L) 8.6 - 10.6 mg/dL    Phosphorus 2.6 2.5 - 4.9 mg/dL    Albumin 2.9 (L) 3.4 - 5.0 g/dL   Lavender Top   Result Value Ref Range    Extra Tube Hold for add-ons.    Urine electrolytes   Result Value Ref Range    Sodium, Urine Random 115 mmol/L    Sodium/Creatinine Ratio 273 Not established. mmol/g Creat    Potassium, Urine Random 32 mmol/L    Potassium/Creatinine Ratio 76 Not established mmol/g Creat    Chloride, Urine Random 110 mmol/L    Chloride/Creatinine Ratio 261 38 - 318 mmol/g creat    Creatinine, Urine Random 42.2 20.0 - 320.0 mg/dL              Assessment & Plan  Brain lesion    Cassidy Osorio is a 74 y.o. female h/o HTN, HLD, Afib on Eliquis, aortic stenosis (1/2025 TTE EF 65-70%), DVT/PE (2023), DMT2, presented to [facility] ED on 5/22 x10d of L sided weakness/numbness and ataxia, MR/MRA no vascular abnormality, R temporoparietal enhancement w sig edema, 6/23 p/w workup for intracranial mass, CT CAP neg  6/25 MRI PPF  stable R temporoparietal and parietal lobe enhancing lesions likely neoplasm  6/27 BLE DVT US neg     Plan:  - floor  - OR planning for R brain bx Tues 7/1  - Perioperative medicine recs - metop AM of surgery   - Hold Harry will discuss restart plan after OR   - SCDs, SQH  - PTOT evaluation     Jame Duarte MD

## 2025-06-29 VITALS
TEMPERATURE: 97.3 F | HEART RATE: 76 BPM | DIASTOLIC BLOOD PRESSURE: 72 MMHG | SYSTOLIC BLOOD PRESSURE: 115 MMHG | RESPIRATION RATE: 18 BRPM | OXYGEN SATURATION: 95 %

## 2025-06-29 LAB
ALBUMIN SERPL BCP-MCNC: 2.8 G/DL (ref 3.4–5)
ANION GAP SERPL CALC-SCNC: 12 MMOL/L (ref 10–20)
BUN SERPL-MCNC: 20 MG/DL (ref 6–23)
CALCIUM SERPL-MCNC: 8.2 MG/DL (ref 8.6–10.6)
CHLORIDE SERPL-SCNC: 102 MMOL/L (ref 98–107)
CO2 SERPL-SCNC: 22 MMOL/L (ref 21–32)
CREAT SERPL-MCNC: 0.62 MG/DL (ref 0.5–1.05)
EGFRCR SERPLBLD CKD-EPI 2021: >90 ML/MIN/1.73M*2
GLUCOSE BLD MANUAL STRIP-MCNC: 205 MG/DL (ref 74–99)
GLUCOSE BLD MANUAL STRIP-MCNC: 258 MG/DL (ref 74–99)
GLUCOSE BLD MANUAL STRIP-MCNC: 312 MG/DL (ref 74–99)
GLUCOSE SERPL-MCNC: 271 MG/DL (ref 74–99)
PHOSPHATE SERPL-MCNC: 2 MG/DL (ref 2.5–4.9)
POTASSIUM SERPL-SCNC: 3.4 MMOL/L (ref 3.5–5.3)
SODIUM SERPL-SCNC: 133 MMOL/L (ref 136–145)

## 2025-06-29 PROCEDURE — 1100000001 HC PRIVATE ROOM DAILY

## 2025-06-29 PROCEDURE — 2500000002 HC RX 250 W HCPCS SELF ADMINISTERED DRUGS (ALT 637 FOR MEDICARE OP, ALT 636 FOR OP/ED): Performed by: PHYSICIAN ASSISTANT

## 2025-06-29 PROCEDURE — 2500000001 HC RX 250 WO HCPCS SELF ADMINISTERED DRUGS (ALT 637 FOR MEDICARE OP)

## 2025-06-29 PROCEDURE — 82947 ASSAY GLUCOSE BLOOD QUANT: CPT

## 2025-06-29 PROCEDURE — 36415 COLL VENOUS BLD VENIPUNCTURE: CPT

## 2025-06-29 PROCEDURE — 2500000004 HC RX 250 GENERAL PHARMACY W/ HCPCS (ALT 636 FOR OP/ED): Performed by: NEUROLOGICAL SURGERY

## 2025-06-29 PROCEDURE — 80069 RENAL FUNCTION PANEL: CPT

## 2025-06-29 PROCEDURE — 2500000004 HC RX 250 GENERAL PHARMACY W/ HCPCS (ALT 636 FOR OP/ED)

## 2025-06-29 PROCEDURE — 2500000002 HC RX 250 W HCPCS SELF ADMINISTERED DRUGS (ALT 637 FOR MEDICARE OP, ALT 636 FOR OP/ED)

## 2025-06-29 PROCEDURE — 2500000001 HC RX 250 WO HCPCS SELF ADMINISTERED DRUGS (ALT 637 FOR MEDICARE OP): Performed by: PHYSICIAN ASSISTANT

## 2025-06-29 PROCEDURE — 2500000001 HC RX 250 WO HCPCS SELF ADMINISTERED DRUGS (ALT 637 FOR MEDICARE OP): Performed by: NURSE PRACTITIONER

## 2025-06-29 PROCEDURE — 2500000002 HC RX 250 W HCPCS SELF ADMINISTERED DRUGS (ALT 637 FOR MEDICARE OP, ALT 636 FOR OP/ED): Performed by: ANESTHESIOLOGY

## 2025-06-29 RX ADMIN — HEPARIN SODIUM 5000 UNITS: 5000 INJECTION, SOLUTION INTRAVENOUS; SUBCUTANEOUS at 13:55

## 2025-06-29 RX ADMIN — INSULIN LISPRO 5 UNITS: 100 INJECTION, SOLUTION INTRAVENOUS; SUBCUTANEOUS at 12:36

## 2025-06-29 RX ADMIN — INSULIN LISPRO 5 UNITS: 100 INJECTION, SOLUTION INTRAVENOUS; SUBCUTANEOUS at 08:41

## 2025-06-29 RX ADMIN — INSULIN GLARGINE 14 UNITS: 100 INJECTION, SOLUTION SUBCUTANEOUS at 08:46

## 2025-06-29 RX ADMIN — INSULIN LISPRO 12 UNITS: 100 INJECTION, SOLUTION INTRAVENOUS; SUBCUTANEOUS at 16:20

## 2025-06-29 RX ADMIN — METOPROLOL TARTRATE 50 MG: 50 TABLET, FILM COATED ORAL at 20:20

## 2025-06-29 RX ADMIN — ESCITALOPRAM OXALATE 10 MG: 10 TABLET ORAL at 08:40

## 2025-06-29 RX ADMIN — INSULIN LISPRO 5 UNITS: 100 INJECTION, SOLUTION INTRAVENOUS; SUBCUTANEOUS at 16:20

## 2025-06-29 RX ADMIN — DEXAMETHASONE 4 MG: 4 TABLET ORAL at 20:20

## 2025-06-29 RX ADMIN — HEPARIN SODIUM 5000 UNITS: 5000 INJECTION, SOLUTION INTRAVENOUS; SUBCUTANEOUS at 21:55

## 2025-06-29 RX ADMIN — DEXAMETHASONE 4 MG: 4 TABLET ORAL at 08:40

## 2025-06-29 RX ADMIN — INSULIN LISPRO 9 UNITS: 100 INJECTION, SOLUTION INTRAVENOUS; SUBCUTANEOUS at 12:36

## 2025-06-29 RX ADMIN — DOCUSATE SODIUM 50 MG AND SENNOSIDES 8.6 MG 1 TABLET: 8.6; 5 TABLET, FILM COATED ORAL at 20:20

## 2025-06-29 RX ADMIN — PANTOPRAZOLE SODIUM 40 MG: 20 TABLET, DELAYED RELEASE ORAL at 06:21

## 2025-06-29 RX ADMIN — ROSUVASTATIN CALCIUM 40 MG: 20 TABLET, FILM COATED ORAL at 08:40

## 2025-06-29 RX ADMIN — INSULIN LISPRO 6 UNITS: 100 INJECTION, SOLUTION INTRAVENOUS; SUBCUTANEOUS at 08:41

## 2025-06-29 RX ADMIN — HEPARIN SODIUM 5000 UNITS: 5000 INJECTION, SOLUTION INTRAVENOUS; SUBCUTANEOUS at 06:21

## 2025-06-29 ASSESSMENT — PAIN SCALES - GENERAL
PAINLEVEL_OUTOF10: 0 - NO PAIN

## 2025-06-29 ASSESSMENT — COGNITIVE AND FUNCTIONAL STATUS - GENERAL
TURNING FROM BACK TO SIDE WHILE IN FLAT BAD: A LITTLE
CLIMB 3 TO 5 STEPS WITH RAILING: TOTAL
STANDING UP FROM CHAIR USING ARMS: A LOT
MOVING FROM LYING ON BACK TO SITTING ON SIDE OF FLAT BED WITH BEDRAILS: A LITTLE
WALKING IN HOSPITAL ROOM: TOTAL
MOVING TO AND FROM BED TO CHAIR: A LOT
MOBILITY SCORE: 12

## 2025-06-29 ASSESSMENT — PAIN - FUNCTIONAL ASSESSMENT
PAIN_FUNCTIONAL_ASSESSMENT: 0-10

## 2025-06-29 NOTE — PROGRESS NOTES
Cassidy Osorio is a 74 y.o. female on day 6 of admission presenting with Brain lesion.    Subjective   NAEO       Objective     Physical Exam  Awake, A&Ox3  PEERL, EOMI, FS, hearing intact to finger rubs b/l  RUE 5/5  LUE 4+/5  LLE 4/5  RLE 5/5   SILT throughout all extremities    Last Recorded Vitals  Blood pressure 142/73, pulse 55, temperature 35.9 °C (96.6 °F), temperature source Temporal, resp. rate 17, SpO2 96%.  Intake/Output last 3 Shifts:  I/O last 3 completed shifts:  In: 120 [P.O.:120]  Out: 600 [Urine:600]    Relevant Results                     Results for orders placed or performed during the hospital encounter of 06/23/25 (from the past 24 hours)   Renal Function Panel   Result Value Ref Range    Glucose 194 (H) 74 - 99 mg/dL    Sodium 133 (L) 136 - 145 mmol/L    Potassium 3.4 (L) 3.5 - 5.3 mmol/L    Chloride 103 98 - 107 mmol/L    Bicarbonate 23 21 - 32 mmol/L    Anion Gap 10 10 - 20 mmol/L    Urea Nitrogen 25 (H) 6 - 23 mg/dL    Creatinine 0.39 (L) 0.50 - 1.05 mg/dL    eGFR >90 >60 mL/min/1.73m*2    Calcium 8.5 (L) 8.6 - 10.6 mg/dL    Phosphorus 2.5 2.5 - 4.9 mg/dL    Albumin 2.9 (L) 3.4 - 5.0 g/dL   POCT GLUCOSE   Result Value Ref Range    POCT Glucose 381 (H) 74 - 99 mg/dL   POCT GLUCOSE   Result Value Ref Range    POCT Glucose 269 (H) 74 - 99 mg/dL              Assessment & Plan  Brain lesion    Cassidy Osorio is a 74 y.o. female h/o HTN, HLD, Afib on Eliquis, aortic stenosis (1/2025 TTE EF 65-70%), DVT/PE (2023), DMT2, presented to [facility] ED on 5/22 x10d of L sided weakness/numbness and ataxia, MR/MRA no vascular abnormality, R temporoparietal enhancement w sig edema, 6/23 p/w workup for intracranial mass, CT CAP neg  6/25 MRI PPF stable R temporoparietal and parietal lobe enhancing lesions likely neoplasm  6/27 BLE DVT US neg     Plan:  - floor  - OR planning for R brain bx Tues 7/1  - Perioperative medicine recs - 800 ml fluid restriction, acceptable to proceed, metop AM of surgery,  - Hold Eliquis  will discuss restart plan after OR   - SCDs, SQH  - PTOT evaluation     Jame Duarte MD

## 2025-06-29 NOTE — CARE PLAN
The clinical goals for the shift include Patient will have no neurological changes MET    No neurological changes. Denies any pain. Up to BSC with walker and assistance. VS remained within parameters.       Problem: Pain - Adult  Goal: Verbalizes/displays adequate comfort level or baseline comfort level  Outcome: Progressing     Problem: Safety - Adult  Goal: Free from fall injury  Outcome: Progressing     Problem: Fall/Injury  Goal: Use assistive devices by end of the shift  Outcome: Progressing     Problem: Diabetes  Goal: No changes in neurological exam by end of shift  Outcome: Progressing     Problem: Diabetes  Goal: Vital signs within normal range for age by end of shift  Outcome: Progressing     Problem: Skin  Goal: Prevent/minimize sheer/friction injuries  Outcome: Progressing  Flowsheets (Taken 6/29/2025 0681)  Prevent/minimize sheer/friction injuries:   Turn/reposition every 2 hours/use positioning/transfer devices   HOB 30 degrees or less  Note: Patient moving self in bed.

## 2025-06-30 ENCOUNTER — APPOINTMENT (OUTPATIENT)
Dept: RADIOLOGY | Facility: HOSPITAL | Age: 75
End: 2025-06-30
Payer: COMMERCIAL

## 2025-06-30 ENCOUNTER — ANESTHESIA EVENT (OUTPATIENT)
Dept: OPERATING ROOM | Facility: HOSPITAL | Age: 75
End: 2025-06-30
Payer: COMMERCIAL

## 2025-06-30 LAB
ABO GROUP (TYPE) IN BLOOD: NORMAL
ALBUMIN SERPL BCP-MCNC: 2.8 G/DL (ref 3.4–5)
ANION GAP SERPL CALC-SCNC: 11 MMOL/L (ref 10–20)
ANTIBODY SCREEN: NORMAL
APPEARANCE UR: ABNORMAL
APTT PPP: 105 SECONDS (ref 26–36)
APTT PPP: 127 SECONDS (ref 26–36)
BILIRUB UR STRIP.AUTO-MCNC: NEGATIVE MG/DL
BUN SERPL-MCNC: 22 MG/DL (ref 6–23)
CALCIUM SERPL-MCNC: 8.2 MG/DL (ref 8.6–10.6)
CHLORIDE SERPL-SCNC: 102 MMOL/L (ref 98–107)
CO2 SERPL-SCNC: 22 MMOL/L (ref 21–32)
COLOR UR: YELLOW
CREAT SERPL-MCNC: 0.46 MG/DL (ref 0.5–1.05)
EGFRCR SERPLBLD CKD-EPI 2021: >90 ML/MIN/1.73M*2
ERYTHROCYTE [DISTWIDTH] IN BLOOD BY AUTOMATED COUNT: 13.3 % (ref 11.5–14.5)
GLUCOSE BLD MANUAL STRIP-MCNC: 160 MG/DL (ref 74–99)
GLUCOSE BLD MANUAL STRIP-MCNC: 168 MG/DL (ref 74–99)
GLUCOSE BLD MANUAL STRIP-MCNC: 180 MG/DL (ref 74–99)
GLUCOSE BLD MANUAL STRIP-MCNC: 215 MG/DL (ref 74–99)
GLUCOSE BLD MANUAL STRIP-MCNC: 218 MG/DL (ref 74–99)
GLUCOSE BLD MANUAL STRIP-MCNC: 220 MG/DL (ref 74–99)
GLUCOSE BLD MANUAL STRIP-MCNC: 228 MG/DL (ref 74–99)
GLUCOSE BLD MANUAL STRIP-MCNC: 238 MG/DL (ref 74–99)
GLUCOSE BLD MANUAL STRIP-MCNC: 239 MG/DL (ref 74–99)
GLUCOSE BLD MANUAL STRIP-MCNC: 246 MG/DL (ref 74–99)
GLUCOSE BLD MANUAL STRIP-MCNC: 252 MG/DL (ref 74–99)
GLUCOSE BLD MANUAL STRIP-MCNC: 293 MG/DL (ref 74–99)
GLUCOSE SERPL-MCNC: 169 MG/DL (ref 74–99)
GLUCOSE UR STRIP.AUTO-MCNC: ABNORMAL MG/DL
HCT VFR BLD AUTO: 41.5 % (ref 36–46)
HGB BLD-MCNC: 13.9 G/DL (ref 12–16)
INR PPP: 1 (ref 0.9–1.1)
INR PPP: 1 (ref 0.9–1.1)
KETONES UR STRIP.AUTO-MCNC: NEGATIVE MG/DL
LEUKOCYTE ESTERASE UR QL STRIP.AUTO: NEGATIVE
MCH RBC QN AUTO: 30.6 PG (ref 26–34)
MCHC RBC AUTO-ENTMCNC: 33.5 G/DL (ref 32–36)
MCV RBC AUTO: 91 FL (ref 80–100)
NITRITE UR QL STRIP.AUTO: NEGATIVE
NRBC BLD-RTO: 0 /100 WBCS (ref 0–0)
PH UR STRIP.AUTO: 7 [PH]
PHOSPHATE SERPL-MCNC: 2.7 MG/DL (ref 2.5–4.9)
PLATELET # BLD AUTO: 117 X10*3/UL (ref 150–450)
POTASSIUM SERPL-SCNC: 3.5 MMOL/L (ref 3.5–5.3)
PROT UR STRIP.AUTO-MCNC: ABNORMAL MG/DL
PROTHROMBIN TIME: 11.5 SECONDS (ref 9.8–12.4)
PROTHROMBIN TIME: 11.5 SECONDS (ref 9.8–12.4)
RBC # BLD AUTO: 4.54 X10*6/UL (ref 4–5.2)
RBC # UR STRIP.AUTO: NEGATIVE MG/DL
RBC #/AREA URNS AUTO: ABNORMAL /HPF
RH FACTOR (ANTIGEN D): NORMAL
SODIUM SERPL-SCNC: 131 MMOL/L (ref 136–145)
SP GR UR STRIP.AUTO: 1.02
SQUAMOUS #/AREA URNS AUTO: ABNORMAL /HPF
UROBILINOGEN UR STRIP.AUTO-MCNC: ABNORMAL MG/DL
WBC # BLD AUTO: 9.3 X10*3/UL (ref 4.4–11.3)
WBC #/AREA URNS AUTO: ABNORMAL /HPF

## 2025-06-30 PROCEDURE — 2500000002 HC RX 250 W HCPCS SELF ADMINISTERED DRUGS (ALT 637 FOR MEDICARE OP, ALT 636 FOR OP/ED): Performed by: PHYSICIAN ASSISTANT

## 2025-06-30 PROCEDURE — 2500000001 HC RX 250 WO HCPCS SELF ADMINISTERED DRUGS (ALT 637 FOR MEDICARE OP): Performed by: NURSE PRACTITIONER

## 2025-06-30 PROCEDURE — 85610 PROTHROMBIN TIME: CPT

## 2025-06-30 PROCEDURE — 82947 ASSAY GLUCOSE BLOOD QUANT: CPT

## 2025-06-30 PROCEDURE — 36415 COLL VENOUS BLD VENIPUNCTURE: CPT

## 2025-06-30 PROCEDURE — 70450 CT HEAD/BRAIN W/O DYE: CPT | Performed by: RADIOLOGY

## 2025-06-30 PROCEDURE — 86850 RBC ANTIBODY SCREEN: CPT

## 2025-06-30 PROCEDURE — 84100 ASSAY OF PHOSPHORUS: CPT

## 2025-06-30 PROCEDURE — 2500000004 HC RX 250 GENERAL PHARMACY W/ HCPCS (ALT 636 FOR OP/ED): Performed by: NEUROLOGICAL SURGERY

## 2025-06-30 PROCEDURE — 71045 X-RAY EXAM CHEST 1 VIEW: CPT | Performed by: RADIOLOGY

## 2025-06-30 PROCEDURE — 2500000002 HC RX 250 W HCPCS SELF ADMINISTERED DRUGS (ALT 637 FOR MEDICARE OP, ALT 636 FOR OP/ED): Performed by: ANESTHESIOLOGY

## 2025-06-30 PROCEDURE — 71045 X-RAY EXAM CHEST 1 VIEW: CPT

## 2025-06-30 PROCEDURE — 2500000004 HC RX 250 GENERAL PHARMACY W/ HCPCS (ALT 636 FOR OP/ED)

## 2025-06-30 PROCEDURE — 85730 THROMBOPLASTIN TIME PARTIAL: CPT

## 2025-06-30 PROCEDURE — 2500000001 HC RX 250 WO HCPCS SELF ADMINISTERED DRUGS (ALT 637 FOR MEDICARE OP): Performed by: PHYSICIAN ASSISTANT

## 2025-06-30 PROCEDURE — 81001 URINALYSIS AUTO W/SCOPE: CPT

## 2025-06-30 PROCEDURE — 1100000001 HC PRIVATE ROOM DAILY

## 2025-06-30 PROCEDURE — 2500000002 HC RX 250 W HCPCS SELF ADMINISTERED DRUGS (ALT 637 FOR MEDICARE OP, ALT 636 FOR OP/ED)

## 2025-06-30 PROCEDURE — 2500000001 HC RX 250 WO HCPCS SELF ADMINISTERED DRUGS (ALT 637 FOR MEDICARE OP)

## 2025-06-30 PROCEDURE — 70450 CT HEAD/BRAIN W/O DYE: CPT

## 2025-06-30 PROCEDURE — 85027 COMPLETE CBC AUTOMATED: CPT

## 2025-06-30 RX ORDER — SODIUM CHLORIDE 9 MG/ML
50 INJECTION, SOLUTION INTRAVENOUS CONTINUOUS
Status: DISCONTINUED | OUTPATIENT
Start: 2025-07-01 | End: 2025-07-01

## 2025-06-30 RX ADMIN — HEPARIN SODIUM 5000 UNITS: 5000 INJECTION, SOLUTION INTRAVENOUS; SUBCUTANEOUS at 21:23

## 2025-06-30 RX ADMIN — PANTOPRAZOLE SODIUM 40 MG: 20 TABLET, DELAYED RELEASE ORAL at 06:12

## 2025-06-30 RX ADMIN — HEPARIN SODIUM 5000 UNITS: 5000 INJECTION, SOLUTION INTRAVENOUS; SUBCUTANEOUS at 13:53

## 2025-06-30 RX ADMIN — INSULIN GLARGINE 14 UNITS: 100 INJECTION, SOLUTION SUBCUTANEOUS at 09:06

## 2025-06-30 RX ADMIN — HEPARIN SODIUM 5000 UNITS: 5000 INJECTION, SOLUTION INTRAVENOUS; SUBCUTANEOUS at 06:12

## 2025-06-30 RX ADMIN — SENNOSIDES AND DOCUSATE SODIUM 1 TABLET: 50; 8.6 TABLET ORAL at 20:33

## 2025-06-30 RX ADMIN — INSULIN LISPRO 5 UNITS: 100 INJECTION, SOLUTION INTRAVENOUS; SUBCUTANEOUS at 09:05

## 2025-06-30 RX ADMIN — INSULIN LISPRO 5 UNITS: 100 INJECTION, SOLUTION INTRAVENOUS; SUBCUTANEOUS at 13:53

## 2025-06-30 RX ADMIN — DEXAMETHASONE 4 MG: 4 TABLET ORAL at 09:05

## 2025-06-30 RX ADMIN — INSULIN LISPRO 5 UNITS: 100 INJECTION, SOLUTION INTRAVENOUS; SUBCUTANEOUS at 17:58

## 2025-06-30 RX ADMIN — METOPROLOL TARTRATE 50 MG: 50 TABLET, FILM COATED ORAL at 20:33

## 2025-06-30 RX ADMIN — ROSUVASTATIN CALCIUM 40 MG: 20 TABLET, FILM COATED ORAL at 09:05

## 2025-06-30 RX ADMIN — DEXAMETHASONE 4 MG: 4 TABLET ORAL at 20:33

## 2025-06-30 RX ADMIN — INSULIN LISPRO 3 UNITS: 100 INJECTION, SOLUTION INTRAVENOUS; SUBCUTANEOUS at 17:15

## 2025-06-30 RX ADMIN — METOPROLOL TARTRATE 50 MG: 50 TABLET, FILM COATED ORAL at 09:05

## 2025-06-30 RX ADMIN — POLYETHYLENE GLYCOL 3350 17 G: 17 POWDER, FOR SOLUTION ORAL at 09:05

## 2025-06-30 RX ADMIN — INSULIN LISPRO 3 UNITS: 100 INJECTION, SOLUTION INTRAVENOUS; SUBCUTANEOUS at 13:54

## 2025-06-30 RX ADMIN — ESCITALOPRAM OXALATE 10 MG: 10 TABLET ORAL at 09:05

## 2025-06-30 RX ADMIN — INSULIN LISPRO 6 UNITS: 100 INJECTION, SOLUTION INTRAVENOUS; SUBCUTANEOUS at 09:04

## 2025-06-30 ASSESSMENT — COGNITIVE AND FUNCTIONAL STATUS - GENERAL
MOVING TO AND FROM BED TO CHAIR: A LOT
STANDING UP FROM CHAIR USING ARMS: A LOT
TURNING FROM BACK TO SIDE WHILE IN FLAT BAD: A LITTLE
MOBILITY SCORE: 14
WALKING IN HOSPITAL ROOM: A LOT
CLIMB 3 TO 5 STEPS WITH RAILING: TOTAL

## 2025-06-30 ASSESSMENT — PAIN - FUNCTIONAL ASSESSMENT
PAIN_FUNCTIONAL_ASSESSMENT: 0-10

## 2025-06-30 ASSESSMENT — PAIN SCALES - GENERAL
PAINLEVEL_OUTOF10: 0 - NO PAIN

## 2025-06-30 NOTE — CARE PLAN
The patient's goals for the shift include      The clinical goals for the shift include Pt will remain safe and free from falls overnight      Problem: Pain - Adult  Goal: Verbalizes/displays adequate comfort level or baseline comfort level  Outcome: Progressing     Problem: Safety - Adult  Goal: Free from fall injury  Outcome: Progressing     Problem: Discharge Planning  Goal: Discharge to home or other facility with appropriate resources  Outcome: Progressing     Problem: Chronic Conditions and Co-morbidities  Goal: Patient's chronic conditions and co-morbidity symptoms are monitored and maintained or improved  Outcome: Progressing     Problem: Nutrition  Goal: Nutrient intake appropriate for maintaining nutritional needs  Outcome: Progressing     Problem: Fall/Injury  Goal: Not fall by end of shift  Outcome: Progressing     Problem: Fall/Injury  Goal: Be free from injury by end of the shift  Outcome: Progressing     Problem: Fall/Injury  Goal: Verbalize understanding of personal risk factors for fall in the hospital  Outcome: Progressing     Problem: Skin  Goal: Promote/optimize nutrition  Outcome: Progressing     Problem: Skin  Goal: Promote skin healing  Outcome: Progressing

## 2025-06-30 NOTE — PROGRESS NOTES
Occupational Therapy    Therapy Communication Note    Patient Name: Cassidy Osorio  MRN: [id number]  Today's Date: 6/30/2025     Missed Visit Reason: Other (Comment) (Plan for OR 7/1, will hold OT tx.)      06/30/25 at 8:33 AM   Ce COSME/L, OTD  Rehab Office: [phone]

## 2025-06-30 NOTE — INTERVAL H&P NOTE
H&P reviewed. The patient was examined and there are no changes to the H&P.   [facility]  [address]  [facility] [address]  Dept Phone: [phone]    Developmental Pediatrics Follow Up    Name: Rodrigo Noriega                   YOB: 2014      Patient ID: [id number]           Age: 9 year old  Date of Service: 8/14/2024               Clinician: Billie Packer MD    This child and family were seen for a Developmental and Behavioral Pediatric physician follow-up visit.      Rodrigo Noriega is a 9 year old who presents for a telehealth visit via live interactive video with a secure connection. This visit was not recorded or stored.       Provider location: Home in the [facility]  Patient Location: Home in the [facility] he was accompanied by Father      Consent for telehealth visit was verified with the parent/guardian, including risk of electronic data, possibility of equipment failure or need for in person visit if condition cannot be fully assessed by telehealth. Parent/guardian was also informed that consent to treat includes permission to submit charges to the patient's insurance.     Reason for visit: medication management  Reason for use of telehealth: patient/parent preference    Update from last visit: Rodrigo is a 9 -year-old boy whose development and behavior meet criteria for autism spectrum disorder, attention deficit hyperactivity disorder, combined presentation  and unspecified anxiety disorder. He was last seen in clinic in May. Since this last visit, father states that he has been doing well.    He is currently taking Focalin XR to 10 mg and he took this throughout the summer.     Positive behaviors noticed from medicine: Parents notice mild benefits with attention and activity levels.    Side effects noted from medicine: no stomach pain or headaches.    Sleep changes: No changes with his sleep    Eating changes: no changes with appetite, but he might have lost  some weight during summer. Different scales were used for the last 3 weight checks.    Change in body movements: none.    Mood changes: none      Educational and Therapeutic History:  IEP in place that includes the followin:1 aide, occupational therapy, speech therapy and social work.   He has a regular education and . Rodrigo enjoys math and science.    He participated in summer camp and won an award for \"best behaved\".      Current Medications:    Current Outpatient Medications   Medication Sig Dispense Refill    dexmethylpheniDATE (FOCALIN XR) 15 MG 24 hr capsule Take 1 capsule by mouth every morning. 30 capsule 0    sertraline (ZOLOFT) 20 MG/ML concentrated oral solution Take 0.5 mLs by mouth daily. 60 mL 0    dexmethylphenidate (FOCALIN XR) 10 MG 24 hr capsule Take 1 capsule by mouth every morning. Begin taking on May 30, 2024. 30 capsule 0    dexmethylphenidate (FOCALIN XR) 10 MG 24 hr capsule Take 1 capsule by mouth every morning. Begin taking on 2024. 30 capsule 0    dexmethylphenidate (FOCALIN XR) 10 MG 24 hr capsule Take 1 capsule by mouth every morning. Begin taking on 2024. 30 capsule 0    Pediatric Multivit-Minerals-C (SMARTY PANTS KIDS COMPLETE PO) Smarty Pants with Fish Oil       No current facility-administered medications for this visit.        Allergies:    ALLERGIES:  No Known Allergies     Past Medical History:  No changes reported since last visit.    Family History:  No changes reported since last visit.    Social History:  No changes reported since last visit.    Review of Systems   Constitutional:  Negative for activity change and appetite change.   HENT:  Negative for congestion.    Respiratory:  Negative for cough.    Cardiovascular: Negative.    Gastrointestinal:  Negative for abdominal distention and abdominal pain.   Skin:  Negative for pallor and rash.   Neurological:  Negative for seizures and speech difficulty.   Psychiatric/Behavioral:   Positive for decreased concentration. Negative for behavioral problems and sleep disturbance. The patient is nervous/anxious. The patient is not hyperactive.        Physical Exam:  Pulse: 67  Wt: 75 lbs  Physical exam was not possible and this report is based on my conversation with the family.  Results should be interpreted accordingly.      Developmental questionnaires:    The NICHQ Ulen Assessment Scales are used by health care professionals to help diagnose ADHD in children between the ages of 6- and 12-years. Rodrigo's teacher completed this questions and the answers endorsed the following:  NICHQ Ulen Assessment Scales - Teacher Informant Follow up   Inattentive symptoms:  6  Hyperactive symptoms:  5  Average Performance Score for questions was 3     Impression:  Rodrigo Noriega is a 9 year old boy whose development and behavior meet criteria for the following diagnoses:    1. Autism spectrum disorder (CMD)    2. ADHD (attention deficit hyperactivity disorder), combined type    3. Anxiety disorder, unspecified type    Rodrigo Noriega benefits from the following home, community and school-based interventions.  Close follow up is recommended.    Recommendations:    Medical:  Increase Focalin XR to 15 mg . The prescription was sent to the preferred pharmacy. Benefits and side effects of the medication were discussed as was the importance of close developmental follow up.     Continue sertraline 0.5 mL  (10 mg) once daily as previously prescribed.       School: Continue with current [facility] services.     Questionnaires: Ulen follow-up forms to be completed by teacher and parent before next appointment.     Follow up with Dr. Packer in October (VV)    LOS: medication management visit.    If you have questions, [facility] call [facility]  [address]  [facility] [address]  Dept Phone: [phone]    Sincerely,    Billie Packer MD    Cc: primary  care physician

## 2025-06-30 NOTE — PROGRESS NOTES
Cassidy Osorio is a 74 y.o. female on day 7 of admission presenting with Brain lesion.    Subjective   No events overnight    Objective     Physical Exam  AOx3  RUE 5/5  RLE 5/5  LUE 4  LLE prox 3 dist 4  L pronator drift    Last Recorded Vitals  Blood pressure 132/80, pulse 52, temperature 35.9 °C (96.6 °F), temperature source Temporal, resp. rate 16, SpO2 96%.  Intake/Output last 3 Shifts:  I/O last 3 completed shifts:  In: 1030 [P.O.:1030]  Out: 625 [Urine:625]    Relevant Results        Results for orders placed or performed during the hospital encounter of 06/23/25 (from the past 24 hours)   POCT GLUCOSE   Result Value Ref Range    POCT Glucose 258 (H) 74 - 99 mg/dL   POCT GLUCOSE   Result Value Ref Range    POCT Glucose 312 (H) 74 - 99 mg/dL   Renal function panel   Result Value Ref Range    Glucose 271 (H) 74 - 99 mg/dL    Sodium 133 (L) 136 - 145 mmol/L    Potassium 3.4 (L) 3.5 - 5.3 mmol/L    Chloride 102 98 - 107 mmol/L    Bicarbonate 22 21 - 32 mmol/L    Anion Gap 12 10 - 20 mmol/L    Urea Nitrogen 20 6 - 23 mg/dL    Creatinine 0.62 0.50 - 1.05 mg/dL    eGFR >90 >60 mL/min/1.73m*2    Calcium 8.2 (L) 8.6 - 10.6 mg/dL    Phosphorus 2.0 (L) 2.5 - 4.9 mg/dL    Albumin 2.8 (L) 3.4 - 5.0 g/dL     Assessment & Plan  Brain lesion    Cassidy Osorio is a 74 y.o. female h/o HTN, HLD, Afib on Eliquis, aortic stenosis (1/2025 TTE EF 65-70%), DVT/PE (2023), DMT2, presented to [facility] ED on 5/22 x10d of L sided weakness/numbness and ataxia, MR/MRA no vascular abnormality, R temporoparietal enhancement w sig edema, 6/23 p/w workup for intracranial mass, CT CAP neg  6/25 MRI PPF stable R temporoparietal and parietal lobe enhancing lesions likely neoplasm  6/27 BLE DVT US neg     Plan:  - floor  - OR Tues 7/1 R brain bx   - Perioperative medicine recs - 800 ml fluid restriction, acceptable to proceed, metop AM of surgery  - Hold Eliquis will discuss restart plan after OR   - con't dex 4q12, PPI  - SCDs, SQH  - PTOT evaluation  post-op    Cole Vazquez MD

## 2025-06-30 NOTE — PROGRESS NOTES
Physical Therapy                 Therapy Communication Note    Patient Name: Cassidy Osorio  MRN: [id number]  Department: [place] [address]  Room: [address] [place], [place] [address]A  Today's Date: 6/30/2025     Discipline: Physical Therapy    Missed Visit: PT Missed Visit: Yes     Missed Visit Reason: Missed Visit Reason: Other (Comment) (Pt to go to OR tomorrow. Will follow up post-op.)    Missed Time: Attempt

## 2025-07-01 ENCOUNTER — APPOINTMENT (OUTPATIENT)
Dept: RADIOLOGY | Facility: HOSPITAL | Age: 75
End: 2025-07-01
Payer: COMMERCIAL

## 2025-07-01 ENCOUNTER — ANESTHESIA (OUTPATIENT)
Dept: OPERATING ROOM | Facility: HOSPITAL | Age: 75
End: 2025-07-01
Payer: COMMERCIAL

## 2025-07-01 LAB
ALBUMIN SERPL BCP-MCNC: 2.6 G/DL (ref 3.4–5)
ANION GAP SERPL CALC-SCNC: 11 MMOL/L (ref 10–20)
BUN SERPL-MCNC: 28 MG/DL (ref 6–23)
CALCIUM SERPL-MCNC: 7.9 MG/DL (ref 8.6–10.6)
CHLORIDE SERPL-SCNC: 105 MMOL/L (ref 98–107)
CO2 SERPL-SCNC: 20 MMOL/L (ref 21–32)
CREAT SERPL-MCNC: 0.41 MG/DL (ref 0.5–1.05)
EGFRCR SERPLBLD CKD-EPI 2021: >90 ML/MIN/1.73M*2
ERYTHROCYTE [DISTWIDTH] IN BLOOD BY AUTOMATED COUNT: 13.8 % (ref 11.5–14.5)
GLUCOSE BLD MANUAL STRIP-MCNC: 138 MG/DL (ref 74–99)
GLUCOSE BLD MANUAL STRIP-MCNC: 165 MG/DL (ref 74–99)
GLUCOSE BLD MANUAL STRIP-MCNC: 170 MG/DL (ref 74–99)
GLUCOSE BLD MANUAL STRIP-MCNC: 186 MG/DL (ref 74–99)
GLUCOSE SERPL-MCNC: 198 MG/DL (ref 74–99)
HCT VFR BLD AUTO: 36.3 % (ref 36–46)
HGB BLD-MCNC: 12.6 G/DL (ref 12–16)
HOLD SPECIMEN: NORMAL
MCH RBC QN AUTO: 30.6 PG (ref 26–34)
MCHC RBC AUTO-ENTMCNC: 34.7 G/DL (ref 32–36)
MCV RBC AUTO: 88 FL (ref 80–100)
NRBC BLD-RTO: 0.3 /100 WBCS (ref 0–0)
PHOSPHATE SERPL-MCNC: 3 MG/DL (ref 2.5–4.9)
PLATELET # BLD AUTO: 106 X10*3/UL (ref 150–450)
POTASSIUM SERPL-SCNC: 3.8 MMOL/L (ref 3.5–5.3)
RBC # BLD AUTO: 4.12 X10*6/UL (ref 4–5.2)
SODIUM SERPL-SCNC: 132 MMOL/L (ref 136–145)
WBC # BLD AUTO: 7.8 X10*3/UL (ref 4.4–11.3)

## 2025-07-01 PROCEDURE — 2500000004 HC RX 250 GENERAL PHARMACY W/ HCPCS (ALT 636 FOR OP/ED): Mod: JW

## 2025-07-01 PROCEDURE — 00B00ZX EXCISION OF BRAIN, OPEN APPROACH, DIAGNOSTIC: ICD-10-PCS | Performed by: NEUROLOGICAL SURGERY

## 2025-07-01 PROCEDURE — 70450 CT HEAD/BRAIN W/O DYE: CPT

## 2025-07-01 PROCEDURE — 2500000004 HC RX 250 GENERAL PHARMACY W/ HCPCS (ALT 636 FOR OP/ED)

## 2025-07-01 PROCEDURE — 8E09XBZ COMPUTER ASSISTED PROCEDURE OF HEAD AND NECK REGION: ICD-10-PCS | Performed by: NEUROLOGICAL SURGERY

## 2025-07-01 PROCEDURE — 2500000001 HC RX 250 WO HCPCS SELF ADMINISTERED DRUGS (ALT 637 FOR MEDICARE OP): Performed by: NURSE PRACTITIONER

## 2025-07-01 PROCEDURE — 70450 CT HEAD/BRAIN W/O DYE: CPT | Performed by: RADIOLOGY

## 2025-07-01 PROCEDURE — 2500000002 HC RX 250 W HCPCS SELF ADMINISTERED DRUGS (ALT 637 FOR MEDICARE OP, ALT 636 FOR OP/ED)

## 2025-07-01 PROCEDURE — 85027 COMPLETE CBC AUTOMATED: CPT

## 2025-07-01 PROCEDURE — 61781 SCAN PROC CRANIAL INTRA: CPT | Performed by: NEUROLOGICAL SURGERY

## 2025-07-01 PROCEDURE — 88307 TISSUE EXAM BY PATHOLOGIST: CPT | Mod: TC,SUR | Performed by: NEUROLOGICAL SURGERY

## 2025-07-01 PROCEDURE — 7100000001 HC RECOVERY ROOM TIME - INITIAL BASE CHARGE: Performed by: NEUROLOGICAL SURGERY

## 2025-07-01 PROCEDURE — 2500000005 HC RX 250 GENERAL PHARMACY W/O HCPCS: Performed by: NEUROLOGICAL SURGERY

## 2025-07-01 PROCEDURE — 88341 IMHCHEM/IMCYTCHM EA ADD ANTB: CPT | Performed by: PATHOLOGY

## 2025-07-01 PROCEDURE — 3700000001 HC GENERAL ANESTHESIA TIME - INITIAL BASE CHARGE: Performed by: NEUROLOGICAL SURGERY

## 2025-07-01 PROCEDURE — 3600000017 HC OR TIME - EACH INCREMENTAL 1 MINUTE - PROCEDURE LEVEL SIX: Performed by: NEUROLOGICAL SURGERY

## 2025-07-01 PROCEDURE — 2500000004 HC RX 250 GENERAL PHARMACY W/ HCPCS (ALT 636 FOR OP/ED): Performed by: NEUROLOGICAL SURGERY

## 2025-07-01 PROCEDURE — 84520 ASSAY OF UREA NITROGEN: CPT

## 2025-07-01 PROCEDURE — 2500000001 HC RX 250 WO HCPCS SELF ADMINISTERED DRUGS (ALT 637 FOR MEDICARE OP)

## 2025-07-01 PROCEDURE — A61750 PR STEREOTACTIC BRAIN BX,ASPIR,EXC: Performed by: ANESTHESIOLOGY

## 2025-07-01 PROCEDURE — 88307 TISSUE EXAM BY PATHOLOGIST: CPT | Performed by: PATHOLOGY

## 2025-07-01 PROCEDURE — 1100000001 HC PRIVATE ROOM DAILY

## 2025-07-01 PROCEDURE — 2780000003 HC OR 278 NO HCPCS: Performed by: NEUROLOGICAL SURGERY

## 2025-07-01 PROCEDURE — 61140 BURR HOLE/TREPH BX BRAIN/LES: CPT | Performed by: NEUROLOGICAL SURGERY

## 2025-07-01 PROCEDURE — 82947 ASSAY GLUCOSE BLOOD QUANT: CPT

## 2025-07-01 PROCEDURE — 88331 PATH CONSLTJ SURG 1 BLK 1SPC: CPT | Performed by: PATHOLOGY

## 2025-07-01 PROCEDURE — 99100 ANES PT EXTEME AGE<1 YR&>70: CPT | Performed by: ANESTHESIOLOGY

## 2025-07-01 PROCEDURE — 2720000007 HC OR 272 NO HCPCS: Performed by: NEUROLOGICAL SURGERY

## 2025-07-01 PROCEDURE — 3700000002 HC GENERAL ANESTHESIA TIME - EACH INCREMENTAL 1 MINUTE: Performed by: NEUROLOGICAL SURGERY

## 2025-07-01 PROCEDURE — 88342 IMHCHEM/IMCYTCHM 1ST ANTB: CPT | Performed by: PATHOLOGY

## 2025-07-01 PROCEDURE — 7100000002 HC RECOVERY ROOM TIME - EACH INCREMENTAL 1 MINUTE: Performed by: NEUROLOGICAL SURGERY

## 2025-07-01 PROCEDURE — 2500000001 HC RX 250 WO HCPCS SELF ADMINISTERED DRUGS (ALT 637 FOR MEDICARE OP): Performed by: PHYSICIAN ASSISTANT

## 2025-07-01 PROCEDURE — 3600000018 HC OR TIME - INITIAL BASE CHARGE - PROCEDURE LEVEL SIX: Performed by: NEUROLOGICAL SURGERY

## 2025-07-01 RX ORDER — SODIUM CHLORIDE, SODIUM LACTATE, POTASSIUM CHLORIDE, CALCIUM CHLORIDE 600; 310; 30; 20 MG/100ML; MG/100ML; MG/100ML; MG/100ML
50 INJECTION, SOLUTION INTRAVENOUS CONTINUOUS
Status: DISCONTINUED | OUTPATIENT
Start: 2025-07-01 | End: 2025-07-01 | Stop reason: HOSPADM

## 2025-07-01 RX ORDER — LIDOCAINE HYDROCHLORIDE 20 MG/ML
INJECTION, SOLUTION INFILTRATION; PERINEURAL AS NEEDED
Status: DISCONTINUED | OUTPATIENT
Start: 2025-07-01 | End: 2025-07-01

## 2025-07-01 RX ORDER — ROCURONIUM BROMIDE 10 MG/ML
INJECTION, SOLUTION INTRAVENOUS AS NEEDED
Status: DISCONTINUED | OUTPATIENT
Start: 2025-07-01 | End: 2025-07-01

## 2025-07-01 RX ORDER — ACETAMINOPHEN 650 MG/1
650 SUPPOSITORY RECTAL EVERY 4 HOURS PRN
Status: DISCONTINUED | OUTPATIENT
Start: 2025-07-01 | End: 2025-07-03 | Stop reason: HOSPADM

## 2025-07-01 RX ORDER — FENTANYL CITRATE 50 UG/ML
12.5 INJECTION, SOLUTION INTRAMUSCULAR; INTRAVENOUS EVERY 5 MIN PRN
Status: DISCONTINUED | OUTPATIENT
Start: 2025-07-01 | End: 2025-07-01 | Stop reason: HOSPADM

## 2025-07-01 RX ORDER — ACETAMINOPHEN 160 MG/5ML
650 SOLUTION ORAL EVERY 4 HOURS PRN
Status: DISCONTINUED | OUTPATIENT
Start: 2025-07-01 | End: 2025-07-03 | Stop reason: HOSPADM

## 2025-07-01 RX ORDER — LIDOCAINE HYDROCHLORIDE 10 MG/ML
0.1 INJECTION, SOLUTION INFILTRATION; PERINEURAL ONCE
Status: DISCONTINUED | OUTPATIENT
Start: 2025-07-01 | End: 2025-07-01 | Stop reason: HOSPADM

## 2025-07-01 RX ORDER — LABETALOL HYDROCHLORIDE 5 MG/ML
5 INJECTION, SOLUTION INTRAVENOUS ONCE AS NEEDED
Status: DISCONTINUED | OUTPATIENT
Start: 2025-07-01 | End: 2025-07-01 | Stop reason: HOSPADM

## 2025-07-01 RX ORDER — FENTANYL CITRATE 50 UG/ML
INJECTION, SOLUTION INTRAMUSCULAR; INTRAVENOUS AS NEEDED
Status: DISCONTINUED | OUTPATIENT
Start: 2025-07-01 | End: 2025-07-01

## 2025-07-01 RX ORDER — OXYCODONE HYDROCHLORIDE 10 MG/1
10 TABLET ORAL EVERY 4 HOURS PRN
Status: DISCONTINUED | OUTPATIENT
Start: 2025-07-01 | End: 2025-07-03 | Stop reason: HOSPADM

## 2025-07-01 RX ORDER — GLYCOPYRROLATE 0.2 MG/ML
INJECTION INTRAMUSCULAR; INTRAVENOUS AS NEEDED
Status: DISCONTINUED | OUTPATIENT
Start: 2025-07-01 | End: 2025-07-01

## 2025-07-01 RX ORDER — PROPOFOL 10 MG/ML
INJECTION, EMULSION INTRAVENOUS AS NEEDED
Status: DISCONTINUED | OUTPATIENT
Start: 2025-07-01 | End: 2025-07-01

## 2025-07-01 RX ORDER — OXYCODONE HYDROCHLORIDE 5 MG/1
5 TABLET ORAL EVERY 4 HOURS PRN
Status: DISCONTINUED | OUTPATIENT
Start: 2025-07-01 | End: 2025-07-03 | Stop reason: HOSPADM

## 2025-07-01 RX ORDER — DROPERIDOL 2.5 MG/ML
0.62 INJECTION, SOLUTION INTRAMUSCULAR; INTRAVENOUS ONCE AS NEEDED
Status: DISCONTINUED | OUTPATIENT
Start: 2025-07-01 | End: 2025-07-01 | Stop reason: HOSPADM

## 2025-07-01 RX ORDER — ONDANSETRON HYDROCHLORIDE 2 MG/ML
INJECTION, SOLUTION INTRAVENOUS AS NEEDED
Status: DISCONTINUED | OUTPATIENT
Start: 2025-07-01 | End: 2025-07-01

## 2025-07-01 RX ORDER — LIDOCAINE HYDROCHLORIDE AND EPINEPHRINE 5; 5 MG/ML; UG/ML
INJECTION, SOLUTION INFILTRATION; PERINEURAL AS NEEDED
Status: DISCONTINUED | OUTPATIENT
Start: 2025-07-01 | End: 2025-07-01 | Stop reason: HOSPADM

## 2025-07-01 RX ORDER — HYDROMORPHONE HYDROCHLORIDE 0.2 MG/ML
0.2 INJECTION INTRAMUSCULAR; INTRAVENOUS; SUBCUTANEOUS EVERY 5 MIN PRN
Status: DISCONTINUED | OUTPATIENT
Start: 2025-07-01 | End: 2025-07-01 | Stop reason: HOSPADM

## 2025-07-01 RX ORDER — ESMOLOL HYDROCHLORIDE 10 MG/ML
INJECTION INTRAVENOUS AS NEEDED
Status: DISCONTINUED | OUTPATIENT
Start: 2025-07-01 | End: 2025-07-01

## 2025-07-01 RX ORDER — CEFAZOLIN 1 G/1
INJECTION, POWDER, FOR SOLUTION INTRAVENOUS AS NEEDED
Status: DISCONTINUED | OUTPATIENT
Start: 2025-07-01 | End: 2025-07-01

## 2025-07-01 RX ORDER — BACITRACIN 500 [USP'U]/G
OINTMENT TOPICAL AS NEEDED
Status: DISCONTINUED | OUTPATIENT
Start: 2025-07-01 | End: 2025-07-01 | Stop reason: HOSPADM

## 2025-07-01 RX ORDER — ACETAMINOPHEN 325 MG/1
650 TABLET ORAL EVERY 4 HOURS PRN
Status: DISCONTINUED | OUTPATIENT
Start: 2025-07-01 | End: 2025-07-03 | Stop reason: HOSPADM

## 2025-07-01 RX ADMIN — GLYCOPYRROLATE 0.2 MG: 0.2 SOLUTION INTRAMUSCULAR; INTRAVENOUS at 17:36

## 2025-07-01 RX ADMIN — SODIUM CHLORIDE: 9 INJECTION, SOLUTION INTRAVENOUS at 15:58

## 2025-07-01 RX ADMIN — FENTANYL CITRATE 25 MCG: 50 INJECTION, SOLUTION INTRAMUSCULAR; INTRAVENOUS at 18:22

## 2025-07-01 RX ADMIN — LIDOCAINE HYDROCHLORIDE 50 MG: 20 INJECTION, SOLUTION INFILTRATION; PERINEURAL at 15:58

## 2025-07-01 RX ADMIN — DEXAMETHASONE SODIUM PHOSPHATE 4 MG: 4 INJECTION INTRA-ARTICULAR; INTRALESIONAL; INTRAMUSCULAR; INTRAVENOUS; SOFT TISSUE at 16:22

## 2025-07-01 RX ADMIN — ESCITALOPRAM OXALATE 10 MG: 10 TABLET ORAL at 08:06

## 2025-07-01 RX ADMIN — FENTANYL CITRATE 50 MCG: 50 INJECTION, SOLUTION INTRAMUSCULAR; INTRAVENOUS at 15:58

## 2025-07-01 RX ADMIN — INSULIN LISPRO 3 UNITS: 100 INJECTION, SOLUTION INTRAVENOUS; SUBCUTANEOUS at 08:06

## 2025-07-01 RX ADMIN — METOPROLOL TARTRATE 50 MG: 50 TABLET, FILM COATED ORAL at 08:06

## 2025-07-01 RX ADMIN — ESMOLOL HYDROCHLORIDE 20 MG: 10 INJECTION, SOLUTION INTRAVENOUS at 16:31

## 2025-07-01 RX ADMIN — PROPOFOL 30 MG: 10 INJECTION, EMULSION INTRAVENOUS at 16:31

## 2025-07-01 RX ADMIN — PROPOFOL 20 MG: 10 INJECTION, EMULSION INTRAVENOUS at 16:33

## 2025-07-01 RX ADMIN — DEXAMETHASONE 4 MG: 4 TABLET ORAL at 21:20

## 2025-07-01 RX ADMIN — ROCURONIUM BROMIDE 40 MG: 10 INJECTION INTRAVENOUS at 15:58

## 2025-07-01 RX ADMIN — PROPOFOL 80 MG: 10 INJECTION, EMULSION INTRAVENOUS at 15:58

## 2025-07-01 RX ADMIN — ROSUVASTATIN CALCIUM 40 MG: 20 TABLET, FILM COATED ORAL at 08:06

## 2025-07-01 RX ADMIN — INSULIN LISPRO 3 UNITS: 100 INJECTION, SOLUTION INTRAVENOUS; SUBCUTANEOUS at 11:48

## 2025-07-01 RX ADMIN — GLYCOPYRROLATE 0.1 MG: 0.2 SOLUTION INTRAMUSCULAR; INTRAVENOUS at 16:25

## 2025-07-01 RX ADMIN — SODIUM CHLORIDE 50 ML/HR: 0.9 INJECTION, SOLUTION INTRAVENOUS at 00:47

## 2025-07-01 RX ADMIN — GLYCOPYRROLATE 0.1 MG: 0.2 SOLUTION INTRAMUSCULAR; INTRAVENOUS at 16:28

## 2025-07-01 RX ADMIN — ROCURONIUM BROMIDE 10 MG: 10 INJECTION INTRAVENOUS at 17:46

## 2025-07-01 RX ADMIN — DEXAMETHASONE 4 MG: 4 TABLET ORAL at 08:06

## 2025-07-01 RX ADMIN — ESMOLOL HYDROCHLORIDE 20 MG: 10 INJECTION, SOLUTION INTRAVENOUS at 16:33

## 2025-07-01 RX ADMIN — SUGAMMADEX 200 MG: 100 INJECTION, SOLUTION INTRAVENOUS at 18:37

## 2025-07-01 RX ADMIN — ROCURONIUM BROMIDE 10 MG: 10 INJECTION INTRAVENOUS at 17:16

## 2025-07-01 RX ADMIN — PANTOPRAZOLE SODIUM 40 MG: 20 TABLET, DELAYED RELEASE ORAL at 05:52

## 2025-07-01 RX ADMIN — SENNOSIDES AND DOCUSATE SODIUM 1 TABLET: 50; 8.6 TABLET ORAL at 21:21

## 2025-07-01 RX ADMIN — CEFAZOLIN 2 G: 1 INJECTION, POWDER, FOR SOLUTION INTRAMUSCULAR; INTRAVENOUS at 17:00

## 2025-07-01 RX ADMIN — ONDANSETRON 4 MG: 2 INJECTION INTRAMUSCULAR; INTRAVENOUS at 18:23

## 2025-07-01 ASSESSMENT — COLUMBIA-SUICIDE SEVERITY RATING SCALE - C-SSRS
1. IN THE PAST MONTH, HAVE YOU WISHED YOU WERE DEAD OR WISHED YOU COULD GO TO SLEEP AND NOT WAKE UP?: NO
2. HAVE YOU ACTUALLY HAD ANY THOUGHTS OF KILLING YOURSELF?: NO
6. HAVE YOU EVER DONE ANYTHING, STARTED TO DO ANYTHING, OR PREPARED TO DO ANYTHING TO END YOUR LIFE?: NO

## 2025-07-01 ASSESSMENT — PAIN SCALES - GENERAL
PAINLEVEL_OUTOF10: 0 - NO PAIN
PAINLEVEL_OUTOF10: 0 - NO PAIN
PAIN_LEVEL: 0
PAINLEVEL_OUTOF10: 0 - NO PAIN

## 2025-07-01 ASSESSMENT — PAIN - FUNCTIONAL ASSESSMENT
PAIN_FUNCTIONAL_ASSESSMENT: 0-10

## 2025-07-01 ASSESSMENT — COGNITIVE AND FUNCTIONAL STATUS - GENERAL
CLIMB 3 TO 5 STEPS WITH RAILING: TOTAL
MOBILITY SCORE: 16
WALKING IN HOSPITAL ROOM: A LOT
TURNING FROM BACK TO SIDE WHILE IN FLAT BAD: A LITTLE
MOVING TO AND FROM BED TO CHAIR: A LITTLE
STANDING UP FROM CHAIR USING ARMS: A LITTLE

## 2025-07-01 NOTE — ANESTHESIA PROCEDURE NOTES
Airway  Date/Time: 7/1/2025 4:00 PM  Reason: elective    Airway not difficult    Staffing  Performed: resident   Authorized by: James Howe MD PhD    Performed by: Eliza Dennis MD  Patient location during procedure: OR    Patient Condition  Indications for airway management: anesthesia  Patient position: sniffing  Planned trial extubation  Sedation level: deep     Final Airway Details   Preoxygenated: yes  Final airway type: endotracheal airway  Successful airway: ETT  Cuffed: yes   Successful intubation technique: direct laryngoscopy  Adjuncts used in placement: intubating stylet  Endotracheal tube insertion site: oral  Blade: Rebecca  Blade size: #3  ETT size (mm): 7.0  Cormack-Lehane Classification: grade I - full view of glottis  Placement verified by: chest auscultation and capnometry   Inital cuff pressure (cm H2O): 5  Measured from: lips  ETT to lips (cm): 22  Number of attempts at approach: 1

## 2025-07-01 NOTE — PROGRESS NOTES
Physical Therapy                 Therapy Communication Note    Patient Name: Cassidy Osorio  MRN: [id number]  Department: [facility]  Room: [facility]  Today's Date: 7/1/2025     Discipline: Physical Therapy    Missed Visit: PT Missed Visit: Yes     Missed Visit Reason: Missed Visit Reason: Other (Comment) (Pt at OR. Will follow up post-op.)    Missed Time: Attempt

## 2025-07-01 NOTE — ANESTHESIA PREPROCEDURE EVALUATION
Patient: Cassidy Osorio    Procedure Information       Date/Time: 07/01/25 1752    Procedure: Right, BIOPSY, BRAIN, USING FRAMELESS STEREOTAXY (Right)    Location: [facility] OR 23 / [facility] OR    Surgeons: Jessica Mccloud MD            Relevant Problems   Cardiac   (+) Aortic stenosis   (+) HLD (hyperlipidemia)   (+) Hypertension   (+) Paroxysmal A-fib (Multi)   (+) Tricuspid valve disease      Endocrine   (+) Diabetes mellitus, type 2 (Multi)       Clinical information reviewed:   Tobacco  Allergies  Meds  Problems  Med Hx  Surg Hx   Fam Hx  Soc   Hx        NPO Detail:  NPO/Void Status  Carbohydrate Drink Given Prior to Surgery? : N  Date of Last Liquid: 07/01/25  Time of Last Liquid: 0000  Date of Last Solid: 07/01/25  Time of Last Solid: 0000  Last Intake Type: Clear fluids         Physical Exam    Airway  Mallampati: III  TM distance: >3 FB  Neck ROM: full  Mouth opening: 3 or more finger widths     Cardiovascular    Dental     (+) upper dentures, lower dentures     Pulmonary    Abdominal            Anesthesia Plan    History of general anesthesia?: yes  History of complications of general anesthesia?: no    ASA 4     general     Anesthetic plan and risks discussed with patient.  Use of blood products discussed with patient who consented to blood products.    Plan discussed with attending and resident.    Attending Anesthesiologist Note  Above information reviewed including relevant HPI, PMHx, PSHx, anesthesia history, labs, and imaging.    Summary (from patient interview and chart review):   74 y.o. 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 Sig Documenting Provider Last Dose Status   apixaban (Eliquis) 5 mg tablet [id number]  Take 1 tablet (5 mg) by mouth 2 times a day. Jeanne Colbert MD  Active   dexAMETHasone (Decadron) 4 mg tablet [id number]  Take 1 tablet (4 mg) by mouth every 12 hours. Josi Alcala MD  Active   escitalopram (Lexapro) 10 mg tablet [id number]  Take 1 tablet (10 mg) by mouth once daily. Josi Alcala MD  Active   metFORMIN XR (Glumetza) 1,000 mg 24 hr tablet [id number]  Take 1 tablet (1,000 mg) by mouth once daily in the evening. Take with meals. Do not crush, chew, or split.   Patient taking differently: Take 1 tablet (1,000 mg) by mouth 2 times daily (morning and late afternoon). Do not crush, chew, or split.    Josi Alcala MD  Active   metoprolol tartrate (Lopressor) 50 mg tablet [id number] Yes Take 1 tablet by mouth 2 times a day. Take half of a tablet two times per day   Patient taking differently: Take 1 tablet by mouth 2 times a day.    Jeanne Colbert MD  Active   rosuvastatin (Crestor) 40 mg tablet [id number]  Take 1 tablet (40 mg) by mouth once daily. Jeanne Colbert MD  Active                    Visit Vitals  /79   Pulse (!) 49   Temp 36 °C (96.8 °F) (Temporal)   Resp 16   Wt 70.3 kg (155 lb)   SpO2 95%   BMI 28.35 kg/m²   OB Status Postmenopausal   Smoking Status Never   BSA 1.75 m²            6/30/2025     8:45 PM 7/1/2025    12:09 AM 7/1/2025     3:56 AM 7/1/2025     8:00 AM 7/1/2025     8:15 AM 7/1/2025    11:45 AM 7/1/2025     1:26 PM   Vitals    Systolic 112 133 137 151  153 161   Diastolic 75 75 83 79  88 79   BP Location Left arm Left arm  Left arm  Left arm    Heart Rate 60 52 50 51 65 52 49   Temp 35.9 °C (96.6 °F) 36.4 °C (97.5 °F) 36 °C (96.8 °F) 36.5 °C (97.7 °F)  36.1 °C (97 °F) 36 °C (96.8 °F)   Resp 16 16 16 16  16 16   Weight (lb)       155   BMI       28.35 kg/m2   BSA (m2)       1.75 m2        Recent Labs:    Chemistry    Lab Results   Component Value Date/Time     (L) 07/01/2025 0828     05/21/2025 1531    K 3.8 07/01/2025 0828    K 3.7 05/21/2025 1531     07/01/2025 0828     05/21/2025 1531    CO2 20 (L) 07/01/2025 0828    CO2 26 05/21/2025 1531    BUN 28 (H) 07/01/2025 0828    BUN 17 05/21/2025 1531    CREATININE 0.41 (L) 07/01/2025 0828    CREATININE 0.63 05/21/2025 1531    Lab Results   Component Value Date/Time    CALCIUM 7.9 (L) 07/01/2025 0828    CALCIUM 9.3 05/21/2025 1531    ALKPHOS 58 05/22/2025 1040    ALKPHOS 64 05/21/2025 1531    AST 15 05/22/2025 1040    AST 17 05/21/2025 1531    ALT 17 05/22/2025 1040    ALT 17 05/21/2025 1531    BILITOT 0.6 05/22/2025 1040    BILITOT 0.6 05/21/2025 1531          Lab Results   Component Value Date/Time    WBC 7.8 07/01/2025 0828    WBC 5.8 05/21/2025 1531    HGB 12.6 07/01/2025 0828    HGB 13.1 05/21/2025 1531    HCT 36.3 07/01/2025 0828    HCT 40.0 05/21/2025 1531     (L) 07/01/2025 0828     05/21/2025 1531     Lab Results   Component Value Date/Time    PROTIME 11.5 06/30/2025 1047    INR 1.0 06/30/2025 1047       Electrocardiogram:  Encounter Date: 05/22/25   ECG 12 lead   Result Value    Ventricular Rate 61    Atrial Rate 61    CA Interval 112    QRS Duration 84    QT Interval 426    QTC Calculation(Bazett) 428    P Axis 4    R Axis 12    T Axis 19    QRS Count 10    Q Onset 217    P Onset 161    P Offset 207    T Offset 430    QTC Fredericia 428    Narrative    Normal sinus rhythm  Normal ECG  No previous ECGs available  Confirmed by Oj Joshua  ([id number]) on 5/25/2025 2:18:13 PM       Echocardiogram:  Results for orders placed in visit on 01/31/25    Transthoracic Echo Complete      Anesthesia History: Afib during previous anesthetic, otherwise No anesthesia complications in patient or family   Anesthesia Plan: GA/ETT, standard monitors     I discussed the anesthesia plan with the patient and/or family and reviewed the risks, benefits and alternatives. Agree to proceed.  James Howe MD PhD

## 2025-07-01 NOTE — CARE PLAN
The patient's goals for the shift include  rest    The clinical goals for the shift include Patient will remain safe and free from injury overnight.    Over the shift, the patient was safe and had a stable exam. Patient tearful overnight. No acute events.  Problem: Pain - Adult  Goal: Verbalizes/displays adequate comfort level or baseline comfort level  Outcome: Progressing     Problem: Safety - Adult  Goal: Free from fall injury  Outcome: Progressing     Problem: Discharge Planning  Goal: Discharge to home or other facility with appropriate resources  Outcome: Progressing     Problem: Chronic Conditions and Co-morbidities  Goal: Patient's chronic conditions and co-morbidity symptoms are monitored and maintained or improved  Outcome: Progressing     Problem: Nutrition  Goal: Nutrient intake appropriate for maintaining nutritional needs  Outcome: Progressing     Problem: Fall/Injury  Goal: Not fall by end of shift  Outcome: Met  Goal: Be free from injury by end of the shift  Outcome: Met

## 2025-07-01 NOTE — PROGRESS NOTES
TCC aware that patient is not medically cleared for discharge; patient to tentatively have surgery. Patient does have previous [facility] services with [facility]. Patient will continue to be discussed in multi-disciplinary rounds and monitored daily.  If any of the the above changes, TCC/SW will complete appropriate assessments and interventions.       PRAMOD Wagner, RN  [facility], [place] [address]  Transitional Care Coordinator, Mon-Fri  Cell: [phone], Office: [phone]  Email: [email or web address]

## 2025-07-01 NOTE — BRIEF OP NOTE
Date: 2025 - 2025  OR Location: [facility] OR    Name: Cassidy Osorio YOB: 1950, Age: 74 y.o., MRN: [id number], Sex: female    Diagnosis  Pre-op Diagnosis      * Brain lesion [G93.9] Post-op Diagnosis     * Brain lesion [G93.9]     Procedures  Right, BIOPSY, BRAIN, USING FRAMELESS STEREOTAXY  48372 - [place] STEREOTACTIC BX ASPIR/EXC JAMISON INTRACRANIAL LES      Surgeons      * Jessica Mccloud - Primary    Resident/Fellow/Other Assistant:  Surgeons and Role:     * Kelly Petty MD - Resident - Assisting    Staff:   Circulator: Rosita  Scrub Person: Nathaly    Anesthesia Staff: Anesthesiologist: James Howe MD PhD  Anesthesia Resident: Osiris Mock DO; Eliza Dennis MD; Kezia Kwong MD    Procedure Summary  Anesthesia: Anesthesia type not filed in the log.  ASA: IV  Estimated Blood Loss: 3mL  Intra-op Medications:   Administrations occurring from  to  on 25:   Medication Name Total Dose   apixaban (Eliquis) tablet 5 mg Cannot be calculated   dexAMETHasone (Decadron) tablet 4 mg Cannot be calculated   fentaNYL (Sublimaze) injection 50 mcg/mL 25 mcg   metoprolol tartrate (Lopressor) tablet 50 mg Cannot be calculated   ondansetron (Zofran) 2 mg/mL injection 4 mg   sennosides-docusate sodium (Ana Laura-Colace) 8.6-50 mg per tablet 1 tablet Cannot be calculated              Anesthesia Record               Intraprocedure I/O Totals       None           Specimen:   ID Type Source Tests Collected by Time   1 : RIGHT BRAIN MASS Tissue BRAIN BIOPSY SURGICAL PATHOLOGY EXAM Jessica Mccloud MD 2025 1720        Findings: prelim path: glioma    Complications:  None; patient tolerated the procedure well.     Disposition: PACU - hemodynamically stable.  Condition: stable  Specimens Collected:   ID Type Source Tests Collected by Time   1 : RIGHT BRAIN MASS Tissue BRAIN BIOPSY SURGICAL PATHOLOGY EXAM Jessica Mccloud MD 2025 1720     Attending Attestation:     Jessica Mccloud  Phone Number:  [phone]

## 2025-07-01 NOTE — ANESTHESIA POSTPROCEDURE EVALUATION
Patient: Cassidy Osorio    Procedure Summary       Date: 07/01/25 Room / Location: [facility] OR 23 / [facility] OR    Anesthesia Start: 1553 Anesthesia Stop: 1859    Procedure: Right, BIOPSY, BRAIN, USING FRAMELESS STEREOTAXY (Right) Diagnosis:       Brain lesion      (Brain lesion [G93.9])    Surgeons: Jessica Mccloud MD Responsible Provider: James Howe MD PhD    Anesthesia Type: general ASA Status: 4            Anesthesia Type: general    Vitals Value Taken Time   /81 07/01/25 19:00   Temp 36.0C 07/01/25 19:07   Pulse 58 07/01/25 19:00   Resp 11 07/01/25 19:00   SpO2 97 % 07/01/25 19:00   Vitals shown include unfiled device data.    Anesthesia Post Evaluation    Patient location during evaluation: PACU  Patient participation: complete - patient participated  Level of consciousness: awake and awake and alert  Pain score: 0  Pain management: adequate  Multimodal analgesia pain management approach  Airway patency: patent  Cardiovascular status: acceptable and hypertensive  Respiratory status: acceptable, airway suctioned and face mask  Hydration status: acceptable  Postoperative Nausea and Vomiting: none        No notable events documented.

## 2025-07-02 ENCOUNTER — ANESTHESIA EVENT (OUTPATIENT)
Dept: OPERATING ROOM | Facility: HOSPITAL | Age: 75
End: 2025-07-02

## 2025-07-02 LAB
ALBUMIN SERPL BCP-MCNC: 2.9 G/DL (ref 3.4–5)
ANION GAP SERPL CALC-SCNC: 13 MMOL/L (ref 10–20)
BUN SERPL-MCNC: 22 MG/DL (ref 6–23)
CALCIUM SERPL-MCNC: 8.5 MG/DL (ref 8.6–10.6)
CHLORIDE SERPL-SCNC: 105 MMOL/L (ref 98–107)
CO2 SERPL-SCNC: 22 MMOL/L (ref 21–32)
CREAT SERPL-MCNC: 0.59 MG/DL (ref 0.5–1.05)
EGFRCR SERPLBLD CKD-EPI 2021: >90 ML/MIN/1.73M*2
ERYTHROCYTE [DISTWIDTH] IN BLOOD BY AUTOMATED COUNT: 13.8 % (ref 11.5–14.5)
GLUCOSE BLD MANUAL STRIP-MCNC: 163 MG/DL (ref 74–99)
GLUCOSE BLD MANUAL STRIP-MCNC: 223 MG/DL (ref 74–99)
GLUCOSE BLD MANUAL STRIP-MCNC: 230 MG/DL (ref 74–99)
GLUCOSE BLD MANUAL STRIP-MCNC: 405 MG/DL (ref 74–99)
GLUCOSE SERPL-MCNC: 227 MG/DL (ref 74–99)
HCT VFR BLD AUTO: 38.1 % (ref 36–46)
HGB BLD-MCNC: 13.3 G/DL (ref 12–16)
MCH RBC QN AUTO: 30.6 PG (ref 26–34)
MCHC RBC AUTO-ENTMCNC: 34.9 G/DL (ref 32–36)
MCV RBC AUTO: 88 FL (ref 80–100)
NRBC BLD-RTO: 0 /100 WBCS (ref 0–0)
PHOSPHATE SERPL-MCNC: 3 MG/DL (ref 2.5–4.9)
PLATELET # BLD AUTO: 122 X10*3/UL (ref 150–450)
POTASSIUM SERPL-SCNC: 3.5 MMOL/L (ref 3.5–5.3)
RBC # BLD AUTO: 4.35 X10*6/UL (ref 4–5.2)
SODIUM SERPL-SCNC: 136 MMOL/L (ref 136–145)
WBC # BLD AUTO: 8.2 X10*3/UL (ref 4.4–11.3)

## 2025-07-02 PROCEDURE — 80069 RENAL FUNCTION PANEL: CPT

## 2025-07-02 PROCEDURE — 97116 GAIT TRAINING THERAPY: CPT | Mod: GP

## 2025-07-02 PROCEDURE — 2500000002 HC RX 250 W HCPCS SELF ADMINISTERED DRUGS (ALT 637 FOR MEDICARE OP, ALT 636 FOR OP/ED): Performed by: NURSE PRACTITIONER

## 2025-07-02 PROCEDURE — 2500000004 HC RX 250 GENERAL PHARMACY W/ HCPCS (ALT 636 FOR OP/ED)

## 2025-07-02 PROCEDURE — 2500000002 HC RX 250 W HCPCS SELF ADMINISTERED DRUGS (ALT 637 FOR MEDICARE OP, ALT 636 FOR OP/ED)

## 2025-07-02 PROCEDURE — 99223 1ST HOSP IP/OBS HIGH 75: CPT

## 2025-07-02 PROCEDURE — 85027 COMPLETE CBC AUTOMATED: CPT

## 2025-07-02 PROCEDURE — 97110 THERAPEUTIC EXERCISES: CPT | Mod: GP

## 2025-07-02 PROCEDURE — 99232 SBSQ HOSP IP/OBS MODERATE 35: CPT | Performed by: NURSE PRACTITIONER

## 2025-07-02 PROCEDURE — 2500000001 HC RX 250 WO HCPCS SELF ADMINISTERED DRUGS (ALT 637 FOR MEDICARE OP)

## 2025-07-02 PROCEDURE — 36415 COLL VENOUS BLD VENIPUNCTURE: CPT

## 2025-07-02 PROCEDURE — 82947 ASSAY GLUCOSE BLOOD QUANT: CPT

## 2025-07-02 PROCEDURE — 1100000001 HC PRIVATE ROOM DAILY

## 2025-07-02 RX ORDER — DEXAMETHASONE 4 MG/1
TABLET ORAL EVERY 12 HOURS SCHEDULED
Status: CANCELLED | OUTPATIENT
Start: 2025-07-02

## 2025-07-02 RX ORDER — INSULIN GLARGINE 100 [IU]/ML
18 INJECTION, SOLUTION SUBCUTANEOUS EVERY 24 HOURS
Status: DISCONTINUED | OUTPATIENT
Start: 2025-07-03 | End: 2025-07-03 | Stop reason: HOSPADM

## 2025-07-02 RX ORDER — INSULIN LISPRO 100 [IU]/ML
20 INJECTION, SOLUTION INTRAVENOUS; SUBCUTANEOUS ONCE
Status: COMPLETED | OUTPATIENT
Start: 2025-07-02 | End: 2025-07-02

## 2025-07-02 RX ADMIN — INSULIN LISPRO 20 UNITS: 100 INJECTION, SOLUTION INTRAVENOUS; SUBCUTANEOUS at 12:49

## 2025-07-02 RX ADMIN — SENNOSIDES AND DOCUSATE SODIUM 1 TABLET: 50; 8.6 TABLET ORAL at 20:03

## 2025-07-02 RX ADMIN — INSULIN GLARGINE 14 UNITS: 100 INJECTION, SOLUTION SUBCUTANEOUS at 08:45

## 2025-07-02 RX ADMIN — DEXAMETHASONE 4 MG: 4 TABLET ORAL at 08:44

## 2025-07-02 RX ADMIN — DEXAMETHASONE 4 MG: 4 TABLET ORAL at 20:03

## 2025-07-02 RX ADMIN — POLYETHYLENE GLYCOL 3350 17 G: 17 POWDER, FOR SOLUTION ORAL at 08:51

## 2025-07-02 RX ADMIN — INSULIN LISPRO 5 UNITS: 100 INJECTION, SOLUTION INTRAVENOUS; SUBCUTANEOUS at 08:45

## 2025-07-02 RX ADMIN — ESCITALOPRAM OXALATE 10 MG: 10 TABLET ORAL at 08:44

## 2025-07-02 RX ADMIN — INSULIN LISPRO 5 UNITS: 100 INJECTION, SOLUTION INTRAVENOUS; SUBCUTANEOUS at 12:51

## 2025-07-02 RX ADMIN — INSULIN LISPRO 6 UNITS: 100 INJECTION, SOLUTION INTRAVENOUS; SUBCUTANEOUS at 08:44

## 2025-07-02 RX ADMIN — INSULIN LISPRO 5 UNITS: 100 INJECTION, SOLUTION INTRAVENOUS; SUBCUTANEOUS at 17:17

## 2025-07-02 RX ADMIN — METOPROLOL TARTRATE 50 MG: 50 TABLET, FILM COATED ORAL at 20:03

## 2025-07-02 RX ADMIN — ROSUVASTATIN CALCIUM 40 MG: 20 TABLET, FILM COATED ORAL at 12:48

## 2025-07-02 RX ADMIN — INSULIN LISPRO 6 UNITS: 100 INJECTION, SOLUTION INTRAVENOUS; SUBCUTANEOUS at 17:17

## 2025-07-02 RX ADMIN — INSULIN LISPRO 15 UNITS: 100 INJECTION, SOLUTION INTRAVENOUS; SUBCUTANEOUS at 12:51

## 2025-07-02 RX ADMIN — PANTOPRAZOLE SODIUM 40 MG: 20 TABLET, DELAYED RELEASE ORAL at 06:07

## 2025-07-02 ASSESSMENT — COGNITIVE AND FUNCTIONAL STATUS - GENERAL
TURNING FROM BACK TO SIDE WHILE IN FLAT BAD: A LITTLE
STANDING UP FROM CHAIR USING ARMS: A LOT
TURNING FROM BACK TO SIDE WHILE IN FLAT BAD: A LITTLE
MOBILITY SCORE: 14
STANDING UP FROM CHAIR USING ARMS: TOTAL
MOVING FROM LYING ON BACK TO SITTING ON SIDE OF FLAT BED WITH BEDRAILS: A LITTLE
CLIMB 3 TO 5 STEPS WITH RAILING: TOTAL
CLIMB 3 TO 5 STEPS WITH RAILING: TOTAL
STANDING UP FROM CHAIR USING ARMS: A LITTLE
WALKING IN HOSPITAL ROOM: A LOT
CLIMB 3 TO 5 STEPS WITH RAILING: TOTAL
HELP NEEDED FOR BATHING: A LITTLE
PERSONAL GROOMING: A LOT
MOVING TO AND FROM BED TO CHAIR: TOTAL
DRESSING REGULAR LOWER BODY CLOTHING: A LITTLE
WALKING IN HOSPITAL ROOM: TOTAL
MOBILITY SCORE: 16
TOILETING: A LOT
MOVING TO AND FROM BED TO CHAIR: A LITTLE
WALKING IN HOSPITAL ROOM: A LOT
MOBILITY SCORE: 10
DAILY ACTIVITIY SCORE: 15
MOVING TO AND FROM BED TO CHAIR: A LOT
TURNING FROM BACK TO SIDE WHILE IN FLAT BAD: A LITTLE
DRESSING REGULAR UPPER BODY CLOTHING: A LITTLE
EATING MEALS: A LOT

## 2025-07-02 ASSESSMENT — PAIN SCALES - GENERAL
PAINLEVEL_OUTOF10: 0 - NO PAIN

## 2025-07-02 ASSESSMENT — PAIN - FUNCTIONAL ASSESSMENT
PAIN_FUNCTIONAL_ASSESSMENT: 0-10
PAIN_FUNCTIONAL_ASSESSMENT: 0-10

## 2025-07-02 NOTE — PROGRESS NOTES
Department of Neurosurgery  Daily Progress Note    Patient Name: Cassidy Osorio  MRN: [id number]  Date:  07/02/25     Subjective  No complaints    Objective  No acute events over night  Vital Signs  /83 (BP Location: Right arm, Patient Position: Lying)   Pulse 89   Temp 36.8 °C (98.2 °F) (Temporal)   Resp 18   Wt 70.3 kg (155 lb)   SpO2 96%   BMI 28.35 kg/m²      Physical Exam  Constitutional: awake, alert, no distress  HEENT:  right scalp incision clean, warm, dry; SLICK,; EOMI; tongue midline  Cardiovascular: Normal rate and regular rhythm.  Respiratory/Thorax: CTAB, regular respirations on RA. Good symmetric chest expansion.   Gastrointestinal: Abdomen soft, non tender.   Genitourinary: voiding without difficulty   Neurological: A&Ox3; follows commands;  FIGUEROA's; RUE/RLE 5/5; LUE4/5; LLE4/5; left pronator drift  Psychological: Appropriate mood and behavior.   Skin: Warm and dry.     Diagnostics   Results for orders placed or performed during the hospital encounter of 06/23/25 (from the past 24 hours)   POCT GLUCOSE   Result Value Ref Range    POCT Glucose 138 (H) 74 - 99 mg/dL   Renal Function Panel   Result Value Ref Range    Glucose 227 (H) 74 - 99 mg/dL    Sodium 136 136 - 145 mmol/L    Potassium 3.5 3.5 - 5.3 mmol/L    Chloride 105 98 - 107 mmol/L    Bicarbonate 22 21 - 32 mmol/L    Anion Gap 13 10 - 20 mmol/L    Urea Nitrogen 22 6 - 23 mg/dL    Creatinine 0.59 0.50 - 1.05 mg/dL    eGFR >90 >60 mL/min/1.73m*2    Calcium 8.5 (L) 8.6 - 10.6 mg/dL    Phosphorus 3.0 2.5 - 4.9 mg/dL    Albumin 2.9 (L) 3.4 - 5.0 g/dL   CBC   Result Value Ref Range    WBC 8.2 4.4 - 11.3 x10*3/uL    nRBC 0.0 0.0 - 0.0 /100 WBCs    RBC 4.35 4.00 - 5.20 x10*6/uL    Hemoglobin 13.3 12.0 - 16.0 g/dL    Hematocrit 38.1 36.0 - 46.0 %    MCV 88 80 - 100 fL    MCH 30.6 26.0 - 34.0 pg    MCHC 34.9 32.0 - 36.0 g/dL    RDW 13.8 11.5 - 14.5 %    Platelets 122 (L) 150 - 450 x10*3/uL   POCT GLUCOSE   Result Value Ref Range    POCT Glucose  223 (H) 74 - 99 mg/dL   POCT GLUCOSE   Result Value Ref Range    POCT Glucose 405 (H) 74 - 99 mg/dL   POCT GLUCOSE   Result Value Ref Range    POCT Glucose 230 (H) 74 - 99 mg/dL     Imaging  CT head wo IV contrast  Result Date: 7/2/2025  Postsurgical changes from interval biopsy of right temporoparietal region. Otherwise stable examination.   I personally reviewed the images/study and I agree with the findings as stated by resident Chaim Pearl. This study was interpreted at [place], [place].   MACRO: None   Signed by: Renetta Hills 7/2/2025 11:52 AM Dictation workstation:   [id number]    CT head wo IV contrast volumetric surgical planning  Result Date: 7/1/2025  Stable CT appearance of the brain. Persistent mass effect and irregular hypodensity within the right temporal and parietal lobes. No interval acute intracranial process.   I personally reviewed the images/study and I agree with the findings as stated by Satnam Qureshi DO PGY-III. This study was interpreted at [place], [place].   MACRO: None.   Signed by: Navin Subramanian 7/1/2025 6:55 AM Dictation workstation:   [id number]    XR chest 1 view  Result Date: 6/30/2025  Moderate reticular opacities in the perihilar regions, lung bases, likely atelectasis/chronic scarring. No acute pulmonary findings.   MACRO: None   Signed by: Rosie Mcclain 6/30/2025 2:26 PM Dictation workstation:   [id number]      Cardiology, Vascular, and Other Imaging  Vascular US lower extremity venous duplex bilateral  Result Date: 6/30/2025            [place] [id number]   Tel [phone] and Fax [phone]  Vascular Lab Report VASC US LOWER EXTREMITY VENOUS DUPLEX BILATERAL  Patient Name:      SANA GONZALEZ MICHAEL          Reading Physician:  [id number] Kenna Callaway MD, RPVI Study Date:        6/27/2025             Ordering Physician: [id number] ROBERTO MARTINEZ MRN/PID:           [id number]             Technologist:       Sulma Elmore T Accession#:        [id number]         Technologist 2: Date of Birth/Age: 1950 / 74 years Encounter#:         [id number] Gender:            F Admission Status:  Inpatient            Location Performed: [facility]  Diagnosis/ICD: Localized (leg) edema-R60.0 Indication:    Limb edema CPT Codes:     41739 Peripheral venous duplex scan for DVT complete  CONCLUSIONS: Right Lower Venous: No evidence of acute deep vein thrombus visualized in the right lower extremity. Left Lower Venous: No evidence of acute deep vein thrombus visualized in the left lower extremity.  Imaging & Doppler Findings:  Right                 Compressible Thrombus        Flow Distal External Iliac                       Spontaneous/Phasic CFV                       Yes        None   Spontaneous/Phasic PFV                       Yes        None FV Proximal               Yes        None   Spontaneous/Phasic FV Mid                    Yes        None FV Distal                 Yes        None Popliteal                 Yes        None   Spontaneous/Phasic Peroneal                  Yes        None PTV                       Yes        None  Left                  Compress Thrombus        Flow Distal External Iliac                   Spontaneous/Phasic CFV                     Yes      None   Spontaneous/Phasic PFV                     Yes      None FV Proximal             Yes      None   Spontaneous/Phasic FV Mid                  Yes      None FV Distal               Yes      None Popliteal               Yes      None   Spontaneous/Phasic Peroneal                Yes      None PTV                     Yes      None  [id number] Kenna Callaway MD, KYLE Electronically signed by [id number] Kenna Callaway MD, KYLE on 6/30/2025 at 8:26:48 AM  ** Final **         Current  Medications  Scheduled medications  Scheduled Medications[1]  Continuous medications  Continuous Medications[2]  PRN medications  PRN Medications[3]     Assessment/Plan  Cassidy Osorio is a 74 y.o. female with a past medical history of HTN, HLD, Afib on Eliquis, aortic stenosis (1/2025 TTE EF 65-70%), DVT/PE (2023), DMT2, presented to [facility] ED on 5/22 x10d of L sided weakness/numbness and ataxia,  MR/MRA no vascular abnormality, R temporoparietal enhancement w sig edema, 6/23 p/w for workup of intracranial mass and is s/p open right parietal brain biopsy .     #  Brain Lesion  S/P open right parietal brain biopsy  -Med Onc and Rad Onc consult  -continue Dexamethasone 4 mg every 12 hrs  -PT/OT eval  - Encourage ambulation post operatively  - Monitor VS/pulse ox [id number]   - Monitor AM CBC/RFP    # Acute postoperative pain  - Well controlled per patient, continue current regimen  - Tylenol 650mg PO Q4 hours as needed for Mild pain  - Oxycodone 5 mg/10mg PO [id number] PRN mod/severe pain   - Dilaudid 0.2mg IV [id number] PRN breakthrough pain   - Scheduled bowel regimen while using narcotics  - Pain assessments [id number]     # Hx HTN  # Hx Afib  # Hx Afib  - BP stable  -continues on Home Metoprolol 50 mg 2 times a day  -continues on Home Crestor 40 mg Daily  -Home Eliquis 5 mg 2 times a day on Hold  -monitor    # Hx DM II  -Elevated glucose due to Dexamethasone  -Lantus started at 14 units Daily; increase to 18 units  - started Lispro 5 units 3 times a day before meals  - Lispro insulin sliding scale #3  -Home Metformin on Hold  - monitor    # Hx DVT/PE  -Home Eliquis 5 mg 2 times a day on Hold; Resume after discussed at 2 week follow up     Prophylaxis:   - DVT:SCDs, encourage ambulation   - Encourage IS x10 every hour while awake     FEN/GI:  - Monitor/replete electrolytes PRN    - Continue regular  diet   - GI protection with Protonix 40mg daily   - Bowel regimen: Scheduled Miralax 17 g Daily and pericolace 2 tabs twice a  day       Disposition:   -PT/OT eval recommend low intensity; Home Care vs outpatient therapy  - Will need follow up with neurosurgery scheduled.     Patient evaluated with Neurosurgery Chief Dr. Kelly Petty, and plan discussed with Dr. Jessica MANCIA-[facility]  Neurologic Surgery   [place]  Team Pager #[phone]     Total face to face time spent with patient/family of > 40 minutes, with >50% of the time spent discussing plan of care/management, counseling/educating on disease processes, explaining results of diagnostic testing.         [1] [Held by provider] apixaban, 5 mg, oral, BID  dexAMETHasone, 4 mg, oral, q12h RODRIGO  escitalopram, 10 mg, oral, Daily  [Held by provider] heparin (porcine), 5,000 Units, subcutaneous, q8h  insulin glargine, 14 Units, subcutaneous, q24h  insulin lispro, 0-15 Units, subcutaneous, TID AC  insulin lispro, 5 Units, subcutaneous, TID AC  metoprolol tartrate, 50 mg, oral, BID  pantoprazole, 40 mg, oral, Daily before breakfast  polyethylene glycol, 17 g, oral, Daily  rosuvastatin, 40 mg, oral, Daily  sennosides-docusate sodium, 1 tablet, oral, Nightly  [2]    [3] PRN medications: acetaminophen **OR** acetaminophen **OR** acetaminophen, dextrose, dextrose, glucagon, glucagon, HYDROmorphone, oxyCODONE, oxyCODONE, oxygen

## 2025-07-02 NOTE — CARE PLAN
The patient's goals for the shift include  rest    The clinical goals for the shift include Patient will remain safe and free from injury overnight.    Over the shift, the patient had a stable exam and was safe and free from injury . No acute events overnight.  Problem: Pain - Adult  Goal: Verbalizes/displays adequate comfort level or baseline comfort level  Outcome: Progressing     Problem: Safety - Adult  Goal: Free from fall injury  Outcome: Progressing     Problem: Discharge Planning  Goal: Discharge to home or other facility with appropriate resources  Outcome: Progressing     Problem: Chronic Conditions and Co-morbidities  Goal: Patient's chronic conditions and co-morbidity symptoms are monitored and maintained or improved  Outcome: Progressing     Problem: Nutrition  Goal: Nutrient intake appropriate for maintaining nutritional needs  Outcome: Progressing

## 2025-07-02 NOTE — CARE PLAN
Transitional Care Coordinator Note: Patient discussed with medical team, per medical team patient is not medically ready.   Discharge dispo: HC.   Will need HC referral. ADOD 1-2 Days.  Trent Trujillo RN  Transitional Care Coordinator

## 2025-07-02 NOTE — CONSULTS
Name: Cassidy Osorio  MRN: [id number]  Admit Date: 6/23/2025  Encounter Date: 7/2/2025  PCP: Jeanne Colbert MD    Reason for consult: New R temporoparietal brain lesion/mass  Attending provider: Jessica Mccloud MD  Consult attending provider: Dr Song    Oncology Consult Note      History of Present Illness   Cassidy Osorio is a 74 y.o. female with PMHx of  A-fib on Eliquis, DVT/PE 2023, AS, HTN, HLD, T2DM, initially presented to [facility] [place] [facility] 5/22/25 with 10 days of worsening L sided weakness/numbness and ataxia (according to her, it started with L leg weakness (difficult to walk w/o an assistance) in 3/2025 and has been gradually worsening with weakness of L upper extremity). Brain MRIs (5/22, 23) showed R temporoparietal enhanced lesion with significant edema which initially thought to be possible evolving stroke vs underlying neoplasm but MR/MRA did NOT show any vascular abnormality and FU brain MRI perfusion (6/25/25) showed stable R temporoparietal/parietal enhancing lesions likely neoplasm (rather than evolving stroke) given persistence over a month. She got brain Bx on 7/1 and preliminary report shows high-grade glioma for which Oncology was consulted.    Onc History   Reviewed    Oncology History    No history exists.       Past Medical History   Medical History[1]      Past Surgical History   Surgical History[2]      Family History    Family History[3]      Social History   Social History[4]      Allergies   Allergies[5]    Medications   [Held by provider] apixaban, 5 mg, BID  dexAMETHasone, 4 mg, q12h RODRIGO  escitalopram, 10 mg, Daily  [Held by provider] heparin (porcine), 5,000 Units, q8h  insulin glargine, 14 Units, q24h  insulin lispro, 0-15 Units, TID AC  insulin lispro, 5 Units, TID AC  metoprolol tartrate, 50 mg, BID  pantoprazole, 40 mg, Daily before breakfast  polyethylene glycol, 17 g, Daily  rosuvastatin, 40 mg, Daily  sennosides-docusate sodium, 1 tablet, Nightly         acetaminophen, 650  mg, q4h PRN   Or  acetaminophen, 650 mg, q4h PRN   Or  acetaminophen, 650 mg, q4h PRN  dextrose, 12.5 g, q15 min PRN  dextrose, 25 g, q15 min PRN  glucagon, 1 mg, q15 min PRN  glucagon, 1 mg, q15 min PRN  HYDROmorphone, 0.2 mg, q3h PRN  oxyCODONE, 10 mg, q4h PRN  oxyCODONE, 5 mg, q4h PRN  oxygen, , Continuous PRN - O2/gases        Review of Systems   12-point ROS negative, except as specified in the HPI.    Physical Exam   /74 (BP Location: Right arm, Patient Position: Lying)   Pulse 65   Temp 36.5 °C (97.7 °F) (Temporal)   Resp 18   Wt 70.3 kg (155 lb)   SpO2 96%   BMI 28.35 kg/m²   Weight:   Vitals:    07/01/25 1326   Weight: 70.3 kg (155 lb)       BSA: 1.75 meters squared    ECOG 3 d/t her disease    Alert and oriented x4  Clear lung sound w/o rale, no wheezing  RHB with mild systolic murmur  Soft and flat abdomen w/o tenderness  No pitting edema    Preserved cranial nerve function  Motor: RUE 5/5, RLE 5/5, LUE 3/5, LLE 2/5  Sensory: intact    Labs   Reviewed      Imaging   Brain MRI perfusion and fingerprinting (6/25/25)  IMPRESSION:  Redemonstration of encephalomalacia/gliosis in the right  temporoparietal region. Superimposed enhancement of the lesions  within the right temporoparietal region measuring up to 5.8 cm and  medial right parietal lobe measuring up to 1.9 cm are grossly stable  compared to previous MRI. However, degree of associated diffusion  restriction appears more pronounced. Quantitative assessment of  perfusion imaging is nondiagnostic given motion degradation.  Qualitative assessment does not demonstrate any areas of  significantly increased perfusion separate from adjacent cortex and  vasculature.      Structural and advanced imaging findings are discordant. Overall,  given persistence over a month, findings are thought to represent  neoplasm rather than evolving infarct.    C/A/P CT (6/24/25)  IMPRESSION:  1. No evidence of primary neoplasm or metastatic disease within  the  chest, abdomen or pelvis. If there is persistent concern, PET-CT can  be obtained for further evaluation.  2. Stable size and morphology of intramural leiomyoma within the  uterine fundus without abnormal thickening of the endometrial stripe.  3. Cholelithiasis without evidence of acute cholecystitis and with  stable intrahepatic biliary ductal dilatation.    Assessment/Plan     F/74 w PMHx of  A-fib on Eliquis, DVT/PE 2023, AS, HTN, HLD, T2DM, presenting with L sided weakness/numbness and associated ataxia, found to have R temporoparietal/parietal enhancing lesions likely neoplasm, s/p brain Bx 7/1; prelim results showing high-grade glioma. No distant metastasis on C/A/P CT.    # R temporoparietal/parietal enhancing lesions (Prelim: high-grade glioma)    Recommendations:  -If the final pathology is consistent with high-grade glioma, the treatment will be 3 week course of concurrent chemoradiation with oral temozolomide. But, the patient wants to have more time to think about it with her family.  -FU with Rad-Onc team's recommendations  -We'll schedule our Neuro-Oncology outpatient clinic with Dr Song in 10 days    Thank you for this consult, and Oncology is signing-off.  Patient was seen, examined and discussed with Dr. Song.    Juan Blankenship MD, PhD  Hematology/Oncology Fellow [id number] or Epic  7/3/2025         [1]   Past Medical History:  Diagnosis Date    Aortic stenosis     Atrial fib/flutter, transient (Multi)     Difficulty urinating 01/14/2025    DM (diabetes mellitus) (Multi)     History of left knee replacement 01/30/2024    dr Jefferson    HTN (hypertension)     Influenza 04/28/2023    PE (pulmonary thromboembolism) (Multi) 02/13/2023    Personal history of other diseases of the circulatory system     Personal history of cardiac murmur    Personal history of other diseases of the respiratory system 06/05/2018    History of bronchitis    Personal history of other endocrine, nutritional and  metabolic disease 08/22/2016    History of obesity    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    Tibialis posterior tendon rupture, right, sequela 01/29/2025   [2]   Past Surgical History:  Procedure Laterality Date    TOTAL KNEE ARTHROPLASTY  01/30/2024    TOTAL KNEE ARTHROPLASTY Right     6/2024 -DR Silvio Escalante   [3]   Family History  Problem Relation Name Age of Onset    Breast cancer Mother      Skin cancer Father     [4]   Social History  Socioeconomic History    Marital status:    Tobacco Use    Smoking status: Never    Smokeless tobacco: Never   Vaping Use    Vaping status: Never Used   Substance and Sexual Activity    Alcohol use: Never    Drug use: Never    Sexual activity: Defer     Social Drivers of Health     Financial Resource Strain: Low Risk  (5/22/2025)    Overall Financial Resource Strain (CARDIA)     Difficulty of Paying Living Expenses: Not hard at all   Food Insecurity: No Food Insecurity (5/22/2025)    Hunger Vital Sign     Worried About Running Out of Food in the Last Year: Never true     Ran Out of Food in the Last Year: Never true   Transportation Needs: No Transportation Needs (5/22/2025)    PRAPARE - Transportation     Lack of Transportation (Medical): No     Lack of Transportation (Non-Medical): No   Intimate Partner Violence: Not At Risk (5/22/2025)    Humiliation, Afraid, Rape, and Kick questionnaire     Fear of Current or Ex-Partner: No     Emotionally Abused: No     Physically Abused: No     Sexually Abused: No   Housing Stability: Low Risk  (5/22/2025)    Housing Stability Vital Sign     Unable to Pay for Housing in the Last Year: No     Number of Times Moved in the Last Year: 1     Homeless in the Last Year: No   [5] No Known Allergies

## 2025-07-02 NOTE — PROGRESS NOTES
Physical Therapy    Physical Therapy Treatment    Patient Name: Cassidy Osorio  MRN: [id number]  Department: [place] [id number]  Room: [address] [place] [address]  Today's Date: 7/2/2025  Time Calculation  Start Time: 1225  Stop Time: 1252  Time Calculation (min): 27 min         Assessment/Plan   PT Assessment  End of Session Communication: Bedside nurse  Assessment Comment: Pt able to perform multiple transfers and multiple bouts of ambulation this date. Pt remains limited by weakness, balance, and endurance. Pt remains appropriate for Low Intensity Rehab with 24/7 family assist after DC.  End of Session Patient Position: Up in chair, Alarm off, caregiver present     PT Plan  Treatment/Interventions: Transfer training, Gait training, Stair training, Balance training, Endurance training, Therapeutic exercise, Therapeutic activity  PT Plan: Ongoing PT  PT Frequency: 5 times per week  PT Discharge Recommendations: Low intensity level of continued care  PT Recommended Transfer Status: Assist x1, Assistive device  PT - OK to Discharge: Yes    PT Visit Info:  PT Received On: 07/02/25  Response to Previous Treatment: Patient with no complaints from previous session.     General Visit Information:   General  Family/Caregiver Present: Yes  Caregiver Feedback:  and daughters present and engaged during session  Prior to Session Communication: Bedside nurse  Patient Position Received: Bed, 3 rail up, Alarm off, caregiver present  Preferred Learning Style: auditory, verbal, visual  General Comment: Pt pleasant and eager to work with PT    Subjective   Precautions:  Precautions  Hearing/Visual Limitations: WFL with glasses  Medical Precautions: Fall precautions    Objective   Pain:  Pain Assessment  Pain Assessment: 0-10  0-10 (Numeric) Pain Score: 0 - No pain  Cognition:  Cognition  Overall Cognitive Status: Within Functional Limits  Orientation Level: Oriented X4  Insight: Mild  Impulsive: Mildly  Coordination:  Movements are Fluid and Coordinated: No  (Due to weakness)  Postural Control:  Postural Control  Postural Control: Within Functional Limits  Static Sitting Balance  Static Sitting-Balance Support: Feet supported, No upper extremity supported  Static Sitting-Level of Assistance: Close supervision  Static Standing Balance  Static Standing-Balance Support: Bilateral upper extremity supported  Static Standing-Level of Assistance: Maximum assistance (+2)  Extremity/Trunk Assessments:    RLE   RLE : Exceptions to WFL  Strength RLE  R Hip Flexion: 3+/5  R Knee Flexion: 3/5  R Knee Extension: 3/5  R Ankle Dorsiflexion: 4/5  R Ankle Plantar Flexion: 4/5  LLE   LLE : Exceptions to WFL  Strength LLE  L Hip Flexion: 3-/5  L Knee Flexion: 3/5  L Knee Extension: 3/5  L Ankle Dorsiflexion: 4-/5  L Ankle Plantar Flexion: 4-/5  Activity Tolerance:  Activity Tolerance  Endurance: Tolerates 30 min exercise with multiple rests  Early Mobility/Exercise Safety Screen: Proceed with mobilization - No exclusion criteria met  Treatments:  Therapeutic Exercise  Therapeutic Exercise Performed: Yes  Therapeutic Exercise Activity 1: Seated: APs, LAQ, marches, hip addction isometric X10       Bed Mobility  Bed Mobility: Yes  Bed Mobility 1  Bed Mobility 1: Supine to sitting  Level of Assistance 1: Minimum assistance  Bed Mobility Comments 1: HOB elevated    Ambulation/Gait Training  Ambulation/Gait Training Performed: Yes  Ambulation/Gait Training 1  Surface 1: Level tile  Device 1:  (Arm in arm assist)  Assistance 1: Maximum assistance, Maximum verbal cues (+2, VCs for sequencing LEs)  Quality of Gait 1: Forward flexed posture, Decreased step length, Narrow base of support, Knee(s) buckle (Decreased awa)  Comments/Distance (ft) 1: 3ft X2  Ambulation/Gait Training 2  Surface 2: Level tile  Device 2: Rolling walker  Assistance 2: Maximum assistance, Maximum verbal cues (+2, VCs for sequencing LEs)  Quality of Gait 2: Decreased step length, Narrow base of support, Forward flexed  posture, Knee(s) buckle  Comments/Distance (ft) 2: 8ft  Transfers  Transfer: Yes  Transfer 1  Transfer From 1: Bed to, Stand to  Transfer to 1: Stand, Commode-standard  Technique 1: Sit to stand, Stand to sit  Transfer Device 1:  (Arm in arm)  Transfer Level of Assistance 1: Moderate assistance, +2  Transfers 2  Transfer From 2: Commode-standard to, Stand to  Transfer to 2: Stand, Chair with arms  Technique 2: Sit to stand, Stand to sit  Transfer Device 2: Walker  Transfer Level of Assistance 2: Moderate assistance, +2  Transfers 3  Transfer From 3: Chair with arms to, Stand to  Transfer to 3: Stand, Chair with arms  Technique 3: Sit to stand, Stand to sit  Transfer Device 3: Walker  Transfer Level of Assistance 3: Moderate assistance, +2    Stairs  Stairs: No    Outcome Measures:  [facility] Basic Mobility  Turning from your back to your side while in a flat bed without using bedrails: A little  Moving from lying on your back to sitting on the side of a flat bed without using bedrails: A little  Moving to and from bed to chair (including a wheelchair): Total  Standing up from a chair using your arms (e.g. wheelchair or bedside chair): Total  To walk in hospital room: Total  Climbing 3-5 steps with railing: Total  Basic Mobility - Total Score: 10    Education Documentation  Precautions, taught by Dania Hart PT at 7/2/2025  2:29 PM.  Learner: Patient  Readiness: Acceptance  Method: Explanation  Response: Verbalizes Understanding, Demonstrated Understanding  Comment: PT POC, FWW use    Body Mechanics, taught by Dania Hart PT at 7/2/2025  2:29 PM.  Learner: Patient  Readiness: Acceptance  Method: Explanation  Response: Verbalizes Understanding, Demonstrated Understanding  Comment: PT POC, FWW use    Mobility Training, taught by Dania Hart PT at 7/2/2025  2:29 PM.  Learner: Patient  Readiness: Acceptance  Method: Explanation  Response: Verbalizes Understanding, Demonstrated Understanding  Comment: PT POC,  FWW use    Education Comments  No comments found.        Encounter Problems       Encounter Problems (Active)       PT Problem       Patient will maintain Static standing with supervision  (Progressing)       Start:  06/26/25    Expected End:  07/10/25            Patient will perform sit<>stand transfer with LRAD, and Supervision  (Progressing)       Start:  06/26/25    Expected End:  07/10/25            Patient will ambulate >70' with LRAD and Supervision  (Progressing)       Start:  06/26/25    Expected End:  07/10/25            Patient will ascend/descend 5 steps with Bilateral Rails and supervision  (Not Progressing)       Start:  06/26/25    Expected End:  07/10/25               Pain - Adult

## 2025-07-02 NOTE — ANESTHESIA PREPROCEDURE EVALUATION
Patient: Cassidy Osorio    Procedure Information       Date: 07/03/25    Procedure: Right Craniotomy for Tumor Resection    Location: [facility]    Surgeons: Jessica Mccloud MD            Relevant Problems   Cardiac   (+) Aortic stenosis   (+) HLD (hyperlipidemia)   (+) Hypertension   (+) Paroxysmal A-fib (Multi)   (+) Tricuspid valve disease      Endocrine   (+) Diabetes mellitus, type 2 (Multi)       Clinical information reviewed:   Tobacco  Allergies  Meds  Problems  Med Hx  Surg Hx   Fam Hx  Soc   Hx          Summary (from patient interview and chart review):   74 y.o. female h/o HTN, HLD, Afib on Eliquis, mod aortic stenosis (1/2025 TTE EF 65-70%), DVT/PE (2023), DMT2, [facility]/22 x10d of L sided weakness/numbness and ataxia, MR/MRA no vascular abnormality, R temporoparietal enhancement w sig edema, 6/23 p/w workup for intracranial mass, CT CAP neg  6/25 MRI PPF stable R temporoparietal and parietal lobe enhancing lesions likely neoplasm  6/27 BLE DVT US neg     OR Tues 7/1 R brain bx     NPO Detail:  NPO/Void Status  Carbohydrate Drink Given Prior to Surgery? : N  Date of Last Liquid: 07/01/25  Time of Last Liquid: 0000  Date of Last Solid: 07/01/25  Time of Last Solid: 0000  Last Intake Type: Clear fluids         Physical Exam    Airway  Mallampati: III  TM distance: >3 FB  Neck ROM: full  Mouth opening: 3 or more finger widths  Comments: Grade I view on 7/1/25 with MAC 3 blade x 1 attempt with 7.0 ETT     Cardiovascular   Rhythm: regular  Rate: normal     Dental    Pulmonary - normal exam   Abdominal            Anesthesia Plan    History of general anesthesia?: yes  History of complications of general anesthesia?: no    ASA 3     general     intravenous induction   Postoperative pain plan includes opioids.  Trial extubation is planned.  Anesthetic plan and risks discussed with patient.

## 2025-07-03 ENCOUNTER — ANESTHESIA (OUTPATIENT)
Dept: OPERATING ROOM | Facility: HOSPITAL | Age: 75
End: 2025-07-03
Payer: COMMERCIAL

## 2025-07-03 ENCOUNTER — SURGERY (OUTPATIENT)
Age: 75
End: 2025-07-03
Payer: COMMERCIAL

## 2025-07-03 ENCOUNTER — HOME HEALTH ADMISSION (OUTPATIENT)
Dept: HOME HEALTH SERVICES | Facility: HOME HEALTH | Age: 75
End: 2025-07-03
Payer: COMMERCIAL

## 2025-07-03 ENCOUNTER — DOCUMENTATION (OUTPATIENT)
Dept: HOME HEALTH SERVICES | Facility: HOME HEALTH | Age: 75
End: 2025-07-03
Payer: COMMERCIAL

## 2025-07-03 VITALS
HEART RATE: 62 BPM | SYSTOLIC BLOOD PRESSURE: 122 MMHG | OXYGEN SATURATION: 96 % | RESPIRATION RATE: 17 BRPM | WEIGHT: 155 LBS | TEMPERATURE: 98.1 F | DIASTOLIC BLOOD PRESSURE: 75 MMHG | BODY MASS INDEX: 28.35 KG/M2

## 2025-07-03 LAB
ALBUMIN SERPL BCP-MCNC: 2.9 G/DL (ref 3.4–5)
ANION GAP SERPL CALC-SCNC: 13 MMOL/L (ref 10–20)
BUN SERPL-MCNC: 27 MG/DL (ref 6–23)
CALCIUM SERPL-MCNC: 7.9 MG/DL (ref 8.6–10.6)
CHLORIDE SERPL-SCNC: 105 MMOL/L (ref 98–107)
CO2 SERPL-SCNC: 19 MMOL/L (ref 21–32)
CREAT SERPL-MCNC: 0.56 MG/DL (ref 0.5–1.05)
EGFRCR SERPLBLD CKD-EPI 2021: >90 ML/MIN/1.73M*2
ERYTHROCYTE [DISTWIDTH] IN BLOOD BY AUTOMATED COUNT: 14.3 % (ref 11.5–14.5)
GLUCOSE BLD MANUAL STRIP-MCNC: 251 MG/DL (ref 74–99)
GLUCOSE BLD MANUAL STRIP-MCNC: 256 MG/DL (ref 74–99)
GLUCOSE SERPL-MCNC: 294 MG/DL (ref 74–99)
HCT VFR BLD AUTO: 38.3 % (ref 36–46)
HGB BLD-MCNC: 12.5 G/DL (ref 12–16)
MCH RBC QN AUTO: 30 PG (ref 26–34)
MCHC RBC AUTO-ENTMCNC: 32.6 G/DL (ref 32–36)
MCV RBC AUTO: 92 FL (ref 80–100)
NRBC BLD-RTO: 0 /100 WBCS (ref 0–0)
PHOSPHATE SERPL-MCNC: 2.4 MG/DL (ref 2.5–4.9)
PLATELET # BLD AUTO: 114 X10*3/UL (ref 150–450)
POTASSIUM SERPL-SCNC: 3.4 MMOL/L (ref 3.5–5.3)
RBC # BLD AUTO: 4.16 X10*6/UL (ref 4–5.2)
SODIUM SERPL-SCNC: 134 MMOL/L (ref 136–145)
WBC # BLD AUTO: 7.9 X10*3/UL (ref 4.4–11.3)

## 2025-07-03 PROCEDURE — 36415 COLL VENOUS BLD VENIPUNCTURE: CPT

## 2025-07-03 PROCEDURE — 2500000004 HC RX 250 GENERAL PHARMACY W/ HCPCS (ALT 636 FOR OP/ED)

## 2025-07-03 PROCEDURE — 80069 RENAL FUNCTION PANEL: CPT

## 2025-07-03 PROCEDURE — 2500000002 HC RX 250 W HCPCS SELF ADMINISTERED DRUGS (ALT 637 FOR MEDICARE OP, ALT 636 FOR OP/ED)

## 2025-07-03 PROCEDURE — 2500000002 HC RX 250 W HCPCS SELF ADMINISTERED DRUGS (ALT 637 FOR MEDICARE OP, ALT 636 FOR OP/ED): Performed by: NURSE PRACTITIONER

## 2025-07-03 PROCEDURE — 82947 ASSAY GLUCOSE BLOOD QUANT: CPT

## 2025-07-03 PROCEDURE — 99239 HOSP IP/OBS DSCHRG MGMT >30: CPT | Performed by: NURSE PRACTITIONER

## 2025-07-03 PROCEDURE — 99222 1ST HOSP IP/OBS MODERATE 55: CPT | Performed by: PHYSICIAN ASSISTANT

## 2025-07-03 PROCEDURE — 2500000001 HC RX 250 WO HCPCS SELF ADMINISTERED DRUGS (ALT 637 FOR MEDICARE OP)

## 2025-07-03 PROCEDURE — 85027 COMPLETE CBC AUTOMATED: CPT

## 2025-07-03 RX ORDER — PANTOPRAZOLE SODIUM 40 MG/1
40 TABLET, DELAYED RELEASE ORAL
Qty: 30 TABLET | Refills: 1 | Status: SHIPPED | OUTPATIENT
Start: 2025-07-04

## 2025-07-03 RX ORDER — DEXAMETHASONE 2 MG/1
2 TABLET ORAL EVERY 12 HOURS
Qty: 30 TABLET | Refills: 1 | Status: SHIPPED | OUTPATIENT
Start: 2025-07-03

## 2025-07-03 RX ORDER — METFORMIN HYDROCHLORIDE 1000 MG/1
1000 TABLET, EXTENDED RELEASE ORAL
Start: 2025-07-03

## 2025-07-03 RX ORDER — ACETAMINOPHEN 325 MG/1
650 TABLET ORAL EVERY 4 HOURS PRN
Start: 2025-07-03

## 2025-07-03 RX ADMIN — ESCITALOPRAM OXALATE 10 MG: 10 TABLET ORAL at 08:57

## 2025-07-03 RX ADMIN — POLYETHYLENE GLYCOL 3350 17 G: 17 POWDER, FOR SOLUTION ORAL at 08:57

## 2025-07-03 RX ADMIN — INSULIN LISPRO 9 UNITS: 100 INJECTION, SOLUTION INTRAVENOUS; SUBCUTANEOUS at 08:58

## 2025-07-03 RX ADMIN — METOPROLOL TARTRATE 50 MG: 50 TABLET, FILM COATED ORAL at 08:57

## 2025-07-03 RX ADMIN — INSULIN LISPRO 6 UNITS: 100 INJECTION, SOLUTION INTRAVENOUS; SUBCUTANEOUS at 13:17

## 2025-07-03 RX ADMIN — INSULIN LISPRO 5 UNITS: 100 INJECTION, SOLUTION INTRAVENOUS; SUBCUTANEOUS at 08:58

## 2025-07-03 RX ADMIN — DEXAMETHASONE 4 MG: 4 TABLET ORAL at 08:57

## 2025-07-03 RX ADMIN — INSULIN LISPRO 5 UNITS: 100 INJECTION, SOLUTION INTRAVENOUS; SUBCUTANEOUS at 13:16

## 2025-07-03 RX ADMIN — PANTOPRAZOLE SODIUM 40 MG: 20 TABLET, DELAYED RELEASE ORAL at 05:49

## 2025-07-03 RX ADMIN — INSULIN GLARGINE 18 UNITS: 100 INJECTION, SOLUTION SUBCUTANEOUS at 08:58

## 2025-07-03 RX ADMIN — ROSUVASTATIN CALCIUM 40 MG: 20 TABLET, FILM COATED ORAL at 13:16

## 2025-07-03 ASSESSMENT — COGNITIVE AND FUNCTIONAL STATUS - GENERAL
WALKING IN HOSPITAL ROOM: A LOT
TURNING FROM BACK TO SIDE WHILE IN FLAT BAD: A LITTLE
DRESSING REGULAR UPPER BODY CLOTHING: A LITTLE
MOBILITY SCORE: 14
DAILY ACTIVITIY SCORE: 15
PERSONAL GROOMING: A LOT
HELP NEEDED FOR BATHING: A LITTLE
TOILETING: A LOT
CLIMB 3 TO 5 STEPS WITH RAILING: TOTAL
STANDING UP FROM CHAIR USING ARMS: A LOT
EATING MEALS: A LOT
MOVING TO AND FROM BED TO CHAIR: A LOT
DRESSING REGULAR LOWER BODY CLOTHING: A LITTLE

## 2025-07-03 ASSESSMENT — PAIN SCALES - GENERAL: PAINLEVEL_OUTOF10: 0 - NO PAIN

## 2025-07-03 NOTE — HH CARE COORDINATION
Home Care received a Referral for Nursing, Physical Therapy, and Occupational Therapy. We have processed the referral for a Start of Care on 24-48hrs.     If you have any questions or concerns, please feel free to contact us at [phone]. Follow the prompts, enter your five digit zip code, and you will be directed to your care team on E[facility] 2.

## 2025-07-03 NOTE — DOCUMENTATION CLARIFICATION NOTE
PATIENT:               SANA RODRIGUEZ  ACCT #:                  [id number]  MRN:                       [id number]  :                       1950  ADMIT DATE:       2025 6:58 PM  DISCH DATE:        7/3/2025 2:40 PM  RESPONDING PROVIDER #:        [id number]          PROVIDER RESPONSE TEXT:    Low platelets not requiring treatment or evaluation    CDI QUERY TEXT:    Clarification    Instruction:    Based on your assessment of the patient and the clinical information, please provide the requested documentation by clicking on the appropriate radio button and enter any additional information if prompted.    Question: Is there a diagnosis indicative of the lab values    When answering this query, please exercise your independent professional judgment. The fact that a question is being asked, does not imply that any particular answer is desired or expected.    The patient's clinical indicators include:  Clinical Information: 74 y.o. female with glioma.    Clinical Indicators:  Labs:  25 10:25 Platelets: 134 (L)  25 06:42 Platelets: 138 (L)  25 07:56 Platelets: 117 (L)  25 08:28 Platelets: 106 (L)  25 07:33 Platelets: 122 (L)    Treatment:  Monitoring CBC daily    Risk Factors: Age, glioma, Eliquis  Options provided:  -- Thrombocytopenia is clinically significant and required treatment/monitoring  -- Low platelets not requiring treatment or evaluation  -- Other - I will add my own diagnosis  -- Refer to Clinical Documentation Reviewer    Query created by: Aguilar Zapien on 2025 1:49 PM      Electronically signed by:  ARIE AMARAL 7/3/2025 3:51 PM

## 2025-07-03 NOTE — OP NOTE
Right, BIOPSY, BRAIN, USING FRAMELESS STEREOTAXY (R) Operative Note     Date: 2025  OR Location: [facility] OR    Name: Cassidy Osorio YOB: 1950, Age: 74 y.o., MRN: [id number], Sex: female    Diagnosis  Pre-op Diagnosis      * Brain lesion [G93.9] Post-op Diagnosis     * Brain lesion [G93.9]     Procedures  Right stevie  hole for open stereotactic biopsy of brain lesion  Use of Stereotactic neuronavigation for cranial intradural procedure    Surgeons      * Jesscia Mccloud - Primary    Resident/Fellow/Other Assistant:  Surgeons and Role:     * Kelly Petty MD - Resident - Assisting     * Yadiel Guardado MD - Resident - Assisting    Staff:   Circulator: Rosita Ageeub Person: Nathaly    Anesthesia Staff: Anesthesiologist: James Howe MD PhD  Anesthesia Resident: Osiris Mock DO; Eliza Dennis MD; Kezia Kwong MD    Procedure Summary  Anesthesia: General  ASA: IV  Estimated Blood Loss: 3 mL  Intra-op Medications:   Administrations occurring from  to  on 25:   Medication Name Total Dose   dexAMETHasone (Decadron) tablet 4 mg Cannot be calculated   dextrose 50 % injection 12.5 g Cannot be calculated   dextrose 50 % injection 25 g Cannot be calculated   escitalopram (Lexapro) tablet 10 mg Cannot be calculated   glucagon (Glucagen) injection 1 mg Cannot be calculated   glucagon (Glucagen) injection 1 mg Cannot be calculated   insulin lispro injection 0-15 Units Cannot be calculated   insulin lispro injection 5 Units Cannot be calculated   metoprolol tartrate (Lopressor) tablet 50 mg Cannot be calculated   pantoprazole (ProtoNix) EC tablet 40 mg Cannot be calculated   polyethylene glycol (Glycolax, Miralax) packet 17 g Cannot be calculated   rosuvastatin (Crestor) tablet 40 mg Cannot be calculated   sennosides-docusate sodium (Ana Laura-Colace) 8.6-50 mg per tablet 1 tablet Cannot be calculated   fentaNYL (Sublimaze) injection 50 mcg/mL 25 mcg   insulin glargine (Lantus) injection 14 Units  Cannot be calculated   ondansetron (Zofran) 2 mg/mL injection 4 mg   NaCl 0.9 % bolus Cannot be calculated   sodium chloride 0.9% infusion Cannot be calculated   sugammadex (Bridion) 200 mg/2 mL injection 200 mg              Anesthesia Record               Intraprocedure I/O Totals          Intake    NaCl 0.9 % bolus 200.00 mL    sodium chloride 0.9% infusion 544.17 mL    Total Intake 744.17 mL       Output    Urine 0 mL    Est. Blood Loss 3 mL    Total Output 3 mL       Net    Net Volume 741.17 mL          Specimen:   ID Type Source Tests Collected by Time   1 : RIGHT BRAIN MASS Tissue BRAIN BIOPSY SURGICAL PATHOLOGY EXAM Jessica Mccloud MD 7/1/2025 1720                 Drains and/or Catheters:   [REMOVED] External Urinary Catheter Female (Removed)   External Catheter Status Changed 06/24/25 0600   Securement Method Adhesive device 06/24/25 0055   Output (mL) 0 mL 06/24/25 0505       Tourniquet Times:         Implants:     Findings: preliminary pathology suspicious for glioma    Indications: Cassidy Osorio is an 74 y.o. female who is having surgery for Brain lesion [G93.9].     The patient was seen in the preoperative area. The risks, benefits, complications, treatment options, non-operative alternatives, expected recovery and outcomes were discussed with the patient. The possibilities of reaction to medication, pulmonary aspiration, injury to surrounding structures, bleeding, recurrent infection, the need for additional procedures, failure to diagnose a condition, and creating a complication requiring transfusion or operation were discussed with the patient. The patient concurred with the proposed plan, giving informed consent.  The site of surgery was properly noted/marked if necessary per policy. The patient has been actively warmed in preoperative area. Preoperative antibiotics have been ordered and given within 1 hours of incision. Venous thrombosis prophylaxis have been ordered including bilateral sequential  compression devices    Procedure Details: After informed consent was obtained, the patient was taken to the operating room and placed supine on the operating table.  A time-out was carried out and the patient's identity, including medical record number, laterality of the operation, and procedure to be performed were confirmed by the surgical team, anesthesia team, and nursing staff.  The patient was then placed under general endotracheal anesthesia and both lower extremities were placed in sequential compression devices.  IVs and EKG leads were placed as well.  The patient's head was then fixed using the Arshad 3 Kees head fixation device. The trajectory planning was then carried out, and an entry point and a target site was identified using the stealth navigation system.  Once the registration was obtained and the entry point was identified on the surface of the scalp, we then fashioned an S shaped incision directly over the entry site. The patient was prepped and draped in the usual sterile manner.      The skin was then infiltrated with local anesthesia containing lidocaine with 1:200,000 epinephrine and the incision was made down to the bone.  We then used a  drill bit to create a quarter-sized  stevie hole directly over the target lesion. We then used a curette and 2 mm Kerrison to expose the dura. We bipolared the dural surface. Then we used an 11 blade to create a cruciate incision of the dura. We then bipolared the dural edges. We could see a soft mass on the brain surface.  We then used an 11 blade to cut the pial surface. We then used a biopsy forceps to send several specimens to pathology for frozen and permanent staining of the brain lesion. Preliminary pathology was consistent with glioma. The tissue looked grossly abnormal. Once the tissue samples were sent and pathology was confirmed to be adequate for diagnosis, our attention was turned to closure. Hemostasis was achieved, and the wound was  irrigated with antibiotic irrigation.     A small piece of Duragen was placed over the durotomy. A cranial plate stevie hole cover was placed over the stevie hole and secured with microscrews. The galeal layer was closed with 2-0 Vicryl suture, and the skin was closed with  running 3-0 Monocryl absorbable suture. The incision was covered with bacitracin ointment and a Telfa island dressing. The patient was taken out of pins and remained stable.  The patient was extubated and transferred to recovery room in stable condition.  The sponge, instrument, and needle counts were correct at the end of the operation.    Dr. Jessica Mccloud was present during the entire procedure.   Evidence of Infection: No   Complications:  None; patient tolerated the procedure well.    Disposition: PACU - hemodynamically stable.  Condition: stable       Additional Details:     Attending Attestation: I was present and scrubbed for the key portions of the procedure.    Jessica Mccloud  Phone Number: [phone]

## 2025-07-03 NOTE — PROGRESS NOTES
T[facility] sent referral over to [facility], [facility] for PT/OT.     1509- Patient accepted, SOC 24-48 hours      PRAMOD Wagner, RN  [facility], [facility] 5&9  Transitional Care Coordinator, Mon-Fri  Cell: [phone], Office: [phone]  Email: [email or web address]

## 2025-07-03 NOTE — CARE PLAN
The patient's goals for the shift include  rest    The clinical goals for the shift include Patient will remain safe and free from injury overnight.    Over the shift, the patient had a stable exam and was safe and free from injury. Patient denied any pain or discomfort overnight. Anticipate discharge later today.  Problem: Pain - Adult  Goal: Verbalizes/displays adequate comfort level or baseline comfort level  Outcome: Progressing     Problem: Safety - Adult  Goal: Free from fall injury  Outcome: Progressing     Problem: Discharge Planning  Goal: Discharge to home or other facility with appropriate resources  Outcome: Progressing     Problem: Chronic Conditions and Co-morbidities  Goal: Patient's chronic conditions and co-morbidity symptoms are monitored and maintained or improved  Outcome: Progressing     Problem: Nutrition  Goal: Nutrient intake appropriate for maintaining nutritional needs  Outcome: Progressing

## 2025-07-03 NOTE — CONSULTS
Radiation Oncology Inpatient Consult    Patient Name:  Cassidy Osorio  MRN:  [id number]  :  1950    Referring Provider: Jessica Holley AP*  Primary Care Provider: Jeanne Colbert MD  Care Team: Patient Care Team:  Jeanne Colbert MD as PCP - General (Family Medicine)    Date of Service: 7/3/2025    SUBJECTIVE  History of Present Illness:  This is a 74-year-old [employer] female with a history of HTN, HLD, Afib on Eliquis, aortic stenosis (2025 TTE EF 65-70%), DVT/PE (), DMT2, presented to [facility] ED on  with left sided weakness/numbness and ataxia.  Imaging studies obtained in workup identified right parietal and temporal lobe enhancement, concerning for neoplasm.  The patient was seen by neurosurgical services, and reviewed at multidisciplinary tumor board.  She was recommended for stereotactic biopsy, which was performed on  without complication.  Preliminary pathology supports high-grade glioma.  Radiation oncology has been asked to evaluate for potential care coordination.    Patient was able to be evaluated with her  and friends at bedside.  Collectively, they endorse a 1 month history of progressive left-sided weakness.  The patient has become increasingly more fatigued.  She is unable to ambulate due to the extent of weakness in her lower extremity.    Prior Radiotherapy:  no    Current Systemic Treatment:  no     Presence of Pacemaker or ICD:  no    Past Medical History:  Medical History[1]     Past Surgical History:  Surgical History[2]     Family History:  Cancer-related family history includes Breast cancer in her mother; Skin cancer in her father.    Social History:  Social History[3]    Allergies:  Allergies[4]     Medications:  Current Medications[5]      Review of Systems:    Denies headaches, no chest pain, no problems breathing, no abdominal complaints, no GI/ changes, no abnormal bleeding    Performance Status:  The Karnofsky performance scale today is  40, Disabled; requires special care and assistance (ECOG equivalent 3).        OBJECTIVE  Physical Exam:  /87 (BP Location: Left arm, Patient Position: Lying)   Pulse 60   Temp 35.4 °C (95.7 °F) (Temporal)   Resp 17   Wt 70.3 kg (155 lb)   SpO2 93%   BMI 28.35 kg/m²    General: awake, alert, resting comfortably, well developed and well nourished in appearance  HEENT: pupils equal and round, no scleral icterus  Pulmonary: Breathing comfortably at rest   Cardiac: regular rate  Abdomen: soft, nondistended, non tender to palpation   Neuro: A&O x 3, cranial nerves II through XII grossly intact, decreased strength on the left side, 4/5, with pronator drift  Psych: Normal affect     Laboratory Review:      7/03/25 1016  Renal Function Panel  Collected: 07/03/25 0854  Final result  Specimen: Blood, Venous    Glucose 294 High  mg/dL Urea Nitrogen 27 High  mg/dL   Sodium 134 Low  mmol/L Creatinine 0.56 mg/dL   Potassium 3.4 Low  mmol/L eGFR >90 mL/min/1.73m*2    Chloride 105 mmol/L Calcium 7.9 Low  mg/dL   Bicarbonate 19 Low  mmol/L Phosphorus 2.4 Low  mg/dL   Anion Gap 13 mmol/L Albumin 2.9 Low  g/dL          07/03/25 0950  CBC  Collected: 07/03/25 0854  Final result  Specimen: Blood, Venous    WBC 7.9 x10*3/uL MCV 92 fL   nRBC 0.0 /100 WBCs MCH 30.0 pg   RBC 4.16 x10*6/uL MCHC 32.6 g/dL   Hemoglobin 12.5 g/dL RDW 14.3 %   Hematocrit 38.3 % Platelets 114 Low  x10*3/uL          Pathology Review:      Brain biopsy 7/1, prelim glioma      Imaging:      MRI brain w/ perfusion 6/25            IMPRESSION:  Redemonstration of encephalomalacia/gliosis in the right  temporoparietal region. Superimposed enhancement of the lesions  within the right temporoparietal region measuring up to 5.8 cm and  medial right parietal lobe measuring up to 1.9 cm are grossly stable  compared to previous MRI. However, degree of associated diffusion  restriction appears more pronounced. Quantitative assessment of  perfusion imaging is  nondiagnostic given motion degradation.  Qualitative assessment does not demonstrate any areas of  significantly increased perfusion separate from adjacent cortex and  vasculature.      Structural and advanced imaging findings are discordant. Overall,  given persistence over a month, findings are thought to represent  neoplasm rather than evolving infarct.          ASSESSMENT:  This is a 74-year-old [place] female with a history of HTN, HLD, Afib on Eliquis, aortic stenosis (1/2025 TTE EF 65-70%), DVT/PE (2023), DMT2, presented to [facility] ED on 5/22 with left sided weakness/numbness and ataxia.  Imaging studies obtained in workup identified right parietal and temporal lobe enhancement, concerning for neoplasm.  The patient was seen by neurosurgical services, and reviewed at multidisciplinary tumor board.  She was recommended for stereotactic biopsy, which was performed on 7/1 without complication.  Preliminary pathology supports high-grade glioma.  Radiation oncology has been asked to evaluate for potential care coordination.    PLAN:    The patient was discussed with my attending physician, Dr. Nathan.    Working diagnosis of high-grade glioma, and the indications for radiotherapy were reviewed with the patient, her , and friends.  We reviewed a potential treatment course spanning over 3 weeks, with concomitant temozolomide.  We discussed the associated risk, benefits, and logistics.  All questions were answered to their satisfaction. The patient has indicated that she has declined surgical resection.  Their preference is to await final pathology before committing to a treatment course.  We discussed tentative plans to follow-up at [facility] for review.      -The patient and her family have been briefed on chemoradiotherapy for HGG.  They have requested additional time to review final pathology prior to committing to a treatment course.  - Will make arrangements for follow-up at [facility] with Dr. Nathan    I  spent 50 minutes in the professional and overall care of this patient.              [1]   Past Medical History:  Diagnosis Date    Aortic stenosis     Atrial fib/flutter, transient (Multi)     Difficulty urinating 01/14/2025    DM (diabetes mellitus) (Multi)     History of left knee replacement 01/30/2024    dr Escalante    HTN (hypertension)     Influenza 04/28/2023    PE (pulmonary thromboembolism) (Multi) 02/13/2023    Personal history of other diseases of the circulatory system     Personal history of cardiac murmur    Personal history of other diseases of the respiratory system 06/05/2018    History of bronchitis    Personal history of other endocrine, nutritional and metabolic disease 08/22/2016    History of obesity    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    Tibialis posterior tendon rupture, right, sequela 01/29/2025   [2]   Past Surgical History:  Procedure Laterality Date    TOTAL KNEE ARTHROPLASTY  01/30/2024    TOTAL KNEE ARTHROPLASTY Right     6/2024 -DR Silvio Escalante   [3]   Social History  Tobacco Use    Smoking status: Never    Smokeless tobacco: Never   Vaping Use    Vaping status: Never Used   Substance Use Topics    Alcohol use: Never    Drug use: Never   [4] No Known Allergies  [5]   Current Facility-Administered Medications:     acetaminophen (Tylenol) tablet 650 mg, 650 mg, oral, q4h PRN **OR** acetaminophen (Tylenol) oral liquid 650 mg, 650 mg, nasogastric tube, q4h PRN **OR** acetaminophen (Tylenol) suppository 650 mg, 650 mg, rectal, q4h PRN, Yadiel Guardado MD    [Held by provider] apixaban (Eliquis) tablet 5 mg, 5 mg, oral, BID, Yadiel Guardado MD    dexAMETHasone (Decadron) tablet 4 mg, 4 mg, oral, q12h RODRIGO, Yadiel Guardado MD, 4 mg at 07/03/25 0857    dextrose 50 % injection 12.5 g, 12.5 g, intravenous, q15 min PRN, Yadiel Guardado MD    dextrose 50 % injection 25 g, 25 g, intravenous, q15 min PRN, Yadiel Guardado MD    escitalopram (Lexapro) tablet 10 mg, 10 mg, oral, Daily, Yadiel  MD Geo, 10 mg at 07/03/25 0857    glucagon (Glucagen) injection 1 mg, 1 mg, intramuscular, q15 min PRN, Yadiel Guardado MD    glucagon (Glucagen) injection 1 mg, 1 mg, intramuscular, q15 min PRN, Yadiel Guardado MD    [Held by provider] heparin (porcine) injection 5,000 Units, 5,000 Units, subcutaneous, q8h, Yadiel Guardado MD, 5,000 Units at 06/30/25 2123    HYDROmorphone (Dilaudid) injection 0.2 mg, 0.2 mg, intravenous, q3h PRN, Yadiel Guardado MD    insulin glargine (Lantus) injection 18 Units, 18 Units, subcutaneous, q24h, Aisha Duran, APRN-CNP, 18 Units at 07/03/25 0858    insulin lispro injection 0-15 Units, 0-15 Units, subcutaneous, TID AC, Yadiel Guardado MD, 9 Units at 07/03/25 0858    insulin lispro injection 5 Units, 5 Units, subcutaneous, TID AC, Yadiel Guardado MD, 5 Units at 07/03/25 0858    metoprolol tartrate (Lopressor) tablet 50 mg, 50 mg, oral, BID, Yadiel Guardado MD, 50 mg at 07/03/25 0857    oxyCODONE (Roxicodone) immediate release tablet 10 mg, 10 mg, oral, q4h PRN, Yadiel Guardado MD    oxyCODONE (Roxicodone) immediate release tablet 5 mg, 5 mg, oral, q4h PRN, Yadiel Guardado MD    oxygen (O2) therapy, , inhalation, Continuous PRN - O2/gases, Yadiel Guardado MD    pantoprazole (ProtoNix) EC tablet 40 mg, 40 mg, oral, Daily before breakfast, Yadiel Guardado MD, 40 mg at 07/03/25 0549    polyethylene glycol (Glycolax, Miralax) packet 17 g, 17 g, oral, Daily, Yadiel Guardado MD, 17 g at 07/03/25 0857    rosuvastatin (Crestor) tablet 40 mg, 40 mg, oral, Daily, Yadiel Guardado MD, 40 mg at 07/02/25 1248    sennosides-docusate sodium (Ana Laura-Colace) 8.6-50 mg per tablet 1 tablet, 1 tablet, oral, Nightly, Yadiel Guardado MD, 1 tablet at 07/02/25 2003

## 2025-07-03 NOTE — DISCHARGE SUMMARY
Discharge Diagnosis  Brain lesion    Issues Requiring Follow-Up  Neurosurgery, Radiation and Oncology follow up requested    Test Results Pending At Discharge  Pending Labs       Order Current Status    Surgical Pathology Exam In process            Vitals  Vitals:    07/03/25 1100   BP: 122/75   Pulse: 62   Resp: 17   Temp: 36.7 °C (98.1 °F)   SpO2: 96%       Hospital Course  Cassidy Osorio is a 74 year old female with a past medical history of HTN, HLD, Afib on Eliquis, aortic stenosis (1/2025 TTE EF 65-70%), DVT/PE (2023) and DMT2 who presented to the [facility] ED on 5/22 with x10 days of L sided weakness/numbness and ataxia. MR/MRA with no vascular abnormality, R temporoparietal enhancement with sig edema. Patient admitted to the neurosurgery service 6/23 for further workup of noted intracranial mass.      6/24 CT CAP negative for neoplasm or metastatic disease. MRI PPF complete.   6/25 Perioperative medicine consulted for preoperative evaluation and recommended insulin regimen adjusted due to poor control.    6/27 BLE DVT US negative  6/30 vCTH stable  7/1 s/p open right parietal brain biopsy, CTH postop changes  7/3 Med Onc and Rad Onc consulted for outpatient follow up      PT/OT evaluated patient and recommended low intensity; request Home Care    On the day of discharge, the patient was seen and evaluated by the neurosurgery team and deemed suitable for discharge. The patient was given detailed discharge instructions and were scheduled to follow up as an outpatient.            Pertinent Physical Exam At Time of Discharge  Physical Exam  General: in bed, awake, no distress  HEENT: right scalp incision clean, dry, intact; SLICK; EOMI; tongue midline  Neuro: Alert and oriented x3; follows commands; FIGUEROA's; RUE/RLE 5/5, LUE 4/5; LLE prox 3/4, distal 4/5; left pronator drift  Cardiac: regular rate and rhythm  Resp: respirations easy and regular  Abd: BS +x4, soft, non-tender, non-distended  Extr: no edema; pulses  palpable x4  Skin: warm, dry, intact.    Psych: appropriate and cooperative   Discharge Medications     Medication List      START taking these medications     acetaminophen 325 mg tablet; Commonly known as: Tylenol; Take 2 tablets   (650 mg) by mouth every 4 hours if needed for mild pain (1 - 3).   pantoprazole 40 mg EC tablet; Commonly known as: ProtoNix; Take 1 tablet   (40 mg) by mouth once daily in the morning. Take before meals. Do not   crush, chew, or split.  Take while taking steroid Dexamathasone; Start   taking on: July 4, 2025     CHANGE how you take these medications     apixaban 5 mg tablet; Commonly known as: Eliquis; Take 1 tablet (5 mg)   by mouth 2 times a day. DO NOT RESUME TAKING UNTIL INSTRUCTED TO AT   NEUROSURGERY FOLLOW UP; What changed: additional instructions   dexAMETHasone 2 mg tablet; Commonly known as: Decadron; Take 1 tablet (2   mg) by mouth every 12 hours. CONTINUE UNTIL INSTRUCTED TO STOP AT   NEUROSURGERY FOLLOW UP; What changed: medication strength, how much to   take, when to take this, additional instructions     CONTINUE taking these medications     escitalopram 10 mg tablet; Commonly known as: Lexapro; Take 1 tablet (10   mg) by mouth once daily.   metFORMIN (MOD) 1,000 mg 24 hr tablet; Commonly known as: Glumetza; Take   1 tablet (1,000 mg) by mouth 2 times daily (morning and late afternoon).   Do not crush, chew, or split.   metoprolol tartrate 50 mg tablet; Commonly known as: Lopressor; Take 1   tablet by mouth 2 times a day. Take half of a tablet two times per day   rosuvastatin 40 mg tablet; Commonly known as: Crestor; Take 1 tablet (40   mg) by mouth once daily.     I spent a total of 35 minutes with the patient and family and greater than 50% was spent in counseling and coordination of care.   Outpatient Follow-Up  Future Appointments   Date Time Provider Department Center   7/15/2025  1:00 PM Jason Reyes MD [id number]       Aisha Duran,  APRN-CNP

## 2025-07-07 ENCOUNTER — TELEPHONE (OUTPATIENT)
Dept: HOME HEALTH SERVICES | Facility: HOME HEALTH | Age: 75
End: 2025-07-07
Payer: COMMERCIAL

## 2025-07-07 DIAGNOSIS — C71.9 GLIOMA (MULTI): Primary | ICD-10-CM

## 2025-07-07 NOTE — TELEPHONE ENCOUNTER
FYI patient was referred to [facility] for nursing and therapy upon hospital discharge however family declined services when contacted for scheduling. Please ensure patient has a hospital follow up scheduled as they will not be monitored by home care.

## 2025-07-09 ENCOUNTER — TUMOR BOARD CONFERENCE (OUTPATIENT)
Dept: HEMATOLOGY/ONCOLOGY | Facility: HOSPITAL | Age: 75
End: 2025-07-09
Payer: COMMERCIAL

## 2025-07-09 ENCOUNTER — TELEPHONE (OUTPATIENT)
Dept: RADIATION ONCOLOGY | Facility: HOSPITAL | Age: 75
End: 2025-07-09

## 2025-07-09 LAB
LAB AP ASR DISCLAIMER: NORMAL
LABORATORY COMMENT REPORT: NORMAL
PATH REPORT.FINAL DX SPEC: NORMAL
PATH REPORT.GROSS SPEC: NORMAL
PATH REPORT.RELEVANT HX SPEC: NORMAL
PATH REPORT.TOTAL CANCER: NORMAL

## 2025-07-09 NOTE — TELEPHONE ENCOUNTER
Spoke with pt's  who is unsure if pt will be able to make appt. Says he will call back to reschedule if needed to. Appt is tmrw 7/10/25 at 3PM.

## 2025-07-09 NOTE — TUMOR BOARD NOTE
CNS Tumor Board Recommendations       Patient was presented by Dr. Jessica Mccloud at our CNS Tumor Board on 07/09/2025 which included representatives from Radiation oncology, Surgical oncology, Neuro-oncology, Pathology, Radiology, Research, Neurosurgery, Social Work (Neurosurgery).     Current patient presents with history of the following treatment history: PMH significant for Afib on Eliquis, HTN, HLD, aortic stenosis (TTE 01/2025 EF 65-70%), DVT/PE (2023), DMT2, who presented for evaluation to [facility] ED on 05/22 with 10 days complaint of left sided weakness/ numbness and ataxia. MR/ MRA without vascular abnormality or aneurysm but was noted to have right parietal and temporal lobe enhancement with significant vasogenic edema. CT C/A/P negative for neoplasm or metastatic disease. Now s/p open right parietal sarah biopsy 07/01/25.     MRI 05/23/25 and 06/25/25 with areas of nodularity and signal abnormality enhancement with significant edema. Findings consistent with neoplasm. MRI PPF degraded by motion.     Pathology: Large B cell lymphoma (primary)     The CNS Tumor Board tumor board considered available treatment options and made the following recommendations: Radiation oncology for treatment. Lymphoma oncology referral.     Clinical Trial Status: N/A     National site-specific guidelines were discussed with respect to the case.

## 2025-07-15 ENCOUNTER — TELEPHONE (OUTPATIENT)
Dept: HEMATOLOGY/ONCOLOGY | Facility: HOSPITAL | Age: 75
End: 2025-07-15
Payer: COMMERCIAL

## 2025-07-15 ENCOUNTER — TELEPHONE (OUTPATIENT)
Dept: PRIMARY CARE | Facility: CLINIC | Age: 75
End: 2025-07-15
Payer: COMMERCIAL

## 2025-07-15 DIAGNOSIS — C85.91 LYMPHOMA OF LYMPH NODES OF HEAD, UNSPECIFIED LYMPHOMA TYPE (MULTI): Primary | ICD-10-CM

## 2025-07-15 DIAGNOSIS — R27.0 ATAXIA: ICD-10-CM

## 2025-07-15 DIAGNOSIS — I63.9 CEREBROVASCULAR ACCIDENT (CVA), UNSPECIFIED MECHANISM (MULTI): ICD-10-CM

## 2025-07-15 NOTE — TELEPHONE ENCOUNTER
Called [facility] to see if Cassidy was able to come her her appt today. Sent straight to identified voicemail. Left detailed message to see if Cassidy was coming to her 1pm NPV with Dr. Reyes. I asked that they give us a call back to let us know or to get rescheduled.    Lolis KNIGHT RN

## 2025-07-15 NOTE — TELEPHONE ENCOUNTER
PT was in Hospital and they stopped her Eliquis and she does not know when she should start it up again?  Also  needs an rx for Eliquis to the [facility] will call back with pharmacy phone number

## 2025-07-15 NOTE — TELEPHONE ENCOUNTER
Looking at the notes from the hospital - she was supposed to find out at her appt with neurosurg yesterday.  IF did not - should call them to find out.

## 2025-07-18 RX ORDER — ESCITALOPRAM OXALATE 10 MG/1
10 TABLET ORAL DAILY
Qty: 30 TABLET | Refills: 5 | Status: SHIPPED | OUTPATIENT
Start: 2025-07-18 | End: 2025-08-17

## 2025-07-18 NOTE — TELEPHONE ENCOUNTER
Surgeon's office will be calling back after speaking with Dr. Mccloud. There was nothing in her notes regarding when she could start back on Eliquis.

## 2025-07-18 NOTE — DOCUMENTATION CLARIFICATION NOTE
"    PATIENT:               SANA RODRIGUEZ  ACCT #:                  [id number]  MRN:                       [id number]  :                       1950  ADMIT DATE:       2025 6:58 PM  DISCH DATE:        7/3/2025 2:40 PM  RESPONDING PROVIDER #:        [id number]          PROVIDER RESPONSE TEXT:    I concur with the  pathology report findings, and they are clinically significant    CDI QUERY TEXT:    Clarification    Instruction:    Based on your assessment of the patient and the clinical information, please provide the requested documentation by clicking on the appropriate radio button and enter any additional information if prompted.    Question: Please document whether you concur or do not concur with the pathology report findings    When answering this query, please exercise your independent professional judgment. The fact that a question is being asked, does not imply that any particular answer is desired or expected.    The patient's clinical indicators include:  Clinical Information:  74 year old female with a brain mass.    Clinical Indicators and Pathology Findings:  Pathology report :  \"FINAL DIAGNOSIS  A. 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 Neoplastic cells consist of large atypical cells with large round, oval, irregular nuclei and distinct nucleoli. Mitotic activity is brisk. Tumor cells are intermingled with reactive inflammatory infiltrates consisting of mature, small T and B lymphocytes immunoreactive with CD3 and CD5 stains.  The tumor cells are immunohistochemically positive for CD19, CD20, BCL6 and MUM1.There is prominent reactive gliosis.\"  Options provided:  -- I concur with the  pathology report findings, and they are " clinically significant  -- I concur with 07/01 pathology report finding of CNS-diffuse large cell-B cell lymphoma.  -- Other - I will add my own diagnosis  -- Refer to Clinical Documentation Reviewer    Query created by: Aguilar Zapien on 7/17/2025 8:16 AM      Electronically signed by:  ROBERTO MARTINEZ MD 7/18/2025 11:02 AM

## 2025-07-21 ENCOUNTER — TELEPHONE (OUTPATIENT)
Dept: PRIMARY CARE | Facility: CLINIC | Age: 75
End: 2025-07-21
Payer: COMMERCIAL

## 2025-07-21 DIAGNOSIS — I48.0 PAROXYSMAL ATRIAL FIBRILLATION (MULTI): ICD-10-CM

## 2025-07-21 NOTE — TELEPHONE ENCOUNTER
I called and spoke to daughter.  She had been doing rather well and even went to [facility] yesterday.  But this morning she is practically unresponsive.  They know she does not want to be kept alive for further treatment.  She is lying on the couch currently.  She looks comfortable.  They would like hospice out ASAP.    I called and spoke to intake at hospice [facility], diagnosis of malignant neoplasm of the brain.  Looks like it is lymphoma.    They will send somebody out ASAP.    I messaged staff and asked them to send last notes as well as demographic information and referral.

## (undated) DEVICE — MARKER, SKIN, RULER AND LABEL PACK, CUSTOM

## (undated) DEVICE — REST, HEAD, BAGEL, 9 IN

## (undated) DEVICE — COVER, TABLE, UHC

## (undated) DEVICE — SYRINGE, HYPODERMIC, LUER LOCK, 6 CC

## (undated) DEVICE — SEALANT, HEMOSTATIC, FLOSEAL, 10 ML

## (undated) DEVICE — CONTAINER, SPECIMEN, 120 ML, STERILE

## (undated) DEVICE — Device

## (undated) DEVICE — MANIFOLD, 4 PORT NEPTUNE STANDARD

## (undated) DEVICE — BAG, PLASTIC, 10 X 17 IN

## (undated) DEVICE — DRAPE, CRANIOTOMY

## (undated) DEVICE — COVER, CART, 45 X 27 X 48 IN, CLEAR

## (undated) DEVICE — PAD, GROUNDING, ELECTROSURGICAL, W/9 FT CABLE, POLYHESIVE II, ADULT, LF

## (undated) DEVICE — SPHERE, STEALTHSTATION, 5-PK

## (undated) DEVICE — PIN, SKULL, MAYFIELD, ADULT

## (undated) DEVICE — DRESSING, TELFA, 3X4